# Patient Record
Sex: FEMALE | Race: WHITE | NOT HISPANIC OR LATINO | URBAN - METROPOLITAN AREA
[De-identification: names, ages, dates, MRNs, and addresses within clinical notes are randomized per-mention and may not be internally consistent; named-entity substitution may affect disease eponyms.]

---

## 2022-11-23 ENCOUNTER — INPATIENT (INPATIENT)
Facility: HOSPITAL | Age: 82
LOS: 12 days | Discharge: NURSING, REHAB OR LONG-TERM CARE | DRG: 216 | End: 2022-12-06
Attending: THORACIC SURGERY (CARDIOTHORACIC VASCULAR SURGERY) | Admitting: INTERNAL MEDICINE
Payer: MEDICARE

## 2022-11-23 VITALS — WEIGHT: 144.18 LBS | HEIGHT: 63 IN

## 2022-11-23 DIAGNOSIS — Z90.12 ACQUIRED ABSENCE OF LEFT BREAST AND NIPPLE: Chronic | ICD-10-CM

## 2022-11-23 DIAGNOSIS — I10 ESSENTIAL (PRIMARY) HYPERTENSION: ICD-10-CM

## 2022-11-23 DIAGNOSIS — E78.5 HYPERLIPIDEMIA, UNSPECIFIED: ICD-10-CM

## 2022-11-23 DIAGNOSIS — R07.9 CHEST PAIN, UNSPECIFIED: ICD-10-CM

## 2022-11-23 DIAGNOSIS — I25.10 ATHEROSCLEROTIC HEART DISEASE OF NATIVE CORONARY ARTERY WITHOUT ANGINA PECTORIS: ICD-10-CM

## 2022-11-23 DIAGNOSIS — I34.0 NONRHEUMATIC MITRAL (VALVE) INSUFFICIENCY: ICD-10-CM

## 2022-11-23 LAB
ALBUMIN SERPL ELPH-MCNC: 3.6 G/DL — SIGNIFICANT CHANGE UP (ref 3.3–5.2)
ALP SERPL-CCNC: 84 U/L — SIGNIFICANT CHANGE UP (ref 40–120)
ALT FLD-CCNC: 17 U/L — SIGNIFICANT CHANGE UP
ANION GAP SERPL CALC-SCNC: 13 MMOL/L — SIGNIFICANT CHANGE UP (ref 5–17)
APTT BLD: >200 SEC — CRITICAL HIGH (ref 27.5–35.5)
AST SERPL-CCNC: 84 U/L — HIGH
BASOPHILS # BLD AUTO: 0.03 K/UL — SIGNIFICANT CHANGE UP (ref 0–0.2)
BASOPHILS NFR BLD AUTO: 0.2 % — SIGNIFICANT CHANGE UP (ref 0–2)
BILIRUB SERPL-MCNC: 0.5 MG/DL — SIGNIFICANT CHANGE UP (ref 0.4–2)
BUN SERPL-MCNC: 23.6 MG/DL — HIGH (ref 8–20)
CALCIUM SERPL-MCNC: 9.4 MG/DL — SIGNIFICANT CHANGE UP (ref 8.4–10.5)
CHLORIDE SERPL-SCNC: 100 MMOL/L — SIGNIFICANT CHANGE UP (ref 96–108)
CK SERPL-CCNC: 783 U/L — HIGH (ref 25–170)
CO2 SERPL-SCNC: 24 MMOL/L — SIGNIFICANT CHANGE UP (ref 22–29)
CREAT SERPL-MCNC: 1.05 MG/DL — SIGNIFICANT CHANGE UP (ref 0.5–1.3)
EGFR: 53 ML/MIN/1.73M2 — LOW
EOSINOPHIL # BLD AUTO: 0 K/UL — SIGNIFICANT CHANGE UP (ref 0–0.5)
EOSINOPHIL NFR BLD AUTO: 0 % — SIGNIFICANT CHANGE UP (ref 0–6)
GLUCOSE SERPL-MCNC: 143 MG/DL — HIGH (ref 70–99)
HCT VFR BLD CALC: 37.9 % — SIGNIFICANT CHANGE UP (ref 34.5–45)
HGB BLD-MCNC: 12.7 G/DL — SIGNIFICANT CHANGE UP (ref 11.5–15.5)
IMM GRANULOCYTES NFR BLD AUTO: 0.7 % — SIGNIFICANT CHANGE UP (ref 0–0.9)
INR BLD: 1.15 RATIO — SIGNIFICANT CHANGE UP (ref 0.88–1.16)
LACTATE SERPL-SCNC: 2.4 MMOL/L — HIGH (ref 0.5–2)
LYMPHOCYTES # BLD AUTO: 0.53 K/UL — LOW (ref 1–3.3)
LYMPHOCYTES # BLD AUTO: 3.6 % — LOW (ref 13–44)
MAGNESIUM SERPL-MCNC: 2 MG/DL — SIGNIFICANT CHANGE UP (ref 1.6–2.6)
MCHC RBC-ENTMCNC: 30.3 PG — SIGNIFICANT CHANGE UP (ref 27–34)
MCHC RBC-ENTMCNC: 33.5 GM/DL — SIGNIFICANT CHANGE UP (ref 32–36)
MCV RBC AUTO: 90.5 FL — SIGNIFICANT CHANGE UP (ref 80–100)
MONOCYTES # BLD AUTO: 0.2 K/UL — SIGNIFICANT CHANGE UP (ref 0–0.9)
MONOCYTES NFR BLD AUTO: 1.3 % — LOW (ref 2–14)
NEUTROPHILS # BLD AUTO: 14.02 K/UL — HIGH (ref 1.8–7.4)
NEUTROPHILS NFR BLD AUTO: 94.2 % — HIGH (ref 43–77)
NT-PROBNP SERPL-SCNC: HIGH PG/ML (ref 0–300)
PHOSPHATE SERPL-MCNC: 5.3 MG/DL — HIGH (ref 2.4–4.7)
PLATELET # BLD AUTO: 193 K/UL — SIGNIFICANT CHANGE UP (ref 150–400)
POTASSIUM SERPL-MCNC: 4.4 MMOL/L — SIGNIFICANT CHANGE UP (ref 3.5–5.3)
POTASSIUM SERPL-SCNC: 4.4 MMOL/L — SIGNIFICANT CHANGE UP (ref 3.5–5.3)
PROT SERPL-MCNC: 6.5 G/DL — LOW (ref 6.6–8.7)
PROTHROM AB SERPL-ACNC: 13.4 SEC — SIGNIFICANT CHANGE UP (ref 10.5–13.4)
RBC # BLD: 4.19 M/UL — SIGNIFICANT CHANGE UP (ref 3.8–5.2)
RBC # FLD: 13.5 % — SIGNIFICANT CHANGE UP (ref 10.3–14.5)
SODIUM SERPL-SCNC: 137 MMOL/L — SIGNIFICANT CHANGE UP (ref 135–145)
TROPONIN T SERPL-MCNC: 1.97 NG/ML — HIGH (ref 0–0.06)
WBC # BLD: 14.88 K/UL — HIGH (ref 3.8–10.5)
WBC # FLD AUTO: 14.88 K/UL — HIGH (ref 3.8–10.5)

## 2022-11-23 PROCEDURE — 71045 X-RAY EXAM CHEST 1 VIEW: CPT | Mod: 26,77

## 2022-11-23 RX ORDER — PANTOPRAZOLE SODIUM 20 MG/1
40 TABLET, DELAYED RELEASE ORAL DAILY
Refills: 0 | Status: DISCONTINUED | OUTPATIENT
Start: 2022-11-23 | End: 2022-11-27

## 2022-11-23 RX ORDER — LEVOTHYROXINE SODIUM 125 MCG
112 TABLET ORAL DAILY
Refills: 0 | Status: DISCONTINUED | OUTPATIENT
Start: 2022-11-23 | End: 2022-12-05

## 2022-11-23 RX ORDER — ASPIRIN/CALCIUM CARB/MAGNESIUM 324 MG
81 TABLET ORAL DAILY
Refills: 0 | Status: DISCONTINUED | OUTPATIENT
Start: 2022-11-24 | End: 2022-12-06

## 2022-11-23 RX ORDER — HEPARIN SODIUM 5000 [USP'U]/ML
3800 INJECTION INTRAVENOUS; SUBCUTANEOUS EVERY 6 HOURS
Refills: 0 | Status: DISCONTINUED | OUTPATIENT
Start: 2022-11-23 | End: 2022-11-25

## 2022-11-23 RX ORDER — SERTRALINE 25 MG/1
100 TABLET, FILM COATED ORAL DAILY
Refills: 0 | Status: DISCONTINUED | OUTPATIENT
Start: 2022-11-23 | End: 2022-12-06

## 2022-11-23 RX ORDER — METOPROLOL TARTRATE 50 MG
25 TABLET ORAL EVERY 6 HOURS
Refills: 0 | Status: DISCONTINUED | OUTPATIENT
Start: 2022-11-23 | End: 2022-11-25

## 2022-11-23 RX ORDER — ATORVASTATIN CALCIUM 80 MG/1
80 TABLET, FILM COATED ORAL AT BEDTIME
Refills: 0 | Status: DISCONTINUED | OUTPATIENT
Start: 2022-11-23 | End: 2022-12-06

## 2022-11-23 RX ORDER — HEPARIN SODIUM 5000 [USP'U]/ML
850 INJECTION INTRAVENOUS; SUBCUTANEOUS
Qty: 25000 | Refills: 0 | Status: DISCONTINUED | OUTPATIENT
Start: 2022-11-23 | End: 2022-11-25

## 2022-11-23 RX ADMIN — Medication 25 MILLIGRAM(S): at 23:48

## 2022-11-23 RX ADMIN — HEPARIN SODIUM 0 UNIT(S)/HR: 5000 INJECTION INTRAVENOUS; SUBCUTANEOUS at 23:07

## 2022-11-23 RX ADMIN — Medication 112 MICROGRAM(S): at 23:15

## 2022-11-23 RX ADMIN — SERTRALINE 100 MILLIGRAM(S): 25 TABLET, FILM COATED ORAL at 23:15

## 2022-11-23 RX ADMIN — HEPARIN SODIUM 850 UNIT(S)/HR: 5000 INJECTION INTRAVENOUS; SUBCUTANEOUS at 21:59

## 2022-11-23 RX ADMIN — ATORVASTATIN CALCIUM 80 MILLIGRAM(S): 80 TABLET, FILM COATED ORAL at 23:47

## 2022-11-23 NOTE — CONSULT NOTE ADULT - SUBJECTIVE AND OBJECTIVE BOX
[facility]                                              CARDIOLOGY AT [place] [address]                                             Telephone: [phone]. Fax:[phone]                                                       CARDIOLOGY CONSULTATION NOTE                                                                                             History obtained by: Patient and medical record  Community Cardiologist: DAIANA Feliciano cardiology    obtained: Yes [  ] No [  ]  Reason for Consultation: Chest pain, severe MR and TVD  Available out pt records reviewed: Yes [X] No [  ]    Chief complaint:  Monitor on Tele overnight for acute arrhythmias, check labs including CBC, BMP, trend CE x3,   - FLP and A1C in AM, check ECG and CXR tonight  - continue home Atorvastatin and low cholesterol diet, monitor lFts  - will schedule for TTE to assess functional/structural status  - Exercise stress test in AM  - pain management as needed (NTG/MSO4) post Cath     HPI: Patient is an 83yo F w/ PMHx of CAD, HTN, HLD admitted to [facility] yesterday after experiencing acute onset chest pain.  Found to have NSTEMI and underwent cardiac cath.      ECHO: Pen  STRESS:  CATH:   ELECTROPHYSIOLOGY:     PAST MEDICAL HISTORY  Hypertension  Hyperlipidemia  Hypothyroid  Breast cancer  History of mitral valve prolapse    PAST SURGICAL HISTORY  S/P mastectomy, left    SOCIAL HISTORY:  Denies smoking/alcohol/drugs    FAMILY HISTORY: Family history of systemic lupus erythematosus (Child)  Family history of CVA (Grandparent)    Family History of Cardiovascular Disease:  Yes [  ] No [  ]  Coronary Artery Disease in first degree relative: Yes [  ] No [  ]  Sudden Cardiac Death in First degree relative: Yes [  ] No [  ]    HOME MEDICATIONS:    CURRENT CARDIAC MEDICATIONS:  metoprolol tartrate 25 milliGRAM(s) Oral every 6 hours    CURRENT OTHER MEDICATIONS:  sertraline 100 milliGRAM(s) Oral daily  pantoprazole  Injectable 40 milliGRAM(s) IV Push daily  atorvastatin 80 milliGRAM(s) Oral at bedtime  heparin   Injectable 3800 Unit(s) IV Push every 6 hours PRN For aPTT less than 40  heparin  Infusion. 850 Unit(s)/Hr (8.5 mL/Hr) IV Continuous <Continuous>  levothyroxine 112 MICROGram(s) Oral daily    ALLERGIES: iodinated radiocontrast agents (Vomiting; Headache), No Known Allergies    REVIEW OF SYMPTOMS:   CONSTITUTIONAL: No fever, no chills, no weight loss, no weight gain, no fatigue   ENMT:  No vertigo; No sinus or throat pain  NECK: No pain or stiffness  CARDIOVASCULAR: No chest pain, no dyspnea, no syncope/presyncope, no palpitations, no dizziness, no Orthopnea, no Paroxsymal nocturnal dyspnea  RESPIRATORY: no Shortness of breath, no cough, no wheezing  : No dysuria, no hematuria   GI: No dark color stool, no nausea, no diarrhea, no constipation, no abdominal pain   NEURO: No headache, no slurred speech   MUSCULOSKELETAL: No joint pain or swelling; No muscle, back, or extremity pain  PSYCH: No agitation, no anxiety.    ALL OTHER REVIEW OF SYSTEMS ARE NEGATIVE.    VITAL SIGNS:  T(C): --  T(F): --  HR: --  BP: --  RR: --  SpO2: --    INTAKE AND OUTPUT:     PHYSICAL EXAM:  Constitutional: Comfortable . No acute distress.   HEENT: Atraumatic and normocephalic , neck is supple . no JVD. No carotid bruit.  CNS: A&Ox3. No focal deficits.   Respiratory: CTAB, unlabored   Cardiovascular: RRR normal s1 s2. No murmur. No rubs or gallop.  Gastrointestinal: Soft, non-tender. +Bowel sounds.   Extremities: 2+ Peripheral Pulses, No clubbing, cyanosis, or edema  Psychiatric: Calm . no agitation.   Skin: Warm and dry, no ulcers on extremities     LABS: Pen    INTERPRETATION OF TELEMETRY:   ECG:   Prior ECG: Yes [  ] No [  ]    RADIOLOGY & ADDITIONAL STUDIES:   X-ray:    CT scan:   MRI:   US:                                                [facility]                                              CARDIOLOGY AT [place] [address]                                             Telephone: [phone]. Fax:[phone]                                                       CARDIOLOGY CONSULTATION NOTE                                                                                             History obtained by: Patient and medical record  Community Cardiologist: DAIANA Feliciano cardiology    obtained: Yes [  ] No [  ]  Reason for Consultation: Chest pain, severe MR and TVD  Available out pt records reviewed: Yes [X] No [  ]    Chief complaint:  I had chest pain for a few days     HPI: Patient is an 83yo F w/ PMHx of CAD, LBBB, HTN, HLD admitted to [facility] yesterday after experiencing acute onset chest pain with radiation to back.  Found to have NSTEMI and underwent cardiac cath that revealed triple vessel CAD, placed on Heparin gtt and loaded with Plavix.  TTE also confirmed severe MR.  This evening patient transferred to [facility] ICU for further evaluation and medical management.  Patient with right femoral access site (sheet) and [place] Cath in place conformed by bedside CXR this evening.  Patient denies chest pain, diaphoresis, dyspnea, palpitations, fever, chills or HA.  MICU and CT ICU team by the bedside.               ECHO: Pen  STRESS:  CATH:   ELECTROPHYSIOLOGY:     PAST MEDICAL HISTORY  Hypertension  Hyperlipidemia  Hypothyroid  Breast cancer  History of mitral valve prolapse    PAST SURGICAL HISTORY  S/P mastectomy, left    SOCIAL HISTORY:  Denies smoking/alcohol/drugs    FAMILY HISTORY: Family history of systemic lupus erythematosus (Child)  Family history of CVA (Grandparent)    Family History of Cardiovascular Disease:  Yes [  ] No [  ]  Coronary Artery Disease in first degree relative: Yes [  ] No [  ]  Sudden Cardiac Death in First degree relative: Yes [  ] No [  ]    HOME MEDICATIONS:    CURRENT CARDIAC MEDICATIONS:  metoprolol tartrate 25 milliGRAM(s) Oral every 6 hours    CURRENT OTHER MEDICATIONS:  sertraline 100 milliGRAM(s) Oral daily  pantoprazole  Injectable 40 milliGRAM(s) IV Push daily  atorvastatin 80 milliGRAM(s) Oral at bedtime  heparin   Injectable 3800 Unit(s) IV Push every 6 hours PRN For aPTT less than 40  heparin  Infusion. 850 Unit(s)/Hr (8.5 mL/Hr) IV Continuous <Continuous>  levothyroxine 112 MICROGram(s) Oral daily    ALLERGIES: iodinated radiocontrast agents (Vomiting; Headache), No Known Allergies    REVIEW OF SYMPTOMS:   CONSTITUTIONAL: No fever, no chills, no weight loss, no weight gain, + fatigue   ENMT:  No vertigo; No sinus or throat pain  NECK: No pain or stiffness  CARDIOVASCULAR: No chest pain, no dyspnea, no syncope/presyncope, no palpitations, no dizziness, no Orthopnea, no Paroxsymal nocturnal dyspnea  RESPIRATORY: no Shortness of breath, no cough, no wheezing  : No dysuria, no hematuria   GI: No dark color stool, no nausea, no diarrhea, no constipation, no abdominal pain   NEURO: No headache, no slurred speech   MUSCULOSKELETAL: No joint pain or swelling; No muscle, back, or extremity pain  PSYCH: No agitation, no anxiety   ALL OTHER REVIEW OF SYSTEMS ARE NEGATIVE.    VITAL SIGNS:  T(C): --  T(F): --  HR: --  BP: --  RR: --  SpO2: --    INTAKE AND OUTPUT:     PHYSICAL EXAM:  Constitutional: Comfortable, no acute distress   HEENT: Atraumatic and normocephalic, neck is supple, no JVD  CNS: A&Ox3. moving all extremities, sensory intact and speech fluent   Respiratory: CTAB, unlabored   Cardiovascular: RRR, S1S2, +II/VI holosystolic murmur at apex, no rubs  Gastrointestinal: Soft, non-tender +Bowel sounds   Extremities: 2+ Peripheral Pulses, No clubbing, cyanosis, or edema  Vascular: Right femoral cath in place  Psychiatric: Calm . no agitation   Skin: Warm and dry, no ulcers on extremities     LABS: Pen    INTERPRETATION OF TELEMETRY: NSR no ectopy  ECG: Sinus, LBBB (old)  Prior ECG: Yes [X] No [  ]    RADIOLOGY & ADDITIONAL STUDIES:   X-ray:  Pen

## 2022-11-23 NOTE — CONSULT NOTE ADULT - PROBLEM SELECTOR RECOMMENDATION 4
Low cholesterol diet, daily exercise and optimal weight management reinforced  - continue home Statin therapy, follow LFTs  - check FLP in AM    case d/w Dr. Donald
resume statin

## 2022-11-23 NOTE — CONSULT NOTE ADULT - PROBLEM SELECTOR RECOMMENDATION 3
resume lopressor as tolerated
check for target organ damage (CKD, papilledema, encephalopathy) BP goal<140/90mmHg  - lifestyle modifications (DASH diet, daily exercise encouraged, weight loss, limit ETOH intake, avoid NSAID)   - VS as per unit protocol  - pharmacologic options: ACEi/ARB, Thiazide, BB

## 2022-11-23 NOTE — H&P ADULT - ASSESSMENT
83 yo female, PMHx of LBBB, HTN, HLD, mitral valve prolapse, hypothyroidism, breast CA 10 years ago s/p chemotherapy and left mastectomy, admitted to [facility] 11/22 after experiencing acute onset chest pain with radiation to back. Ruled in for NSTEMI, found to have severe triple vessel disease, acute systolic heart failure, and severe MR. An IABP and SGC were placed and she was transferred to [facility] MICU for further evaluation.    Accepted to [facility] by Interventional Cardiology Dr. Boyer   Admit to MICU   CT surgery consult placed, case discussed at bedside  Aspirin and Heparin gtt for ACS, defer further Plavix for now pending CTsx eval for possible CABG   Initiate GDMT with metoprolol  High intensity statin  EDP 36 on cath and BNP rising from 39211 to now >27K, , will give Lasix   Hemodynamics stable, ADBP at target, end organ function preserved no indication for inotropic support at present time  Monitoring dynamic end points of perfusion, lactate mildly elevated will trend  Check ScvO2. SGC thermodilution malfunctioning, unable to obtain CO/CI, can calculate by Mitra if needed  TTE to eval LVEF and valvulopathy  Check lipid profile and HgbA1c  Continue Synthroid for hypothyroidism, check TSH  PPI for GI ppx   Keep NPO for now      CRITICAL CARE TIME SPENT: 75 minutes assessing presenting problems of acute critical illness, which pose high probability of life threatening deterioration or end organ damage/dysfunction, requiring frequent bedside reassessments and adjustments to plan of care, including medical decision making, initiating plan of care, reviewing data, reviewing radiologic exams, discussing with multidisciplinary team, discussing goals of care. Critical Care time is non-inclusive of independent time spent on procedures performed.

## 2022-11-23 NOTE — H&P ADULT - NSHPSOCIALHISTORY_GEN_ALL_CORE
Never smoker, no alcohol  Resides alone  HCP appoints Iris Diamond Never smoker, no alcohol  Resides alone  HCP appoints daughter Iris Diamond (does not know phone number)

## 2022-11-23 NOTE — H&P ADULT - NSICDXFAMILYHX_GEN_ALL_CORE_FT
FAMILY HISTORY:  Child  Still living? Unknown  Family history of systemic lupus erythematosus, Age at diagnosis: Age Unknown    Grandparent  Still living? Unknown  Family history of CVA, Age at diagnosis: Age Unknown

## 2022-11-23 NOTE — H&P ADULT - NSICDXPASTMEDICALHX_GEN_ALL_CORE_FT
PAST MEDICAL HISTORY:  Breast cancer     History of mitral valve prolapse     Hyperlipidemia     Hypertension     Hypothyroid

## 2022-11-23 NOTE — CONSULT NOTE ADULT - PROBLEM SELECTOR RECOMMENDATION 9
pt undergoing pre op work up  f/u AM labs  p2y12  TTE  carotids   will need vein conduit assessed   f/u MRSA/MSSA  f/u UA  f/u thyrpoid panle, BNP, type and screed   f/u covid screen   f/u coags   f/u pft's    continue heparin gtt, nitro prn for acs, maintain IABP 1:1, appreciate consult.
Admit to MICU for acute arrhythmia monitoring, check CBC, BMP, trend CE x3, ECG and CXR, O2NC 2L  - FLP and A1C in AM, check ECG and CXR tonight  - continue Heparin gtt at current rate, keep PTT 60-90sec, monitor daily CBC and Plt count  - high dose Statin (Lipitor 80mg nighly) Coreg, ACEI, hold Plavix for CTS evaluation severe MR plan  - low cholesterol diet, monitor LFTs  - please schedule for TTE in AM to assess functional/structural status  - tentative Cath on Friday  - pain management as needed

## 2022-11-23 NOTE — H&P ADULT - NS PANP COMMENT GEN_ALL_CORE FT
82F w/ hx of HTN, HLD, mitral valve prolapse, L breast Ca s/p mastectomy and chemo, known LBBB, hiatal hernia, initially presented to [facility] with report of chest discomfort x 1 week with associated radiation into the L arm and nausea. She had similar symptoms in the past attributed to her hernia and felt it was recurrence of this. She was found to have NSTEMI and was given pretreatment given reported contrast allergy prior to cardiac cath which showed tripe vessel disease with EF 30%, suggestion of severe MR and LVEDP 36. Pt had IABP placed and was transferred to [facility] for CT surgery eval/possible high risk PCI. Pt arrived hemodynamically stable on IABP at 1:1 with no complaints of chest pain. Pt denied any RLE numbness/ 82F w/ hx of HTN, HLD, mitral valve prolapse, L breast Ca s/p mastectomy and chemo, known LBBB, hiatal hernia, initially presented to [facility] with report of chest discomfort x 1 week with associated radiation into the L arm and nausea. She had similar symptoms in the past attributed to her hernia and felt it was recurrence of this. She was found to have NSTEMI and was given pretreatment given reported contrast allergy prior to cardiac cath which showed tripe vessel disease with EF 30%, suggestion of severe MR and LVEDP 36. Pt had IABP placed and was transferred to [facility] for CT surgery eval/possible high risk PCI. Pt arrived hemodynamically stable on IABP at 1:1 with no complaints of chest pain or shortness of breath. Pt denied any RLE pain/ paresthesias. Pt was evaluated by CT surgery and preop workup sent by them. Pt also had swan in place but unable to obtain CO/CI given broken prong prevented proper connection to transducer but regardless remained hemodynamically stable through the night.     Will continue with heparin gtt per nomogram   will continue with ASA   will hold plavix while CT surgery evaluating   high dose statin  will uptitrate metoprolol as tolerated   trend troponin/EKG   trend lactate   given lasix 60mg total overnight   wean FiO2 as tolerated   f/u Echo   continue with neurovascular checks of the RLE   will maintain IABP at 1:1 82F w/ hx of HTN, HLD, mitral valve prolapse, L breast Ca s/p mastectomy and chemo, known LBBB, hiatal hernia, initially presented to [facility] with report of chest discomfort x 1 week with associated radiation into the L arm and nausea. She had similar symptoms in the past attributed to her hernia and felt it was recurrence of this. She was found to have NSTEMI and was given pretreatment given reported contrast allergy prior to cardiac cath which showed tripe vessel disease with EF 30%, suggestion of severe MR and LVEDP 36. Pt had IABP placed and was transferred to [facility] for CT surgery eval/possible high risk PCI. Pt arrived hemodynamically stable on IABP at 1:1 with no complaints of chest pain or shortness of breath. Pt denied any RLE pain/ paresthesias. Pt was evaluated by CT surgery and preop workup sent by them. Pt also had swan in place but unable to obtain CO/CI given broken prong prevented proper connection to transducer but regardless remained hemodynamically stable through the night.     Will continue with heparin gtt per nomogram   will continue with ASA   will hold plavix while CT surgery evaluating   high dose statin  will uptitrate metoprolol as tolerated   trend troponin/EKG   trend lactate   given lasix 60mg total overnight   wean FiO2 as tolerated   f/u Echo   continue with neurovascular checks of the RLE   will maintain IABP at 1:1    Plan for CABG

## 2022-11-23 NOTE — H&P ADULT - NEGATIVE CARDIOVASCULAR SYMPTOMS
no chest pain/no palpitations/no orthopnea no chest pain/no palpitations/no dyspnea on exertion/no orthopnea/no peripheral edema

## 2022-11-23 NOTE — CONSULT NOTE ADULT - NS ATTEND AMEND GEN_ALL_CORE FT
Patient seen and examined by me.  I have discussed my recommendation with the PA which are outlined above.  Patient is feeling fine, no chest pain.   Patient still has IABP  Patients echo from today noted.  Patient with MVD and Severe MR considered for CABG early next week.  For AGUS in AM on 11/25/2022.  Will follow.    MEDICATIONS  (STANDING):  aspirin enteric coated 81 milliGRAM(s) Oral daily  atorvastatin 80 milliGRAM(s) Oral at bedtime  heparin  Infusion. 850 Unit(s)/Hr (8.5 mL/Hr) IV Continuous <Continuous>  levothyroxine 112 MICROGram(s) Oral daily  metoprolol tartrate 25 milliGRAM(s) Oral every 6 hours  mupirocin 2% Nasal 1 Application(s) Both Nostrils two times a day  pantoprazole  Injectable 40 milliGRAM(s) IV Push daily  sertraline 100 milliGRAM(s) Oral daily

## 2022-11-23 NOTE — H&P ADULT - HISTORY OF PRESENT ILLNESS
None
83 yo female, PMHx of LBBB, HTN, HLD, mitral valve prolapse, hypothyroidism, breast CA 10 years ago s/p chemotherapy and left mastectomy, admitted to [facility] 11/22 after experiencing acute onset chest pain with radiation to back that onset the night prior. Ruled in for NSTEMI with positive troponin 430 --> 446. She underwent diagnostic cardiac catheterization with Dr. Garibay, which revealed triple vessel CAD, with severe LAD, LCx, and occlusion of RCA. Ventriculogram with EF 30% with concerns for severe MR. She was given ASA, Plavix load 600mg, and placed on Heparin gtt for ACS. An IABP and SGC were placed and she was transferred to [facility] MICU for further evaluation and medical management. Upon arrival to MICU, hemodynamics stable, ADBP >170 on 1:1. Patient was on 100% NRB weaned to 3lpm nasal cannula laying supine comfortably. Denies chest pain, dyspnea, palpitations, orthopnea, LE edema.

## 2022-11-23 NOTE — CONSULT NOTE ADULT - PROBLEM SELECTOR RECOMMENDATION 2
MR severe by latest TTE at  [facility], EF<50%  - risk factors include age>70, HTN, Chol and ESRD, Rheumatic HD  - patient w/o angina, syncope, or fulminant heart failure, no JVD or edema on exam  - holosystolic murmur on exam, ECG, CXR no cardiomegaly seen  - known CAD, likely will require AGUS for proper valve morphology, pressure gradient, etc.,  - avoid venodilators (Nitrates) and minimize use of negative inotrope effects  - awaiting CTS consult
TTE pending

## 2022-11-23 NOTE — CONSULT NOTE ADULT - PROBLEM SELECTOR PROBLEM 2
Severe mitral regurgitation by prior echocardiogram
Severe mitral regurgitation by prior echocardiogram

## 2022-11-23 NOTE — CONSULT NOTE ADULT - SUBJECTIVE AND OBJECTIVE BOX
Surgeon: Dr. Ryan    Consult requesting by: Dr. Boyer    HISTORY OF PRESENT ILLNESS:  82y Female    PAST MEDICAL & SURGICAL HISTORY:  Hypertension      Hyperlipidemia      Hypothyroid      Breast cancer      History of mitral valve prolapse      S/P mastectomy, left          MEDICATIONS  (STANDING):  atorvastatin 80 milliGRAM(s) Oral at bedtime  heparin  Infusion. 850 Unit(s)/Hr (8.5 mL/Hr) IV Continuous <Continuous>  levothyroxine 112 MICROGram(s) Oral daily  metoprolol tartrate 25 milliGRAM(s) Oral every 6 hours  pantoprazole  Injectable 40 milliGRAM(s) IV Push daily  sertraline 100 milliGRAM(s) Oral daily    MEDICATIONS  (PRN):  heparin   Injectable 3800 Unit(s) IV Push every 6 hours PRN For aPTT less than 40    Antiplatelet therapy:                           Last dose/amt:    Allergies    No Known Allergies    Intolerances    iodinated radiocontrast agents (Vomiting; Headache)      SOCIAL HISTORY:  Smoker: [ ] Yes  [ ] No        PACK YEARS:                         WHEN QUIT?  ETOH use: [ ] Yes  [ ] No              FREQUENCY / QUANTITY:  Ilicit Drug use:  [ ] Yes  [ ] No  Occupation:  Live with:  Assisted device use:    FAMILY HISTORY:  Family history of systemic lupus erythematosus (Child)    Family history of CVA (Grandparent)        Review of Systems  CONSTITUTIONAL:  Fevers[ ] chills[ ] sweats[ ] fatigue[ ] weight loss[ ] weight gain [ ]                                     NEGATIVE [ ]   NEURO:  parathesias[ ] seizures [ ]  syncope [ ]  confusion [ ]                                                                                NEGATIVE[ ]   EYES: glasses[ ]  blurry vision[ ]  discharge[ ] pain[ ] glaucoma [ ]                                                                          NEGATIVE[ ]   ENMT:  difficulty hearing [ ]  vertigo[ ]  dysphagia[ ] epistaxis[ ] recent dental work [ ]                                    NEGATIVE[ ]   CV:  chest pain[ ] palpitations[ ] LIM [ ] diaphoresis [ ]                                                                                           NEGATIVE[ ]   RESPIRATORY:  wheezing[ ] SOB[ ] cough [ ] sputum[ ] hemoptysis[ ]                                                                  NEGATIVE[ ]   GI:  nausea[ ]  vommiting [ ]  diarrhea[ ] constipation [ ] melena [ ]                                                                      NEGATIVE[ ]   : hematuria[ ]  dysuria[ ] urgency[ ] incontinence[ ]                                                                                            NEGATIVE[ ]   MUSKULOSKELETAL:  arthritis[ ]  joint swelling [ ] muscle weakness [ ]                                                                NEGATIVE[ ]   SKIN/BREAST:  rash[ ] itching [ ]  hair loss[ ] masses[ ]                                                                                              NEGATIVE[ ]   PSYCH:  dementia [ ] depresion [ ] anxiety[ ]                                                                                                               NEGATIVE[ ]   HEME/LYMPH:  bruises easily[ ] enlarged lymph nodes[ ] tender lymph nodes[ ]                                               NEGATIVE[ ]   ENDOCRINE:  cold intolerance[ ] heat intolerance[ ] polydipsia[ ]                                                                          NEGATIVE[ ]     PHYSICAL EXAM  Vital Signs Last 24 Hrs  T(C): 36.7 (23 Nov 2022 23:00), Max: 36.7 (23 Nov 2022 23:00)  T(F): 98.1 (23 Nov 2022 23:00), Max: 98.1 (23 Nov 2022 23:00)  HR: --  BP: --  BP(mean): --  RR: --  SpO2: --        CONSTITUTIONAL:                                                                          WNL[ ]   Neuro: WNL[ ] Normal exam oriented to person/place & time with no focal motor or sensory  deficits. Other __________                    Eyes: WNL[ ]   Normal exam of conjunctiva & lids, pupils equally reactive. Other______________________________     ENT: WNL[ ]    Normal exam of nasal/oral mucosa with absence of cyanosis. Other_____________________________  Neck: WNL[ ]  Normal exam of jugular veins, trachea & thyroid. Other_________________________________________  Chest: WNL[ ] Normal lung exam with good air movement absence of wheezes, rales, or rhonchi: Other_________________________________________                                                                                CV:  Auscultation: normal [ ] S3[ ] S4[ ] Irregular [ ] Rub[ ] Clicks[ ]    Murmurs none:[ ]systolic [ ]  diastolic [ ] holosystolic [ ]  Carotids: No Bruits[ ] Other____________ Abdominal Aorta: normal [ ] nonpalpable[ ]Other___________                                                                                      GI: WNL[ ] Normal exam of abdomen, liver & spleen with no noted masses or tenderness. Other______________________                                                                                                        Extremities: WNL[ ] Normal no evidence of cyanosis or deformity Edema: none[ ]trace[ ]1+[ ]2+[ ]3+[ ]4+[ ]  Lower Extremity Pulses: Right[ ] Left[ ]Varicosities[ ]  SKIN :WNL[ ] Normal exam to inspection & palation. Other:____________________                                                          LABS:                        12.7   14.88 )-----------( 193      ( 23 Nov 2022 22:15 )             37.9     11-23    137  |  100  |  23.6<H>  ----------------------------<  143<H>  4.4   |  24.0  |  1.05    Ca    9.4      23 Nov 2022 22:15  Phos  5.3     11-23  Mg     2.0     11-23    TPro  6.5<L>  /  Alb  3.6  /  TBili  0.5  /  DBili  x   /  AST  84<H>  /  ALT  17  /  AlkPhos  84  11-23    PT/INR - ( 23 Nov 2022 22:15 )   PT: 13.4 sec;   INR: 1.15 ratio         PTT - ( 23 Nov 2022 22:15 )  PTT:>200.0 sec    CARDIAC MARKERS ( 23 Nov 2022 22:15 )  x     / 1.97 ng/mL / 783 U/L / x     / 143.8 ng/mL        Serum Pro-Brain Natriuretic Peptide: 13954 pg/mL (11-23 @ 22:15)        Cardiac Cath:    TTE / AGUS:      Assessment:          Plan:                         Surgeon: Dr. Ryan    Consult requesting by: Dr. Boyer    HISTORY OF PRESENT ILLNESS:  81yo F w/ PMHx of CAD, LBBB, HTN, HLD admitted to [facility] yesterday after experiencing acute onset chest pain with radiation to back.  Found to have NSTEMI and underwent cardiac cath that revealed triple vessel CAD, placed on Heparin gtt and loaded with Plavix.  TTE also confirmed severe MR.  This evening patient transferred to [facility] ICU for further evaluation and medical management.  Patient with right femoral access site (sheet) and [place] Cath in place conformed by bedside CXR this evening.  Patient denies chest pain, diaphoresis, dyspnea, palpitations, fever, chills or HA.  MICU and CT ICU team by the bedside.         Pt pending TTE to further evaluate severe MR. Pt hemodynamically stable at this time. CI via SGC pending. Pt remains on heparin gtt with IABP for ACS, elevated troponin, ck, BNP.  Pt to be further evaluated for possible cardiac surgery. Dr. Cruzles aware.     PAST MEDICAL & SURGICAL HISTORY:  Hypertension      Hyperlipidemia      Hypothyroid      Breast cancer      History of mitral valve prolapse      S/P mastectomy, left          MEDICATIONS  (STANDING):  atorvastatin 80 milliGRAM(s) Oral at bedtime  heparin  Infusion. 850 Unit(s)/Hr (8.5 mL/Hr) IV Continuous <Continuous>  levothyroxine 112 MICROGram(s) Oral daily  metoprolol tartrate 25 milliGRAM(s) Oral every 6 hours  pantoprazole  Injectable 40 milliGRAM(s) IV Push daily  sertraline 100 milliGRAM(s) Oral daily    MEDICATIONS  (PRN):  heparin   Injectable 3800 Unit(s) IV Push every 6 hours PRN For aPTT less than 40    Antiplatelet therapy:     asa, plavix                       Last dose/amt: plavix loaded 11/23    Allergies    No Known Allergies    Intolerances    iodinated radiocontrast agents (Vomiting; Headache)      SOCIAL HISTORY:  Smoker: [ ] Yes  [x ] No        PACK YEARS:                         WHEN QUIT?  ETOH use: [ ] Yes  [x ] No              FREQUENCY / QUANTITY:  Ilicit Drug use:  [ ] Yes  [x ] No  Occupation: retired with full ADL's, daughter lived 5 minutes from her home   Live with: self  Assisted device use: none    FAMILY HISTORY:  Family history of systemic lupus erythematosus (Child)    Family history of CVA (Grandparent)        Review of Systems  CONSTITUTIONAL:  Fevers[ ] chills[ ] sweats[ ] fatigue[ ] weight loss[ ] weight gain [ ]                                     NEGATIVE [ x]   NEURO:  parathesias[ ] seizures [ ]  syncope [ ]  confusion [ ]                                                                                NEGATIVE[x ]   EYES: glasses[ ]  blurry vision[ ]  discharge[ ] pain[ ] glaucoma [ ]                                                                          NEGATIVE[x ]   ENMT:  difficulty hearing [ ]  vertigo[ ]  dysphagia[ ] epistaxis[ ] recent dental work [ ]                                    NEGATIVE[x ]   CV:  chest pain[ ] palpitations[ ] LIM [ ] diaphoresis [ ]                                                                                           NEGATIVE[ x]   RESPIRATORY:  wheezing[ ] SOB[ ] cough [ ] sputum[ ] hemoptysis[ ]                                                                  NEGATIVE[x ]   GI:  nausea[ ]  vommiting [ ]  diarrhea[ ] constipation [ ] melena [ ]                                                                      NEGATIVE[x ]   : hematuria[ ]  dysuria[ ] urgency[ ] incontinence[ ]                                                                                            NEGATIVE[x ]   MUSKULOSKELETAL:  arthritis[ ]  joint swelling [ ] muscle weakness [ ]                                                                NEGATIVE[x ]   SKIN/BREAST:  rash[ ] itching [ ]  hair loss[ ] masses[ ]                                                                                              NEGATIVE[ x]   PSYCH:  dementia [ ] depresion [ ] anxiety[ ]                                                                                                               NEGATIVE[x ]   HEME/LYMPH:  bruises easily[ ] enlarged lymph nodes[ ] tender lymph nodes[ ]                                               NEGATIVE[ x]   ENDOCRINE:  cold intolerance[ ] heat intolerance[ ] polydipsia[ ]                                                                          NEGATIVE[ x]     PHYSICAL EXAM  Vital Signs Last 24 Hrs  T(C): 36.7 (23 Nov 2022 23:00), Max: 36.7 (23 Nov 2022 23:00)  T(F): 98.1 (23 Nov 2022 23:00), Max: 98.1 (23 Nov 2022 23:00)  HR: --  BP: --  BP(mean): --  RR: --  SpO2: --        CONSTITUTIONAL:                                                                          WNL[ ]   General: NAD  Neuro: A+O x 3, non-focal, speech clear and intact  HEENT: PERRL, EOMI, oral mucosa pink and moist  Neck: supple, no JVD  CV: regular rate, regular rhythm, +S1S2, no murmurs or rub  chest: Left mastectomy surgical scar   Pulm/chest: lung sounds CTA and equal bilaterally, no accessory muscle use noted  Abd: soft, NT, ND, +BS  : normal and skin intact   Ext: MOSHER x 4, no C/C/E  Skin: warm, well perfused       LABS:                        12.7   14.88 )-----------( 193      ( 23 Nov 2022 22:15 )             37.9     11-23    137  |  100  |  23.6<H>  ----------------------------<  143<H>  4.4   |  24.0  |  1.05    Ca    9.4      23 Nov 2022 22:15  Phos  5.3     11-23  Mg     2.0     11-23    TPro  6.5<L>  /  Alb  3.6  /  TBili  0.5  /  DBili  x   /  AST  84<H>  /  ALT  17  /  AlkPhos  84  11-23    PT/INR - ( 23 Nov 2022 22:15 )   PT: 13.4 sec;   INR: 1.15 ratio         PTT - ( 23 Nov 2022 22:15 )  PTT:>200.0 sec    CARDIAC MARKERS ( 23 Nov 2022 22:15 )  x     / 1.97 ng/mL / 783 U/L / x     / 143.8 ng/mL        Serum Pro-Brain Natriuretic Peptide: 53243 pg/mL (11-23 @ 22:15)        Cardiac Cath:    TTE / AGUS:      Assessment:          Plan:

## 2022-11-24 LAB
A1C WITH ESTIMATED AVERAGE GLUCOSE RESULT: 5.4 % — SIGNIFICANT CHANGE UP (ref 4–5.6)
ALBUMIN SERPL ELPH-MCNC: 3.1 G/DL — LOW (ref 3.3–5.2)
ALP SERPL-CCNC: 78 U/L — SIGNIFICANT CHANGE UP (ref 40–120)
ALT FLD-CCNC: 20 U/L — SIGNIFICANT CHANGE UP
ANION GAP SERPL CALC-SCNC: 11 MMOL/L — SIGNIFICANT CHANGE UP (ref 5–17)
APPEARANCE UR: CLEAR — SIGNIFICANT CHANGE UP
APTT BLD: 104.9 SEC — HIGH (ref 27.5–35.5)
APTT BLD: 29.7 SEC — SIGNIFICANT CHANGE UP (ref 27.5–35.5)
APTT BLD: 96.2 SEC — HIGH (ref 27.5–35.5)
AST SERPL-CCNC: 99 U/L — HIGH
BASOPHILS # BLD AUTO: 0 K/UL — SIGNIFICANT CHANGE UP (ref 0–0.2)
BASOPHILS NFR BLD AUTO: 0 % — SIGNIFICANT CHANGE UP (ref 0–2)
BILIRUB SERPL-MCNC: 0.5 MG/DL — SIGNIFICANT CHANGE UP (ref 0.4–2)
BILIRUB UR-MCNC: NEGATIVE — SIGNIFICANT CHANGE UP
BUN SERPL-MCNC: 29.5 MG/DL — HIGH (ref 8–20)
CALCIUM SERPL-MCNC: 8.8 MG/DL — SIGNIFICANT CHANGE UP (ref 8.4–10.5)
CHLORIDE SERPL-SCNC: 101 MMOL/L — SIGNIFICANT CHANGE UP (ref 96–108)
CHOLEST SERPL-MCNC: 195 MG/DL — SIGNIFICANT CHANGE UP
CK SERPL-CCNC: 803 U/L — HIGH (ref 25–170)
CO2 SERPL-SCNC: 24 MMOL/L — SIGNIFICANT CHANGE UP (ref 22–29)
COLOR SPEC: YELLOW — SIGNIFICANT CHANGE UP
CREAT SERPL-MCNC: 1.18 MG/DL — SIGNIFICANT CHANGE UP (ref 0.5–1.3)
DIFF PNL FLD: ABNORMAL
EGFR: 46 ML/MIN/1.73M2 — LOW
EOSINOPHIL # BLD AUTO: 0 K/UL — SIGNIFICANT CHANGE UP (ref 0–0.5)
EOSINOPHIL NFR BLD AUTO: 0 % — SIGNIFICANT CHANGE UP (ref 0–6)
EPI CELLS # UR: SIGNIFICANT CHANGE UP
ESTIMATED AVERAGE GLUCOSE: 108 MG/DL — SIGNIFICANT CHANGE UP (ref 68–114)
GAS PNL BLDV: SIGNIFICANT CHANGE UP
GLUCOSE SERPL-MCNC: 150 MG/DL — HIGH (ref 70–99)
GLUCOSE UR QL: NEGATIVE MG/DL — SIGNIFICANT CHANGE UP
HCT VFR BLD CALC: 34.5 % — SIGNIFICANT CHANGE UP (ref 34.5–45)
HDLC SERPL-MCNC: 65 MG/DL — SIGNIFICANT CHANGE UP
HGB BLD-MCNC: 11.5 G/DL — SIGNIFICANT CHANGE UP (ref 11.5–15.5)
KETONES UR-MCNC: ABNORMAL
LEUKOCYTE ESTERASE UR-ACNC: ABNORMAL
LIPID PNL WITH DIRECT LDL SERPL: 109 MG/DL — HIGH
LYMPHOCYTES # BLD AUTO: 0.31 K/UL — LOW (ref 1–3.3)
LYMPHOCYTES # BLD AUTO: 2.6 % — LOW (ref 13–44)
MAGNESIUM SERPL-MCNC: 2 MG/DL — SIGNIFICANT CHANGE UP (ref 1.6–2.6)
MCHC RBC-ENTMCNC: 29.9 PG — SIGNIFICANT CHANGE UP (ref 27–34)
MCHC RBC-ENTMCNC: 33.3 GM/DL — SIGNIFICANT CHANGE UP (ref 32–36)
MCV RBC AUTO: 89.6 FL — SIGNIFICANT CHANGE UP (ref 80–100)
MONOCYTES # BLD AUTO: 0.52 K/UL — SIGNIFICANT CHANGE UP (ref 0–0.9)
MONOCYTES NFR BLD AUTO: 4.4 % — SIGNIFICANT CHANGE UP (ref 2–14)
MRSA PCR RESULT.: SIGNIFICANT CHANGE UP
NEUTROPHILS # BLD AUTO: 10.96 K/UL — HIGH (ref 1.8–7.4)
NEUTROPHILS NFR BLD AUTO: 93 % — HIGH (ref 43–77)
NITRITE UR-MCNC: NEGATIVE — SIGNIFICANT CHANGE UP
NON HDL CHOLESTEROL: 130 MG/DL — HIGH
NT-PROBNP SERPL-SCNC: HIGH PG/ML (ref 0–300)
PA ADP PRP-ACNC: 243 PRU — SIGNIFICANT CHANGE UP (ref 180–376)
PH UR: 5 — SIGNIFICANT CHANGE UP (ref 5–8)
PHOSPHATE SERPL-MCNC: 5.2 MG/DL — HIGH (ref 2.4–4.7)
PLATELET # BLD AUTO: SIGNIFICANT CHANGE UP K/UL (ref 150–400)
POTASSIUM SERPL-MCNC: 4.3 MMOL/L — SIGNIFICANT CHANGE UP (ref 3.5–5.3)
POTASSIUM SERPL-SCNC: 4.3 MMOL/L — SIGNIFICANT CHANGE UP (ref 3.5–5.3)
PROT SERPL-MCNC: 6.1 G/DL — LOW (ref 6.6–8.7)
PROT UR-MCNC: 15
RBC # BLD: 3.85 M/UL — SIGNIFICANT CHANGE UP (ref 3.8–5.2)
RBC # FLD: 13.3 % — SIGNIFICANT CHANGE UP (ref 10.3–14.5)
RBC CASTS # UR COMP ASSIST: SIGNIFICANT CHANGE UP /HPF (ref 0–4)
S AUREUS DNA NOSE QL NAA+PROBE: DETECTED
SODIUM SERPL-SCNC: 136 MMOL/L — SIGNIFICANT CHANGE UP (ref 135–145)
SP GR SPEC: 1.02 — SIGNIFICANT CHANGE UP (ref 1.01–1.02)
TRIGL SERPL-MCNC: 106 MG/DL — SIGNIFICANT CHANGE UP
TROPONIN T SERPL-MCNC: 1.97 NG/ML — HIGH (ref 0–0.06)
TSH SERPL-MCNC: 1.32 UIU/ML — SIGNIFICANT CHANGE UP (ref 0.27–4.2)
UROBILINOGEN FLD QL: NEGATIVE MG/DL — SIGNIFICANT CHANGE UP
WBC # BLD: 11.79 K/UL — HIGH (ref 3.8–10.5)
WBC # FLD AUTO: 11.79 K/UL — HIGH (ref 3.8–10.5)
WBC UR QL: SIGNIFICANT CHANGE UP /HPF (ref 0–5)

## 2022-11-24 PROCEDURE — 99233 SBSQ HOSP IP/OBS HIGH 50: CPT

## 2022-11-24 PROCEDURE — 99223 1ST HOSP IP/OBS HIGH 75: CPT

## 2022-11-24 PROCEDURE — 99223 1ST HOSP IP/OBS HIGH 75: CPT | Mod: 25

## 2022-11-24 PROCEDURE — 93880 EXTRACRANIAL BILAT STUDY: CPT | Mod: 26

## 2022-11-24 PROCEDURE — 93010 ELECTROCARDIOGRAM REPORT: CPT

## 2022-11-24 PROCEDURE — 99291 CRITICAL CARE FIRST HOUR: CPT

## 2022-11-24 RX ORDER — FUROSEMIDE 40 MG
40 TABLET ORAL ONCE
Refills: 0 | Status: COMPLETED | OUTPATIENT
Start: 2022-11-24 | End: 2022-11-24

## 2022-11-24 RX ORDER — MUPIROCIN 20 MG/G
1 OINTMENT TOPICAL
Refills: 0 | Status: COMPLETED | OUTPATIENT
Start: 2022-11-24 | End: 2022-12-01

## 2022-11-24 RX ORDER — FUROSEMIDE 40 MG
20 TABLET ORAL ONCE
Refills: 0 | Status: COMPLETED | OUTPATIENT
Start: 2022-11-24 | End: 2022-11-24

## 2022-11-24 RX ADMIN — HEPARIN SODIUM 650 UNIT(S)/HR: 5000 INJECTION INTRAVENOUS; SUBCUTANEOUS at 00:06

## 2022-11-24 RX ADMIN — HEPARIN SODIUM 3800 UNIT(S): 5000 INJECTION INTRAVENOUS; SUBCUTANEOUS at 14:12

## 2022-11-24 RX ADMIN — ATORVASTATIN CALCIUM 80 MILLIGRAM(S): 80 TABLET, FILM COATED ORAL at 22:31

## 2022-11-24 RX ADMIN — Medication 25 MILLIGRAM(S): at 17:25

## 2022-11-24 RX ADMIN — Medication 40 MILLIGRAM(S): at 02:41

## 2022-11-24 RX ADMIN — SERTRALINE 100 MILLIGRAM(S): 25 TABLET, FILM COATED ORAL at 13:00

## 2022-11-24 RX ADMIN — MUPIROCIN 1 APPLICATION(S): 20 OINTMENT TOPICAL at 22:34

## 2022-11-24 RX ADMIN — PANTOPRAZOLE SODIUM 40 MILLIGRAM(S): 20 TABLET, DELAYED RELEASE ORAL at 12:59

## 2022-11-24 RX ADMIN — HEPARIN SODIUM 650 UNIT(S)/HR: 5000 INJECTION INTRAVENOUS; SUBCUTANEOUS at 14:03

## 2022-11-24 RX ADMIN — Medication 20 MILLIGRAM(S): at 01:24

## 2022-11-24 RX ADMIN — Medication 25 MILLIGRAM(S): at 06:23

## 2022-11-24 RX ADMIN — HEPARIN SODIUM 450 UNIT(S)/HR: 5000 INJECTION INTRAVENOUS; SUBCUTANEOUS at 06:51

## 2022-11-24 RX ADMIN — HEPARIN SODIUM 0 UNIT(S)/HR: 5000 INJECTION INTRAVENOUS; SUBCUTANEOUS at 05:49

## 2022-11-24 RX ADMIN — Medication 25 MILLIGRAM(S): at 12:59

## 2022-11-24 RX ADMIN — Medication 81 MILLIGRAM(S): at 12:59

## 2022-11-24 NOTE — PROGRESS NOTE ADULT - SUBJECTIVE AND OBJECTIVE BOX
Patient seen and examined.  Denies CP, SOB, N/V.  Feeling " so much better"  She is chest pain free and improving.      T(C): 36.4 (11-24-22 @ 08:00)  T(F): 97.5 (11-24-22 @ 08:00)  HR: 60 (11-24-22 @ 18:00)  BP: 150/87 (11-24-22 @ 18:00)  BP(mean): 105 (11-24-22 @ 18:00)    RR: 17 (11-24-22 @ 18:00)  SpO2: 98% (11-24-22 @ 18:00)    CVP(mm Hg): 4 (11-24-22 @ 18:00)    PA: 37/10 (11-24-22 @ 18:00)  PA(mean): 19 (11-24-22 @ 18:00)      Physical Exam:  Gen: A&Ox3  Pulm:  CTA b/l, no r/r/w  CV:  S1S2, + murmur  Abd: +BS, soft, NT, ND  Ext:  +DP b/l, no c/c/e  IABP in place     I&O's Detail    23 Nov 2022 07:01  -  24 Nov 2022 07:00  --------------------------------------------------------  IN:    Heparin Infusion: 52 mL  Total IN: 52 mL    OUT:    Indwelling Catheter - Urethral (mL): 455 mL  Total OUT: 455 mL    Total NET: -403 mL      24 Nov 2022 07:01  -  24 Nov 2022 18:12  --------------------------------------------------------  IN:    Heparin Infusion: 61.5 mL  Total IN: 61.5 mL    OUT:    Indwelling Catheter - Urethral (mL): 315 mL  Total OUT: 315 mL    Total NET: -253.5 mL                              11.5   11.79 )-----------( Clumped    ( 24 Nov 2022 05:00 )             34.5   11-24    136  |  101  |  29.5<H>  ----------------------------<  150<H>  4.3   |  24.0  |  1.18    Ca    8.8      24 Nov 2022 05:00  Phos  5.2     11-24  Mg     2.0     11-24    TPro  6.1<L>  /  Alb  3.1<L>  /  TBili  0.5  /  DBili  x   /  AST  99<H>  /  ALT  20  /  AlkPhos  78  11-24  aPTT: 29.7 sec; PT: x    ; INR: x      11-24-22 @ 12:53         CAPILLARY BLOOD GLUCOSE            Medications:  aspirin enteric coated 81 milliGRAM(s) Oral daily  atorvastatin 80 milliGRAM(s) Oral at bedtime  heparin   Injectable 3800 Unit(s) IV Push every 6 hours PRN  heparin  Infusion. 850 Unit(s)/Hr IV Continuous <Continuous>  levothyroxine 112 MICROGram(s) Oral daily  metoprolol tartrate 25 milliGRAM(s) Oral every 6 hours  mupirocin 2% Nasal 1 Application(s) Both Nostrils two times a day  pantoprazole  Injectable 40 milliGRAM(s) IV Push daily  sertraline 100 milliGRAM(s) Oral daily      < from: TTE Echo Complete w/ Contrast w/ Doppler (11.24.22 @ 12:02) >  Summary:   1. Technically difficult study.   2. Left ventricular ejection fraction, by visual estimation, is 45 to   50%.   3. Multiple left ventricular regional wall motion abnormalities exist.   See wall motion findings.   4. Severe mitral valve regurgitation.   5. Moderate to severe mitral annular calcification.   6. Thickening of the anterior mitral valve leaflet.   7. Normal right ventricular size and function.   8. Normal left atrial size.   9. Normal right atrial size.  10. Mild tricuspid regurgitation.  11. Sclerotic aortic valve with normal opening.    < end of copied text >

## 2022-11-24 NOTE — CHART NOTE - NSCHARTNOTEFT_GEN_A_CORE
CCL NP Update:    Transferred from [place] on 11/23/22    81 yo female, PMHx of LBBB, HTN, HLD, mitral valve prolapse, hypothyroidism, breast CA 10 years ago s/p chemotherapy and left mastectomy, admitted to [facility] 11/22 after experiencing acute onset chest pain with radiation to back that onset the night prior. Ruled in for NSTEMI with positive troponin 430 --> 446. She underwent diagnostic cardiac catheterization with Dr. Garibay, which revealed triple vessel CAD, with severe LAD, LCx, and occlusion of RCA. Ventriculogram with EF 30% with concerns for severe MR. She was given ASA, Plavix load 600mg, and placed on Heparin gtt for ACS. An IABP and SGC were placed and she was transferred to [facility] MICU for further evaluation and medical management. Upon arrival to MICU, hemodynamics stable, ADBP >170 on 1:1. Patient was on 100% NRB weaned to 3lpm nasal cannula laying supine comfortably. Denies chest pain, dyspnea, palpitations, orthopnea, LE edema.       No acute events overnight  NAD and HDS  IABP 1:1 support, access site stable  heparin ACS protocol in progress  BNP 30,249   Tnl 1.9  CBC/chem stable  TTE pending  CTS following CCL NP Update:    Transferred from [place] on 11/23/22    83 yo female, PMHx of LBBB, HTN, HLD, mitral valve prolapse, hypothyroidism, breast CA 10 years ago s/p chemotherapy and left mastectomy, admitted to [facility] 11/22 after experiencing acute onset chest pain with radiation to back that onset the night prior. Ruled in for NSTEMI with positive troponin 430 --> 446. She underwent diagnostic cardiac catheterization with Dr. Garibay, which revealed triple vessel CAD, with severe LAD, LCx, and occlusion of RCA. Ventriculogram with EF 30% with concerns for severe MR. She was given ASA, Plavix load 600mg, and placed on Heparin gtt for ACS. An IABP and SGC were placed and she was transferred to [facility] MICU for further evaluation and medical management. Upon arrival to MICU, hemodynamics stable, ADBP >170 on 1:1. Patient was on 100% NRB weaned to 3lpm nasal cannula laying supine comfortably. Denies chest pain, dyspnea, palpitations, orthopnea, LE edema.       No acute events overnight  NAD and HDS, denies CP or SOB  IABP 1:1 support, access site stable  SGC in place, PA mean 16mmHg  heparin ACS protocol in progress  BNP 30,249   Tnl 1.9  CBC/chem stable  TTE pending  CTS following CCL NP Update:    Transferred from [place] on 11/23/22    81 yo female, PMHx of LBBB, HTN, HLD, mitral valve prolapse, hypothyroidism, breast CA 10 years ago s/p chemotherapy and left mastectomy, admitted to [facility] 11/22 after experiencing acute onset chest pain with radiation to back that onset the night prior. Ruled in for NSTEMI with positive troponin 430 --> 446. She underwent diagnostic cardiac catheterization with Dr. Garibay, which revealed triple vessel CAD, with severe LAD, LCx, and occlusion of RCA. Ventriculogram with EF 30% with concerns for severe MR. She was given ASA, Plavix load 600mg, and placed on Heparin gtt for ACS. An IABP and SGC were placed and she was transferred to [facility] MICU for further evaluation and medical management. Upon arrival to MICU, hemodynamics stable, ADBP >170 on 1:1. Patient was on 100% NRB weaned to 3lpm nasal cannula laying supine comfortably. Denies chest pain, dyspnea, palpitations, orthopnea, LE edema.       No acute events overnight  NAD and HDS, denies CP or SOB  IABP 1:1 support, access site stable  SGC in place, PA mean 16mmHg  heparin ACS protocol in progress  BNP 30,249   Tnl 1.9  CBC/chem stable  TTE pending  CTS following  Cardiac rehab info provided/referral and communication to cardiac rehab completed

## 2022-11-24 NOTE — PROGRESS NOTE ADULT - SUBJECTIVE AND OBJECTIVE BOX
Patient is a 82y old  Female who presents with a chief complaint of NSTEMI, CAD with chest pain (2022 23:28)      BRIEF HOSPITAL COURSE:   83 yo female, PMHx of LBBB, HTN, HLD, mitral valve prolapse, hypothyroidism, breast CA 10 years ago s/p chemotherapy and left mastectomy, admitted to [facility]  after experiencing acute onset chest pain with radiation to back that onset the night prior. Ruled in for NSTEMI with positive troponin 430 --> 446. She underwent diagnostic cardiac catheterization with Dr. Garibay, which revealed triple vessel CAD, with severe LAD, LCx, and occlusion of RCA. Ventriculogram with EF 30% with concerns for severe MR. She was given ASA, Plavix load 600mg, and placed on Heparin gtt for ACS. An IABP and SGC were placed and she was transferred to [facility] MICU for further evaluation and medical management. Upon arrival to MICU, hemodynamics stable, ADBP >170 on 1:1. Patient was on 100% NRB weaned to 3lpm nasal cannula laying supine comfortably        PAST MEDICAL & SURGICAL HISTORY:  Hypertension      Hyperlipidemia      Hypothyroid      Breast cancer      History of mitral valve prolapse      S/P mastectomy, left            Medications:    metoprolol tartrate 25 milliGRAM(s) Oral every 6 hours      sertraline 100 milliGRAM(s) Oral daily      aspirin enteric coated 81 milliGRAM(s) Oral daily  heparin   Injectable 3800 Unit(s) IV Push every 6 hours PRN  heparin  Infusion. 850 Unit(s)/Hr IV Continuous <Continuous>    pantoprazole  Injectable 40 milliGRAM(s) IV Push daily      atorvastatin 80 milliGRAM(s) Oral at bedtime  levothyroxine 112 MICROGram(s) Oral daily                  ICU Vital Signs Last 24 Hrs  T(C): 36.4 (2022 08:00), Max: 36.7 (2022 23:00)  T(F): 97.5 (2022 08:00), Max: 98.1 (2022 23:00)  HR: 57 (2022 12:00) (53 - 89)  BP: 154/57 (2022 12:00) (134/55 - 170/75)  BP(mean): 86 (2022 12:00) (73 - 151)  ABP: --  ABP(mean): --  RR: 12 (2022 12:00) (10 - 26)  SpO2: 100% (2022 12:00) (90% - 100%)    O2 Parameters below as of 2022 12:00  Patient On (Oxygen Delivery Method): nasal cannula  O2 Flow (L/min): 2              I&O's Detail    2022 07:01  -  2022 07:00  --------------------------------------------------------  IN:    Heparin Infusion: 52 mL  Total IN: 52 mL    OUT:    Indwelling Catheter - Urethral (mL): 455 mL  Total OUT: 455 mL    Total NET: -403 mL      2022 07:01  -  2022 12:30  --------------------------------------------------------  IN:    Heparin Infusion: 18 mL  Total IN: 18 mL    OUT:    Indwelling Catheter - Urethral (mL): 140 mL  Total OUT: 140 mL    Total NET: -122 mL            LABS:                        11.5   11.79 )-----------( Clumped    ( 2022 05:00 )             34.5     11-24    136  |  101  |  29.5<H>  ----------------------------<  150<H>  4.3   |  24.0  |  1.18    Ca    8.8      2022 05:00  Phos  5.2     11-24  Mg     2.0     11-24    TPro  6.1<L>  /  Alb  3.1<L>  /  TBili  0.5  /  DBili  x   /  AST  99<H>  /  ALT  20  /  AlkPhos  78  11-24      CARDIAC MARKERS ( 2022 05:00 )  x     / 1.97 ng/mL / 803 U/L / x     / 150.9 ng/mL  CARDIAC MARKERS ( 2022 22:15 )  x     / 1.97 ng/mL / 783 U/L / x     / 143.8 ng/mL      CAPILLARY BLOOD GLUCOSE        PT/INR - ( 2022 22:15 )   PT: 13.4 sec;   INR: 1.15 ratio         PTT - ( 2022 05:00 )  PTT:104.9 sec  Urinalysis Basic - ( 2022 23:45 )    Color: Yellow / Appearance: Clear / S.020 / pH: x  Gluc: x / Ketone: Moderate  / Bili: Negative / Urobili: Negative mg/dL   Blood: x / Protein: 15 / Nitrite: Negative   Leuk Esterase: Trace / RBC: 0-2 /HPF / WBC 0-2 /HPF   Sq Epi: x / Non Sq Epi: Occasional / Bacteria: x      CULTURES:      Physical Examination:  GENERAL: In NAD   HEENT: NC/AT  NECK: Supple, trachea midline  PULM: CTA anteriorly  CVS: +S1, S2  ABD: Soft, non-tender  EXTREMITIES: No pedal edema B/L  SKIN: No open wounds  NEURO: Grossly non-focal    DEVICES:     RADIOLOGY:  < from: Xray Chest 1 View-PORTABLE IMMEDIATE (Xray Chest 1 View-PORTABLE IMMEDIATE .) (22 @ 22:52) >    ACC: [id number] EXAM:  XR CHEST PORTABLE IMMED 1V                          PROCEDURE DATE:  2022          INTERPRETATION:  INDICATION: SGC and IABP Placement    COMPARISON: 2022 film obtained at 4:32 PM    FINDINGS:  Heart/Vascular: Catheter extends from below nodularity in the left   pulmonary outflow tract. No pneumothorax.  Pulmonary: Mild interstitial pattern greater on the right side  Bones: Unremarkable    Impression:  Increased interstitial opacity greater on the right side likely due to   interstitial edema    --- End of Report ---            ISABELLA HOLLIDAY MD; Attending Radiologist  This document has been electronically signed. 2022  7:19AM    < end of copied text >

## 2022-11-25 PROBLEM — Z00.00 ENCOUNTER FOR PREVENTIVE HEALTH EXAMINATION: Status: ACTIVE | Noted: 2022-11-25

## 2022-11-25 PROBLEM — E03.9 HYPOTHYROIDISM, UNSPECIFIED: Chronic | Status: ACTIVE | Noted: 2022-11-23

## 2022-11-25 PROBLEM — C50.919 MALIGNANT NEOPLASM OF UNSPECIFIED SITE OF UNSPECIFIED FEMALE BREAST: Chronic | Status: ACTIVE | Noted: 2022-11-23

## 2022-11-25 PROBLEM — Z86.79 PERSONAL HISTORY OF OTHER DISEASES OF THE CIRCULATORY SYSTEM: Chronic | Status: ACTIVE | Noted: 2022-11-23

## 2022-11-25 PROBLEM — I10 ESSENTIAL (PRIMARY) HYPERTENSION: Chronic | Status: ACTIVE | Noted: 2022-11-23

## 2022-11-25 PROBLEM — E78.5 HYPERLIPIDEMIA, UNSPECIFIED: Chronic | Status: ACTIVE | Noted: 2022-11-23

## 2022-11-25 LAB
ALBUMIN SERPL ELPH-MCNC: 3 G/DL — LOW (ref 3.3–5.2)
ALP SERPL-CCNC: 60 U/L — SIGNIFICANT CHANGE UP (ref 40–120)
ALT FLD-CCNC: 17 U/L — SIGNIFICANT CHANGE UP
ANION GAP SERPL CALC-SCNC: 11 MMOL/L — SIGNIFICANT CHANGE UP (ref 5–17)
ANION GAP SERPL CALC-SCNC: 13 MMOL/L — SIGNIFICANT CHANGE UP (ref 5–17)
ANION GAP SERPL CALC-SCNC: 9 MMOL/L — SIGNIFICANT CHANGE UP (ref 5–17)
APTT BLD: 25.5 SEC — LOW (ref 27.5–35.5)
APTT BLD: 38 SEC — HIGH (ref 27.5–35.5)
AST SERPL-CCNC: 60 U/L — HIGH
BILIRUB SERPL-MCNC: 0.3 MG/DL — LOW (ref 0.4–2)
BLD GP AB SCN SERPL QL: SIGNIFICANT CHANGE UP
BUN SERPL-MCNC: 40.6 MG/DL — HIGH (ref 8–20)
BUN SERPL-MCNC: 42.1 MG/DL — HIGH (ref 8–20)
BUN SERPL-MCNC: 45.1 MG/DL — HIGH (ref 8–20)
CALCIUM SERPL-MCNC: 8 MG/DL — LOW (ref 8.4–10.5)
CALCIUM SERPL-MCNC: 8.3 MG/DL — LOW (ref 8.4–10.5)
CALCIUM SERPL-MCNC: 8.6 MG/DL — SIGNIFICANT CHANGE UP (ref 8.4–10.5)
CHLORIDE SERPL-SCNC: 100 MMOL/L — SIGNIFICANT CHANGE UP (ref 96–108)
CHLORIDE SERPL-SCNC: 103 MMOL/L — SIGNIFICANT CHANGE UP (ref 96–108)
CHLORIDE SERPL-SCNC: 104 MMOL/L — SIGNIFICANT CHANGE UP (ref 96–108)
CO2 SERPL-SCNC: 17 MMOL/L — LOW (ref 22–29)
CO2 SERPL-SCNC: 24 MMOL/L — SIGNIFICANT CHANGE UP (ref 22–29)
CO2 SERPL-SCNC: 25 MMOL/L — SIGNIFICANT CHANGE UP (ref 22–29)
CREAT SERPL-MCNC: 1.12 MG/DL — SIGNIFICANT CHANGE UP (ref 0.5–1.3)
CREAT SERPL-MCNC: 1.21 MG/DL — SIGNIFICANT CHANGE UP (ref 0.5–1.3)
CREAT SERPL-MCNC: 1.41 MG/DL — HIGH (ref 0.5–1.3)
EGFR: 37 ML/MIN/1.73M2 — LOW
EGFR: 45 ML/MIN/1.73M2 — LOW
EGFR: 49 ML/MIN/1.73M2 — LOW
GLUCOSE SERPL-MCNC: 117 MG/DL — HIGH (ref 70–99)
GLUCOSE SERPL-MCNC: 137 MG/DL — HIGH (ref 70–99)
GLUCOSE SERPL-MCNC: 90 MG/DL — SIGNIFICANT CHANGE UP (ref 70–99)
HCT VFR BLD CALC: 28.4 % — LOW (ref 34.5–45)
HCT VFR BLD CALC: 31.1 % — LOW (ref 34.5–45)
HCT VFR BLD CALC: 32.3 % — LOW (ref 34.5–45)
HGB BLD-MCNC: 10.3 G/DL — LOW (ref 11.5–15.5)
HGB BLD-MCNC: 10.3 G/DL — LOW (ref 11.5–15.5)
HGB BLD-MCNC: 9.5 G/DL — LOW (ref 11.5–15.5)
MAGNESIUM SERPL-MCNC: 1.9 MG/DL — SIGNIFICANT CHANGE UP (ref 1.6–2.6)
MCHC RBC-ENTMCNC: 29.6 PG — SIGNIFICANT CHANGE UP (ref 27–34)
MCHC RBC-ENTMCNC: 29.9 PG — SIGNIFICANT CHANGE UP (ref 27–34)
MCHC RBC-ENTMCNC: 30.4 PG — SIGNIFICANT CHANGE UP (ref 27–34)
MCHC RBC-ENTMCNC: 31.9 GM/DL — LOW (ref 32–36)
MCHC RBC-ENTMCNC: 33.1 GM/DL — SIGNIFICANT CHANGE UP (ref 32–36)
MCHC RBC-ENTMCNC: 33.5 GM/DL — SIGNIFICANT CHANGE UP (ref 32–36)
MCV RBC AUTO: 89.4 FL — SIGNIFICANT CHANGE UP (ref 80–100)
MCV RBC AUTO: 90.7 FL — SIGNIFICANT CHANGE UP (ref 80–100)
MCV RBC AUTO: 93.9 FL — SIGNIFICANT CHANGE UP (ref 80–100)
PA ADP PRP-ACNC: 318 PRU — SIGNIFICANT CHANGE UP (ref 180–376)
PHOSPHATE SERPL-MCNC: 3.5 MG/DL — SIGNIFICANT CHANGE UP (ref 2.4–4.7)
PLATELET # BLD AUTO: 162 K/UL — SIGNIFICANT CHANGE UP (ref 150–400)
PLATELET # BLD AUTO: 162 K/UL — SIGNIFICANT CHANGE UP (ref 150–400)
PLATELET # BLD AUTO: SIGNIFICANT CHANGE UP K/UL (ref 150–400)
POTASSIUM SERPL-MCNC: 3.7 MMOL/L — SIGNIFICANT CHANGE UP (ref 3.5–5.3)
POTASSIUM SERPL-MCNC: 3.7 MMOL/L — SIGNIFICANT CHANGE UP (ref 3.5–5.3)
POTASSIUM SERPL-MCNC: 5.2 MMOL/L — SIGNIFICANT CHANGE UP (ref 3.5–5.3)
POTASSIUM SERPL-SCNC: 3.7 MMOL/L — SIGNIFICANT CHANGE UP (ref 3.5–5.3)
POTASSIUM SERPL-SCNC: 3.7 MMOL/L — SIGNIFICANT CHANGE UP (ref 3.5–5.3)
POTASSIUM SERPL-SCNC: 5.2 MMOL/L — SIGNIFICANT CHANGE UP (ref 3.5–5.3)
PROT SERPL-MCNC: 5.5 G/DL — LOW (ref 6.6–8.7)
RBC # BLD: 3.13 M/UL — LOW (ref 3.8–5.2)
RBC # BLD: 3.44 M/UL — LOW (ref 3.8–5.2)
RBC # BLD: 3.48 M/UL — LOW (ref 3.8–5.2)
RBC # FLD: 13.4 % — SIGNIFICANT CHANGE UP (ref 10.3–14.5)
RBC # FLD: 13.4 % — SIGNIFICANT CHANGE UP (ref 10.3–14.5)
RBC # FLD: 13.6 % — SIGNIFICANT CHANGE UP (ref 10.3–14.5)
SARS-COV-2 RNA SPEC QL NAA+PROBE: SIGNIFICANT CHANGE UP
SODIUM SERPL-SCNC: 134 MMOL/L — LOW (ref 135–145)
SODIUM SERPL-SCNC: 134 MMOL/L — LOW (ref 135–145)
SODIUM SERPL-SCNC: 138 MMOL/L — SIGNIFICANT CHANGE UP (ref 135–145)
TROPONIN T SERPL-MCNC: 1.52 NG/ML — HIGH (ref 0–0.06)
TROPONIN T SERPL-MCNC: 4.19 NG/ML — HIGH (ref 0–0.06)
WBC # BLD: 12.22 K/UL — HIGH (ref 3.8–10.5)
WBC # BLD: 12.68 K/UL — HIGH (ref 3.8–10.5)
WBC # BLD: 21.25 K/UL — HIGH (ref 3.8–10.5)
WBC # FLD AUTO: 12.22 K/UL — HIGH (ref 3.8–10.5)
WBC # FLD AUTO: 12.68 K/UL — HIGH (ref 3.8–10.5)
WBC # FLD AUTO: 21.25 K/UL — HIGH (ref 3.8–10.5)

## 2022-11-25 PROCEDURE — 93010 ELECTROCARDIOGRAM REPORT: CPT

## 2022-11-25 PROCEDURE — 93454 CORONARY ARTERY ANGIO S&I: CPT | Mod: 26,78

## 2022-11-25 PROCEDURE — 99233 SBSQ HOSP IP/OBS HIGH 50: CPT

## 2022-11-25 PROCEDURE — 93320 DOPPLER ECHO COMPLETE: CPT | Mod: 26

## 2022-11-25 PROCEDURE — 93325 DOPPLER ECHO COLOR FLOW MAPG: CPT | Mod: 26

## 2022-11-25 PROCEDURE — 99232 SBSQ HOSP IP/OBS MODERATE 35: CPT

## 2022-11-25 PROCEDURE — 99233 SBSQ HOSP IP/OBS HIGH 50: CPT | Mod: 25

## 2022-11-25 PROCEDURE — 71045 X-RAY EXAM CHEST 1 VIEW: CPT | Mod: 26

## 2022-11-25 PROCEDURE — 93312 ECHO TRANSESOPHAGEAL: CPT | Mod: 26

## 2022-11-25 PROCEDURE — 33967 INSERT I-AORT PERCUT DEVICE: CPT

## 2022-11-25 RX ORDER — NITROGLYCERIN 6.5 MG
60 CAPSULE, EXTENDED RELEASE ORAL
Qty: 50 | Refills: 0 | Status: DISCONTINUED | OUTPATIENT
Start: 2022-11-25 | End: 2022-11-27

## 2022-11-25 RX ORDER — CEFUROXIME AXETIL 250 MG
1500 TABLET ORAL ONCE
Refills: 0 | Status: COMPLETED | OUTPATIENT
Start: 2022-11-26 | End: 2022-11-26

## 2022-11-25 RX ORDER — FENTANYL CITRATE 50 UG/ML
25 INJECTION INTRAVENOUS ONCE
Refills: 0 | Status: DISCONTINUED | OUTPATIENT
Start: 2022-11-25 | End: 2022-11-25

## 2022-11-25 RX ORDER — ATROPINE SULFATE 0.1 MG/ML
0.5 SYRINGE (ML) INJECTION ONCE
Refills: 0 | Status: COMPLETED | OUTPATIENT
Start: 2022-11-25 | End: 2022-11-25

## 2022-11-25 RX ORDER — SODIUM CHLORIDE 9 MG/ML
1000 INJECTION INTRAMUSCULAR; INTRAVENOUS; SUBCUTANEOUS ONCE
Refills: 0 | Status: DISCONTINUED | OUTPATIENT
Start: 2022-11-25 | End: 2022-11-27

## 2022-11-25 RX ORDER — SENNA PLUS 8.6 MG/1
2 TABLET ORAL AT BEDTIME
Refills: 0 | Status: DISCONTINUED | OUTPATIENT
Start: 2022-11-25 | End: 2022-12-06

## 2022-11-25 RX ORDER — METOCLOPRAMIDE HCL 10 MG
10 TABLET ORAL ONCE
Refills: 0 | Status: COMPLETED | OUTPATIENT
Start: 2022-11-25 | End: 2022-11-25

## 2022-11-25 RX ORDER — AMLODIPINE BESYLATE 2.5 MG/1
2.5 TABLET ORAL DAILY
Refills: 0 | Status: DISCONTINUED | OUTPATIENT
Start: 2022-11-25 | End: 2022-11-25

## 2022-11-25 RX ORDER — CHLORHEXIDINE GLUCONATE 213 G/1000ML
15 SOLUTION TOPICAL
Refills: 0 | Status: DISCONTINUED | OUTPATIENT
Start: 2022-11-25 | End: 2022-11-27

## 2022-11-25 RX ORDER — HEPARIN SODIUM 5000 [USP'U]/ML
450 INJECTION INTRAVENOUS; SUBCUTANEOUS
Qty: 25000 | Refills: 0 | Status: DISCONTINUED | OUTPATIENT
Start: 2022-11-25 | End: 2022-11-25

## 2022-11-25 RX ORDER — CHLORHEXIDINE GLUCONATE 213 G/1000ML
1 SOLUTION TOPICAL
Refills: 0 | Status: DISCONTINUED | OUTPATIENT
Start: 2022-11-25 | End: 2022-11-27

## 2022-11-25 RX ORDER — VANCOMYCIN HCL 1 G
1000 VIAL (EA) INTRAVENOUS ONCE
Refills: 0 | Status: COMPLETED | OUTPATIENT
Start: 2022-11-26 | End: 2022-11-26

## 2022-11-25 RX ORDER — ONDANSETRON 8 MG/1
4 TABLET, FILM COATED ORAL ONCE
Refills: 0 | Status: COMPLETED | OUTPATIENT
Start: 2022-11-25 | End: 2022-11-25

## 2022-11-25 RX ORDER — PHENYLEPHRINE HYDROCHLORIDE 10 MG/ML
0.3 INJECTION INTRAVENOUS
Qty: 40 | Refills: 0 | Status: DISCONTINUED | OUTPATIENT
Start: 2022-11-25 | End: 2022-11-26

## 2022-11-25 RX ORDER — HEPARIN SODIUM 5000 [USP'U]/ML
300 INJECTION INTRAVENOUS; SUBCUTANEOUS
Qty: 25000 | Refills: 0 | Status: DISCONTINUED | OUTPATIENT
Start: 2022-11-25 | End: 2022-11-26

## 2022-11-25 RX ADMIN — CHLORHEXIDINE GLUCONATE 1 APPLICATION(S): 213 SOLUTION TOPICAL at 21:04

## 2022-11-25 RX ADMIN — HEPARIN SODIUM 450 UNIT(S)/HR: 5000 INJECTION INTRAVENOUS; SUBCUTANEOUS at 08:22

## 2022-11-25 RX ADMIN — FENTANYL CITRATE 25 MICROGRAM(S): 50 INJECTION INTRAVENOUS at 15:30

## 2022-11-25 RX ADMIN — MUPIROCIN 1 APPLICATION(S): 20 OINTMENT TOPICAL at 21:04

## 2022-11-25 RX ADMIN — Medication 112 MICROGRAM(S): at 05:25

## 2022-11-25 RX ADMIN — ATORVASTATIN CALCIUM 80 MILLIGRAM(S): 80 TABLET, FILM COATED ORAL at 21:04

## 2022-11-25 RX ADMIN — Medication 81 MILLIGRAM(S): at 11:50

## 2022-11-25 RX ADMIN — SENNA PLUS 2 TABLET(S): 8.6 TABLET ORAL at 21:03

## 2022-11-25 RX ADMIN — Medication 25 MILLIGRAM(S): at 11:51

## 2022-11-25 RX ADMIN — ONDANSETRON 4 MILLIGRAM(S): 8 TABLET, FILM COATED ORAL at 16:11

## 2022-11-25 RX ADMIN — MUPIROCIN 1 APPLICATION(S): 20 OINTMENT TOPICAL at 05:22

## 2022-11-25 RX ADMIN — Medication 25 MILLIGRAM(S): at 02:07

## 2022-11-25 RX ADMIN — SERTRALINE 100 MILLIGRAM(S): 25 TABLET, FILM COATED ORAL at 11:50

## 2022-11-25 RX ADMIN — AMLODIPINE BESYLATE 2.5 MILLIGRAM(S): 2.5 TABLET ORAL at 10:38

## 2022-11-25 RX ADMIN — HEPARIN SODIUM 750 UNIT(S)/HR: 5000 INJECTION INTRAVENOUS; SUBCUTANEOUS at 08:47

## 2022-11-25 RX ADMIN — PANTOPRAZOLE SODIUM 40 MILLIGRAM(S): 20 TABLET, DELAYED RELEASE ORAL at 11:51

## 2022-11-25 RX ADMIN — Medication 0.5 MILLIGRAM(S): at 16:10

## 2022-11-25 RX ADMIN — Medication 25 MILLIGRAM(S): at 05:25

## 2022-11-25 RX ADMIN — FENTANYL CITRATE 25 MICROGRAM(S): 50 INJECTION INTRAVENOUS at 15:00

## 2022-11-25 RX ADMIN — Medication 10 MILLIGRAM(S): at 16:11

## 2022-11-25 NOTE — PROGRESS NOTE ADULT - SUBJECTIVE AND OBJECTIVE BOX
Brief summary:  82F PMH LBBB, HTN, HLD, MVP, hypothyroidism, remote breast cancer, presented to [place] 11/22 with NSTEMI, LHC revealed multivessel disease, L- IABP placed and patient transferred to [facility] for cardiac surgery eval.     Daily events:  Left IABP removed. Patient bradycardic and hypotensive immediately thereafter. Given fluids and started on Angelo gtt.   Taken to cath lab for IABP reinsertion.   At bedside c/o 8/10 non radiating chest pain similar to what brought her to the hospital, not alleviated by Fentanyl gtt, associated with headache and nausea. Denies SOB.     PAST MEDICAL & SURGICAL HISTORY:  Hypertension      Hyperlipidemia      Hypothyroid      Breast cancer      History of mitral valve prolapse      S/P mastectomy, left      Medications:  amLODIPine   Tablet 2.5 milliGRAM(s) Oral daily  aspirin enteric coated 81 milliGRAM(s) Oral daily  atorvastatin 80 milliGRAM(s) Oral at bedtime  atropine Injectable 0.5 milliGRAM(s) IntraMuscular once  fentaNYL    Injectable 25 MICROGram(s) IV Push once  heparin   Injectable 3800 Unit(s) IV Push every 6 hours PRN  heparin  Infusion. 450 Unit(s)/Hr IV Continuous <Continuous>  levothyroxine 112 MICROGram(s) Oral daily  metoclopramide Injectable 10 milliGRAM(s) IV Push once  metoprolol tartrate 25 milliGRAM(s) Oral every 6 hours  mupirocin 2% Nasal 1 Application(s) Both Nostrils two times a day  ondansetron Injectable 4 milliGRAM(s) IV Push once  pantoprazole  Injectable 40 milliGRAM(s) IV Push daily  phenylephrine    Infusion 0.3 MICROgram(s)/kG/Min IV Continuous <Continuous>  sertraline 100 milliGRAM(s) Oral daily      MEDICATIONS  (PRN):  heparin   Injectable 3800 Unit(s) IV Push every 6 hours PRN For aPTT less than 40      Daily Review:                          10.3   21.25 )-----------( Clumped    ( 25 Nov 2022 15:30 )             32.3   11-25    138  |  103  |  42.1<H>  ----------------------------<  117<H>  3.7   |  24.0  |  1.21    Ca    8.6      25 Nov 2022 14:25  Phos  3.5     11-25  Mg     1.9     11-25    TPro  6.1<L>  /  Alb  3.1<L>  /  TBili  0.5  /  DBili  x   /  AST  99<H>  /  ALT  20  /  AlkPhos  78  11-24    CARDIAC MARKERS ( 24 Nov 2022 05:00 )  x     / 1.97 ng/mL / 803 U/L / x     / 150.9 ng/mL  CARDIAC MARKERS ( 23 Nov 2022 22:15 )  x     / 1.97 ng/mL / 783 U/L / x     / 143.8 ng/mL    PT/INR - ( 23 Nov 2022 22:15 )   PT: 13.4 sec;   INR: 1.15 ratio         PTT - ( 25 Nov 2022 13:01 )  PTT:25.5 sec    T(C): 36.4 (11-25-22 @ 11:23), Max: 36.7 (11-24-22 @ 20:00)  HR: 71 (11-25-22 @ 14:09) (55 - 82)  BP: 164/95 (11-25-22 @ 06:00) (123/99 - 164/95)  RR: 18 (11-25-22 @ 14:09) (0 - 24)  SpO2: 99% (11-25-22 @ 14:09) (91% - 100%)  Wt(kg): --    CAPILLARY BLOOD GLUCOSE    I&O's Summary    24 Nov 2022 07:01  -  25 Nov 2022 07:00  --------------------------------------------------------  IN: 125.5 mL / OUT: 780 mL / NET: -654.5 mL    25 Nov 2022 07:01  -  25 Nov 2022 16:06  --------------------------------------------------------  IN: 12 mL / OUT: 290 mL / NET: -278 mL        Physical Exam  General: pale, mild distress   Neuro: AxO x3, non-focal, MOSHER  Cardiac: S1S2, no murmurs  Pulm: CTA b/l, no wheezing or rales  Abdomen: Soft, NT, ND  Peripheral: +DP pulses b/l, no peripheral edema                Brief summary:  82F PMH LBBB, HTN, HLD, MVP, hypothyroidism, remote breast cancer, presented to [place] 11/22 with NSTEMI, LHC revealed multivessel disease, R- IABP placed and patient transferred to [facility] for cardiac surgery eval.     Daily events:  Right IABP removed. Patient bradycardic and hypotensive immediately thereafter. Given fluids and started on Angelo gtt.   Taken to cath lab for LEFT IABP reinsertion.   At bedside c/o 8/10 non radiating chest pain similar to what brought her to the hospital, not alleviated by Fentanyl gtt, associated with headache and nausea. Denies SOB.     PAST MEDICAL & SURGICAL HISTORY:  Hypertension      Hyperlipidemia      Hypothyroid      Breast cancer      History of mitral valve prolapse      S/P mastectomy, left      Medications:  amLODIPine   Tablet 2.5 milliGRAM(s) Oral daily  aspirin enteric coated 81 milliGRAM(s) Oral daily  atorvastatin 80 milliGRAM(s) Oral at bedtime  atropine Injectable 0.5 milliGRAM(s) IntraMuscular once  fentaNYL    Injectable 25 MICROGram(s) IV Push once  heparin   Injectable 3800 Unit(s) IV Push every 6 hours PRN  heparin  Infusion. 450 Unit(s)/Hr IV Continuous <Continuous>  levothyroxine 112 MICROGram(s) Oral daily  metoclopramide Injectable 10 milliGRAM(s) IV Push once  metoprolol tartrate 25 milliGRAM(s) Oral every 6 hours  mupirocin 2% Nasal 1 Application(s) Both Nostrils two times a day  ondansetron Injectable 4 milliGRAM(s) IV Push once  pantoprazole  Injectable 40 milliGRAM(s) IV Push daily  phenylephrine    Infusion 0.3 MICROgram(s)/kG/Min IV Continuous <Continuous>  sertraline 100 milliGRAM(s) Oral daily      MEDICATIONS  (PRN):  heparin   Injectable 3800 Unit(s) IV Push every 6 hours PRN For aPTT less than 40      Daily Review:                          10.3   21.25 )-----------( Clumped    ( 25 Nov 2022 15:30 )             32.3   11-25    138  |  103  |  42.1<H>  ----------------------------<  117<H>  3.7   |  24.0  |  1.21    Ca    8.6      25 Nov 2022 14:25  Phos  3.5     11-25  Mg     1.9     11-25    TPro  6.1<L>  /  Alb  3.1<L>  /  TBili  0.5  /  DBili  x   /  AST  99<H>  /  ALT  20  /  AlkPhos  78  11-24    CARDIAC MARKERS ( 24 Nov 2022 05:00 )  x     / 1.97 ng/mL / 803 U/L / x     / 150.9 ng/mL  CARDIAC MARKERS ( 23 Nov 2022 22:15 )  x     / 1.97 ng/mL / 783 U/L / x     / 143.8 ng/mL    PT/INR - ( 23 Nov 2022 22:15 )   PT: 13.4 sec;   INR: 1.15 ratio         PTT - ( 25 Nov 2022 13:01 )  PTT:25.5 sec    T(C): 36.4 (11-25-22 @ 11:23), Max: 36.7 (11-24-22 @ 20:00)  HR: 71 (11-25-22 @ 14:09) (55 - 82)  BP: 164/95 (11-25-22 @ 06:00) (123/99 - 164/95)  RR: 18 (11-25-22 @ 14:09) (0 - 24)  SpO2: 99% (11-25-22 @ 14:09) (91% - 100%)  Wt(kg): --    CAPILLARY BLOOD GLUCOSE    I&O's Summary    24 Nov 2022 07:01  -  25 Nov 2022 07:00  --------------------------------------------------------  IN: 125.5 mL / OUT: 780 mL / NET: -654.5 mL    25 Nov 2022 07:01  -  25 Nov 2022 16:06  --------------------------------------------------------  IN: 12 mL / OUT: 290 mL / NET: -278 mL        Physical Exam  General: pale, mild distress   Neuro: AxO x3, non-focal, MOSHER  Cardiac: S1S2, no murmurs  Pulm: CTA b/l, no wheezing or rales  Abdomen: Soft, NT, ND  Peripheral: +DP pulses b/l, no peripheral edema

## 2022-11-25 NOTE — PROGRESS NOTE ADULT - SUBJECTIVE AND OBJECTIVE BOX
[facility]                                                         CARDIOLOGY AT [place] [address]                                                         Telephone: [phone]. Fax:[phone]                                                                             PROGRESS NOTE    Reason for follow up: NSTEmi  Update:  Feeling good no chest pain b/p s > 160  On balloon pump  PAS/PAD 36/16  S/p AGUS with moderate MR b/p dropped with dropped with propofol     Review of symptoms:   Cardiac:  No chest pain. No dyspnea. No palpitations.  Respiratory: no cough. No dyspnea  Gastrointestinal: No diarrhea. No abdominal pain. No bleeding.   Neuro: No focal neuro complaints.      Vitals:  T(C): 36.5 (11-25-22 @ 07:34), Max: 36.7 (11-24-22 @ 20:00)  HR: 78 (11-25-22 @ 09:00) (54 - 80)  BP: 164/95 (11-25-22 @ 06:00) (123/99 - 168/63)  RR: 17 (11-25-22 @ 09:00) (11 - 21)  SpO2: 100% (11-25-22 @ 09:00) (91% - 100%)  Wt(kg): --  I&O's Summary    24 Nov 2022 07:01  -  25 Nov 2022 07:00  --------------------------------------------------------  IN: 125.5 mL / OUT: 780 mL / NET: -654.5 mL    25 Nov 2022 07:01  -  25 Nov 2022 09:25  --------------------------------------------------------  IN: 12 mL / OUT: 125 mL / NET: -113 mL      Weight (kg): 65.4 (11-23 @ 21:15)      PHYSICAL EXAM:  Appearance: Comfortable. No acute distress  HEENT:  Atraumatic. Normocephalic.  Normal oral mucosa  Neurologic: A & O x 3, no gross focal deficits.  Cardiovascular: RRR S1 S2,+ murmur  Respiratory: Lungs clear to auscultation, unlabored   Gastrointestinal:  Soft, Non-tender, + BS  Lower Extremities: No edema  Right groin with swan and ballon pump  Psychiatry: Patient is calm. No agitation.   Skin: warm and dry.      CURRENT MEDICATIONS:  MEDICATIONS  (STANDING):  aspirin enteric coated 81 milliGRAM(s) Oral daily  atorvastatin 80 milliGRAM(s) Oral at bedtime  heparin  Infusion. 450 Unit(s)/Hr (4.5 mL/Hr) IV Continuous <Continuous>  levothyroxine 112 MICROGram(s) Oral daily  metoprolol tartrate 25 milliGRAM(s) Oral every 6 hours  mupirocin 2% Nasal 1 Application(s) Both Nostrils two times a day  pantoprazole  Injectable 40 milliGRAM(s) IV Push daily  sertraline 100 milliGRAM(s) Oral daily      LABS:	 	  CARDIAC MARKERS ( 24 Nov 2022 05:00 )  x     / 1.97 ng/mL / 803 U/L / x     / 150.9 ng/mL  p-BNP 24 Nov 2022 05:00: 33406 pg/mL, CARDIAC MARKERS ( 23 Nov 2022 22:15 )  x     / 1.97 ng/mL / 783 U/L / x     / 143.8 ng/mL  p-BNP 23 Nov 2022 22:15: 27955 pg/mL                          9.5    12.22 )-----------( 162      ( 25 Nov 2022 04:50 )             28.4     11-25    134<L>  |  100  |  45.1<H>  ----------------------------<  90  3.7   |  25.0  |  1.41<H>    Ca    8.3<L>      25 Nov 2022 04:50  Phos  3.5     11-25  Mg     1.9     11-25    TPro  6.1<L>  /  Alb  3.1<L>  /  TBili  0.5  /  DBili  x   /  AST  99<H>  /  ALT  20  /  AlkPhos  78  11-24    proBNP: Serum Pro-Brain Natriuretic Peptide: 57785 pg/mL (11-24 @ 05:00)  Serum Pro-Brain Natriuretic Peptide: 39737 pg/mL (11-23 @ 22:15)    Lipid Profile: Date: 11-24 @ 05:00  Total cholesterol 195; Direct LDL: --; HDL: 65; Triglycerides:106    HgA1c:   TSH: Thyroid Stimulating Hormone, Serum: 1.32 uIU/mL  Telemetry  Sinus    DIAGNOSTIC TESTING:  < from: TTE Echo Complete w/ Contrast w/ Doppler (11.24.22 @ 12:02) >   1. Technically difficult study.   2. Left ventricular ejection fraction, by visual estimation, is 45 to   50%.   3. Multiple left ventricular regional wall motion abnormalities exist.   See wall motion findings.   4. Severe mitral valve regurgitation.   5. Moderate to severe mitral annular calcification.   6. Thickening of the anterior mitral valve leaflet.   7. Normal right ventricular size and function.   8. Normal left atrial size.   9. Normal right atrial size.  10. Mild tricuspid regurgitation.  11. Sclerotic aortic valve with normal opening.

## 2022-11-25 NOTE — DIETITIAN INITIAL EVALUATION ADULT - ORAL INTAKE PTA/DIET HISTORY
Pt reports eating poorly over past few months secondary to hx of hiatal hernia. Pt complains of nausea and bloating after meals and has been trying to stick to a Vegan diet. Pt endorses 10lb unintentional weight loss over past few months. Pt is currently NPO for procedure this morning.

## 2022-11-25 NOTE — DIETITIAN NUTRITION RISK NOTIFICATION - TREATMENT: THE FOLLOWING DIET HAS BEEN RECOMMENDED
Diet, DASH/TLC:   Sodium & Cholesterol Restricted (11-24-22 @ 19:34) [Active]  Diet, NPO after Midnight:      NPO Start Date: 24-Nov-2022,   NPO Start Time: 23:59 (11-24-22 @ 18:24) [Active]

## 2022-11-25 NOTE — PROGRESS NOTE ADULT - SUBJECTIVE AND OBJECTIVE BOX
CCL NP    Was asked by cardiology service to remove IABP.  Disussed with CTS JOANN Colon and agreed with removal of IABP. Heparin drip was d/c'd 4 hours prior and PTT 25.    Right femoral IABP removed.  Manual pressure held X 20 minutes with good control.  Right femoral sheath removed.  Manual pressure held.  Pt became hypotensive and bradycardic.  Half amp of atropine given and IVF initiated.   Pt became nauseous and vomited.  Zofran given.  Groin site stable. No bleeding/hematoma/ecchymosis.  Pt with c/o 9/10 chest pain same as prior.  Stat echo done with no effusion. MICU, Cardiology, IC cardiology and CTS consulted and at bedside.   Angelo required to keep B/P.  Family at bedside and aware of status.  Pt taken urgently to Cath lab for IABP.  CTS will follow pt post for possible CABG/MVR.

## 2022-11-25 NOTE — DIETITIAN INITIAL EVALUATION ADULT - PERTINENT MEDS FT
MEDICATIONS  (STANDING):  amLODIPine   Tablet 2.5 milliGRAM(s) Oral daily  aspirin enteric coated 81 milliGRAM(s) Oral daily  atorvastatin 80 milliGRAM(s) Oral at bedtime  heparin  Infusion. 450 Unit(s)/Hr (4.5 mL/Hr) IV Continuous <Continuous>  levothyroxine 112 MICROGram(s) Oral daily  metoprolol tartrate 25 milliGRAM(s) Oral every 6 hours  mupirocin 2% Nasal 1 Application(s) Both Nostrils two times a day  pantoprazole  Injectable 40 milliGRAM(s) IV Push daily  sertraline 100 milliGRAM(s) Oral daily    MEDICATIONS  (PRN):  heparin   Injectable 3800 Unit(s) IV Push every 6 hours PRN For aPTT less than 40

## 2022-11-25 NOTE — DIETITIAN INITIAL EVALUATION ADULT - PERTINENT LABORATORY DATA
11-25    134<L>  |  100  |  45.1<H>  ----------------------------<  90  3.7   |  25.0  |  1.41<H>    Ca    8.3<L>      25 Nov 2022 04:50  Phos  3.5     11-25  Mg     1.9     11-25    TPro  6.1<L>  /  Alb  3.1<L>  /  TBili  0.5  /  DBili  x   /  AST  99<H>  /  ALT  20  /  AlkPhos  78  11-24  A1C with Estimated Average Glucose Result: 5.4 % (11-24-22 @ 05:00)

## 2022-11-25 NOTE — DIETITIAN INITIAL EVALUATION ADULT - NS FNS DIET ORDER
Diet, DASH/TLC:   Sodium & Cholesterol Restricted (11-24-22 @ 19:34)  Diet, NPO after Midnight:      NPO Start Date: 24-Nov-2022,   NPO Start Time: 23:59 (11-24-22 @ 18:24)

## 2022-11-25 NOTE — PROGRESS NOTE ADULT - TIME BILLING
reviewing labs, notes, orders, radiographic studies, as well as counseling and coordinating care with the relevant multidisciplinary team, including with the primary and consulting providers.
coordinating care with cardiology attending, MICU PA/resident, MICU RN, counseling patient.

## 2022-11-25 NOTE — DIETITIAN INITIAL EVALUATION ADULT - ETIOLOGY
Related to inability to meet sufficient protein energy requirements with altered GI function in setting of Hiatel hernia

## 2022-11-25 NOTE — PROGRESS NOTE ADULT - SUBJECTIVE AND OBJECTIVE BOX
CCL NP Update:    Transferred from [place] on 11/23/22    81 yo female, PMHx of LBBB, HTN, HLD, mitral valve prolapse, hypothyroidism, breast CA 10 years ago s/p chemotherapy and left mastectomy, admitted to [facility] 11/22 after experiencing acute onset chest pain with radiation to back that onset the night prior. Ruled in for NSTEMI with positive troponin 430 --> 446. She underwent diagnostic cardiac catheterization with Dr. Garibay, which revealed triple vessel CAD, with severe LAD, LCx, and occlusion of RCA. Ventriculogram with EF 30% with concerns for severe MR. She was given ASA, Plavix load 600mg, and placed on Heparin gtt for ACS. An IABP and SGC were placed and she was transferred to [facility] MICU for further evaluation and medical management. Upon arrival to MICU, hemodynamics stable, ADBP >170 on 1:1. Patient was on 100% NRB weaned to 3lpm nasal cannula laying supine comfortably. Denies chest pain, dyspnea, palpitations, orthopnea, LE edema.       No acute events overnight  NAD and HDS, denies CP or SOB  IABP 1:1 support, access site stable.  + doppler pulse  heparin ACS protocol in progress  CBC/chem stable  AGUS pending  CTS following.  No urgent surgical intervention    Vital Signs Last 24 Hrs  T(C): 36.5 (25 Nov 2022 07:34), Max: 36.7 (24 Nov 2022 20:00)  T(F): 97.7 (25 Nov 2022 07:34), Max: 98.1 (24 Nov 2022 20:00)  HR: 80 (25 Nov 2022 07:00) (53 - 80)  BP: 164/95 (25 Nov 2022 06:00) (123/99 - 168/63)  BP(mean): 115 (25 Nov 2022 06:00) (80 - 115)  RR: 11 (25 Nov 2022 07:00) (11 - 21)  SpO2: 99% (25 Nov 2022 07:00) (91% - 100%)    -B/P stable would considering weaning  IABP if pt sonu please call for CCL NP to removed device

## 2022-11-25 NOTE — CHART NOTE - NSCHARTNOTEFT_GEN_A_CORE
I was called to bedside after balloon pump removed.  Patient became bradycardic and hypotensive immediately thereafter, given atropine and phenylephrine, fluid bolus.  Now patient complaining of nausea and epigastric pain.  Repeat EKG, echo being performed.  Labs pending - rule out bleeding.    May need IABP replaced, cardiology and Cardiac surgery also at bedside.   Updated daughter.

## 2022-11-25 NOTE — PROGRESS NOTE ADULT - SUBJECTIVE AND OBJECTIVE BOX
Interval HPI:  feels better today  underwent AGUS at bedside  placed on IABP 1:2 - tolerated well without chest pain or drop in BP    Exam:  alert and oriented, nad, pleasant and cooperative  reg rhythm  lungs clear  abd soft  trace LE edema  no groin hematoma seen  no pain in leg  IABP and swan in right groin    On Admission  22 (2d)  HPI:  81 yo female, PMHx of LBBB, HTN, HLD, mitral valve prolapse, hypothyroidism, breast CA 10 years ago s/p chemotherapy and left mastectomy, admitted to [facility]  after experiencing acute onset chest pain with radiation to back that onset the night prior. Ruled in for NSTEMI with positive troponin 430 --> 446. She underwent diagnostic cardiac catheterization with Dr. Garibay, which revealed triple vessel CAD, with severe LAD, LCx, and occlusion of RCA. Ventriculogram with EF 30% with concerns for severe MR. She was given ASA, Plavix load 600mg, and placed on Heparin gtt for ACS. An IABP and SGC were placed and she was transferred to [facility] MICU for further evaluation and medical management. Upon arrival to MICU, hemodynamics stable, ADBP >170 on 1:1. Patient was on 100% NRB weaned to 3lpm nasal cannula laying supine comfortably. Denies chest pain, dyspnea, palpitations, orthopnea, LE edema.  (2022 21:40)    PAST MEDICAL & SURGICAL HISTORY:  Hypertension      Hyperlipidemia      Hypothyroid      Breast cancer      History of mitral valve prolapse      S/P mastectomy, left          Antimicrobial:    Cardiovascular:  amLODIPine   Tablet 2.5 milliGRAM(s) Oral daily  metoprolol tartrate 25 milliGRAM(s) Oral every 6 hours    Pulmonary:    Hematalogic:  aspirin enteric coated 81 milliGRAM(s) Oral daily  heparin   Injectable 3800 Unit(s) IV Push every 6 hours PRN  heparin  Infusion. 450 Unit(s)/Hr IV Continuous <Continuous>    Other:  atorvastatin 80 milliGRAM(s) Oral at bedtime  levothyroxine 112 MICROGram(s) Oral daily  mupirocin 2% Nasal 1 Application(s) Both Nostrils two times a day  pantoprazole  Injectable 40 milliGRAM(s) IV Push daily  sertraline 100 milliGRAM(s) Oral daily      Drug Dosing Weight  Height (cm): 160 (2022 21:15)  Weight (kg): 65.4 (2022 21:15)  BMI (kg/m2): 25.5 (2022 21:15)  BSA (m2): 1.68 (2022 21:15)    T(C): 36.4 (22 @ 11:23), Max: 36.7 (22 @ 20:00)  HR: 76 (22 @ 13:30)  BP: 164/95 (22 @ 06:00)  BP(mean): 115 (22 @ 06:00)  ABP: 138/60 (22 @ 13:30)  ABP(mean): 91 (22 @ 13:30)  RR: 13 (22 @ 13:30)  SpO2: 94% (22 @ 13:30)           @ 07:01  -   @ 07:00  --------------------------------------------------------  IN: 125.5 mL / OUT: 780 mL / NET: -654.5 mL              LABS:  CBC Full  -  ( 2022 04:50 )  WBC Count : 12.22 K/uL  RBC Count : 3.13 M/uL  Hemoglobin : 9.5 g/dL  Hematocrit : 28.4 %  Platelet Count - Automated : 162 K/uL  Mean Cell Volume : 90.7 fl  Mean Cell Hemoglobin : 30.4 pg  Mean Cell Hemoglobin Concentration : 33.5 gm/dL  Auto Neutrophil # : x  Auto Lymphocyte # : x  Auto Monocyte # : x  Auto Eosinophil # : x  Auto Basophil # : x  Auto Neutrophil % : x  Auto Lymphocyte % : x  Auto Monocyte % : x  Auto Eosinophil % : x  Auto Basophil % : x        134<L>  |  100  |  45.1<H>  ----------------------------<  90  3.7   |  25.0  |  1.41<H>    Ca    8.3<L>      2022 04:50  Phos  3.5       Mg     1.9         TPro  6.1<L>  /  Alb  3.1<L>  /  TBili  0.5  /  DBili  x   /  AST  99<H>  /  ALT  20  /  AlkPhos  78      PT/INR - ( 2022 22:15 )   PT: 13.4 sec;   INR: 1.15 ratio         PTT - ( 2022 05:38 )  PTT:38.0 sec  Urinalysis Basic - ( 2022 23:45 )    Color: Yellow / Appearance: Clear / S.020 / pH: x  Gluc: x / Ketone: Moderate  / Bili: Negative / Urobili: Negative mg/dL   Blood: x / Protein: 15 / Nitrite: Negative   Leuk Esterase: Trace / RBC: 0-2 /HPF / WBC 0-2 /HPF   Sq Epi: x / Non Sq Epi: Occasional / Bacteria: x        ____________________________________________________________________________________________________

## 2022-11-25 NOTE — DIETITIAN INITIAL EVALUATION ADULT - OTHER INFO
Pt is a 82 year old F w/ PMHx of CAD, LBBB, HTN, HLD admitted to [facility]  after experiencing acute onset chest pain with radiation to back.  Found to have NSTEMI and underwent cardiac cath that revealed triple vessel CAD, placed on Heparin gtt and loaded with Plavix.  TTE also confirmed severe MR. Patient transferred to [facility] ICU for further evaluation and medical management on 11/23.  Patient with right femoral access site (sheet) and [place] Cath in place conformed by bedside CXR this evening.  Pt Currently on IABP and heparin drip

## 2022-11-26 ENCOUNTER — TRANSCRIPTION ENCOUNTER (OUTPATIENT)
Age: 82
End: 2022-11-26

## 2022-11-26 DIAGNOSIS — I21.4 NON-ST ELEVATION (NSTEMI) MYOCARDIAL INFARCTION: ICD-10-CM

## 2022-11-26 LAB
ALBUMIN SERPL ELPH-MCNC: 3.3 G/DL — SIGNIFICANT CHANGE UP (ref 3.3–5.2)
ALP SERPL-CCNC: 66 U/L — SIGNIFICANT CHANGE UP (ref 40–120)
ALT FLD-CCNC: 22 U/L — SIGNIFICANT CHANGE UP
ANION GAP SERPL CALC-SCNC: 11 MMOL/L — SIGNIFICANT CHANGE UP (ref 5–17)
ANISOCYTOSIS BLD QL: SLIGHT — SIGNIFICANT CHANGE UP
APTT BLD: 25.5 SEC — LOW (ref 27.5–35.5)
APTT BLD: 26 SEC — LOW (ref 27.5–35.5)
AST SERPL-CCNC: 117 U/L — HIGH
BASOPHILS # BLD AUTO: 0.1 K/UL — SIGNIFICANT CHANGE UP (ref 0–0.2)
BASOPHILS NFR BLD AUTO: 0.9 % — SIGNIFICANT CHANGE UP (ref 0–2)
BILIRUB SERPL-MCNC: 0.4 MG/DL — SIGNIFICANT CHANGE UP (ref 0.4–2)
BLD GP AB SCN SERPL QL: SIGNIFICANT CHANGE UP
BUN SERPL-MCNC: 38.7 MG/DL — HIGH (ref 8–20)
CALCIUM SERPL-MCNC: 8.8 MG/DL — SIGNIFICANT CHANGE UP (ref 8.4–10.5)
CHLORIDE SERPL-SCNC: 104 MMOL/L — SIGNIFICANT CHANGE UP (ref 96–108)
CK MB CFR SERPL CALC: 38 NG/ML — HIGH (ref 0–6.7)
CK SERPL-CCNC: 344 U/L — HIGH (ref 25–170)
CO2 SERPL-SCNC: 25 MMOL/L — SIGNIFICANT CHANGE UP (ref 22–29)
CREAT SERPL-MCNC: 1 MG/DL — SIGNIFICANT CHANGE UP (ref 0.5–1.3)
EGFR: 56 ML/MIN/1.73M2 — LOW
EOSINOPHIL # BLD AUTO: 0.19 K/UL — SIGNIFICANT CHANGE UP (ref 0–0.5)
EOSINOPHIL NFR BLD AUTO: 1.7 % — SIGNIFICANT CHANGE UP (ref 0–6)
GLUCOSE SERPL-MCNC: 109 MG/DL — HIGH (ref 70–99)
HCT VFR BLD CALC: 22.7 % — LOW (ref 34.5–45)
HCT VFR BLD CALC: 24.7 % — LOW (ref 34.5–45)
HCT VFR BLD CALC: 30.5 % — LOW (ref 34.5–45)
HGB BLD-MCNC: 10.6 G/DL — LOW (ref 11.5–15.5)
HGB BLD-MCNC: 7.7 G/DL — LOW (ref 11.5–15.5)
HGB BLD-MCNC: 8.3 G/DL — LOW (ref 11.5–15.5)
LYMPHOCYTES # BLD AUTO: 1.5 K/UL — SIGNIFICANT CHANGE UP (ref 1–3.3)
LYMPHOCYTES # BLD AUTO: 13.1 % — SIGNIFICANT CHANGE UP (ref 13–44)
MACROCYTES BLD QL: SLIGHT — SIGNIFICANT CHANGE UP
MAGNESIUM SERPL-MCNC: 1.9 MG/DL — SIGNIFICANT CHANGE UP (ref 1.6–2.6)
MANUAL SMEAR VERIFICATION: SIGNIFICANT CHANGE UP
MCHC RBC-ENTMCNC: 30.3 PG — SIGNIFICANT CHANGE UP (ref 27–34)
MCHC RBC-ENTMCNC: 30.5 PG — SIGNIFICANT CHANGE UP (ref 27–34)
MCHC RBC-ENTMCNC: 30.8 PG — SIGNIFICANT CHANGE UP (ref 27–34)
MCHC RBC-ENTMCNC: 33.6 GM/DL — SIGNIFICANT CHANGE UP (ref 32–36)
MCHC RBC-ENTMCNC: 33.9 GM/DL — SIGNIFICANT CHANGE UP (ref 32–36)
MCHC RBC-ENTMCNC: 34.8 GM/DL — SIGNIFICANT CHANGE UP (ref 32–36)
MCV RBC AUTO: 87.9 FL — SIGNIFICANT CHANGE UP (ref 80–100)
MCV RBC AUTO: 90.1 FL — SIGNIFICANT CHANGE UP (ref 80–100)
MCV RBC AUTO: 90.8 FL — SIGNIFICANT CHANGE UP (ref 80–100)
MONOCYTES # BLD AUTO: 0.6 K/UL — SIGNIFICANT CHANGE UP (ref 0–0.9)
MONOCYTES NFR BLD AUTO: 5.2 % — SIGNIFICANT CHANGE UP (ref 2–14)
NEUTROPHILS # BLD AUTO: 9.06 K/UL — HIGH (ref 1.8–7.4)
NEUTROPHILS NFR BLD AUTO: 79.1 % — HIGH (ref 43–77)
OVALOCYTES BLD QL SMEAR: SLIGHT — SIGNIFICANT CHANGE UP
PLAT MORPH BLD: NORMAL — SIGNIFICANT CHANGE UP
PLATELET # BLD AUTO: 120 K/UL — LOW (ref 150–400)
PLATELET # BLD AUTO: 151 K/UL — SIGNIFICANT CHANGE UP (ref 150–400)
PLATELET # BLD AUTO: 153 K/UL — SIGNIFICANT CHANGE UP (ref 150–400)
POIKILOCYTOSIS BLD QL AUTO: SLIGHT — SIGNIFICANT CHANGE UP
POLYCHROMASIA BLD QL SMEAR: SLIGHT — SIGNIFICANT CHANGE UP
POTASSIUM SERPL-MCNC: 4 MMOL/L — SIGNIFICANT CHANGE UP (ref 3.5–5.3)
POTASSIUM SERPL-SCNC: 4 MMOL/L — SIGNIFICANT CHANGE UP (ref 3.5–5.3)
PREALB SERPL-MCNC: 18 MG/DL — SIGNIFICANT CHANGE UP (ref 18–38)
PROT SERPL-MCNC: 5.7 G/DL — LOW (ref 6.6–8.7)
RBC # BLD: 2.5 M/UL — LOW (ref 3.8–5.2)
RBC # BLD: 2.74 M/UL — LOW (ref 3.8–5.2)
RBC # BLD: 3.47 M/UL — LOW (ref 3.8–5.2)
RBC # FLD: 13.5 % — SIGNIFICANT CHANGE UP (ref 10.3–14.5)
RBC # FLD: 13.5 % — SIGNIFICANT CHANGE UP (ref 10.3–14.5)
RBC # FLD: 14.3 % — SIGNIFICANT CHANGE UP (ref 10.3–14.5)
RBC BLD AUTO: ABNORMAL
SARS-COV-2 RNA SPEC QL NAA+PROBE: SIGNIFICANT CHANGE UP
SODIUM SERPL-SCNC: 140 MMOL/L — SIGNIFICANT CHANGE UP (ref 135–145)
TROPONIN T SERPL-MCNC: 2.57 NG/ML — HIGH (ref 0–0.06)
TROPONIN T SERPL-MCNC: 4.96 NG/ML — HIGH (ref 0–0.06)
WBC # BLD: 10.81 K/UL — HIGH (ref 3.8–10.5)
WBC # BLD: 10.91 K/UL — HIGH (ref 3.8–10.5)
WBC # BLD: 11.46 K/UL — HIGH (ref 3.8–10.5)
WBC # FLD AUTO: 10.81 K/UL — HIGH (ref 3.8–10.5)
WBC # FLD AUTO: 10.91 K/UL — HIGH (ref 3.8–10.5)
WBC # FLD AUTO: 11.46 K/UL — HIGH (ref 3.8–10.5)

## 2022-11-26 PROCEDURE — 71045 X-RAY EXAM CHEST 1 VIEW: CPT | Mod: 26

## 2022-11-26 PROCEDURE — 36620 INSERTION CATHETER ARTERY: CPT

## 2022-11-26 PROCEDURE — 99291 CRITICAL CARE FIRST HOUR: CPT | Mod: FS,25

## 2022-11-26 PROCEDURE — 93010 ELECTROCARDIOGRAM REPORT: CPT

## 2022-11-26 PROCEDURE — 99233 SBSQ HOSP IP/OBS HIGH 50: CPT | Mod: 25

## 2022-11-26 RX ORDER — ONDANSETRON 8 MG/1
4 TABLET, FILM COATED ORAL ONCE
Refills: 0 | Status: COMPLETED | OUTPATIENT
Start: 2022-11-26 | End: 2022-11-26

## 2022-11-26 RX ORDER — MAGNESIUM SULFATE 500 MG/ML
2 VIAL (ML) INJECTION ONCE
Refills: 0 | Status: COMPLETED | OUTPATIENT
Start: 2022-11-26 | End: 2022-11-26

## 2022-11-26 RX ORDER — HYDRALAZINE HCL 50 MG
10 TABLET ORAL ONCE
Refills: 0 | Status: COMPLETED | OUTPATIENT
Start: 2022-11-26 | End: 2022-11-26

## 2022-11-26 RX ORDER — CHLORHEXIDINE GLUCONATE 213 G/1000ML
1 SOLUTION TOPICAL ONCE
Refills: 0 | Status: COMPLETED | OUTPATIENT
Start: 2022-11-26 | End: 2022-11-26

## 2022-11-26 RX ORDER — CEFUROXIME AXETIL 250 MG
1500 TABLET ORAL ONCE
Refills: 0 | Status: DISCONTINUED | OUTPATIENT
Start: 2022-11-26 | End: 2022-11-27

## 2022-11-26 RX ORDER — VANCOMYCIN HCL 1 G
1000 VIAL (EA) INTRAVENOUS ONCE
Refills: 0 | Status: DISCONTINUED | OUTPATIENT
Start: 2022-11-26 | End: 2022-11-27

## 2022-11-26 RX ORDER — PANTOPRAZOLE SODIUM 20 MG/1
40 TABLET, DELAYED RELEASE ORAL ONCE
Refills: 0 | Status: COMPLETED | OUTPATIENT
Start: 2022-11-26 | End: 2022-11-26

## 2022-11-26 RX ADMIN — CHLORHEXIDINE GLUCONATE 1 APPLICATION(S): 213 SOLUTION TOPICAL at 21:09

## 2022-11-26 RX ADMIN — Medication 10 MILLIGRAM(S): at 07:41

## 2022-11-26 RX ADMIN — Medication 18 MICROGRAM(S)/MIN: at 21:14

## 2022-11-26 RX ADMIN — Medication 81 MILLIGRAM(S): at 13:33

## 2022-11-26 RX ADMIN — CHLORHEXIDINE GLUCONATE 1 APPLICATION(S): 213 SOLUTION TOPICAL at 06:31

## 2022-11-26 RX ADMIN — Medication 112 MICROGRAM(S): at 08:02

## 2022-11-26 RX ADMIN — PANTOPRAZOLE SODIUM 40 MILLIGRAM(S): 20 TABLET, DELAYED RELEASE ORAL at 13:33

## 2022-11-26 RX ADMIN — ONDANSETRON 4 MILLIGRAM(S): 8 TABLET, FILM COATED ORAL at 06:00

## 2022-11-26 RX ADMIN — CHLORHEXIDINE GLUCONATE 15 MILLILITER(S): 213 SOLUTION TOPICAL at 06:32

## 2022-11-26 RX ADMIN — MUPIROCIN 1 APPLICATION(S): 20 OINTMENT TOPICAL at 06:32

## 2022-11-26 RX ADMIN — Medication 25 GRAM(S): at 07:41

## 2022-11-26 RX ADMIN — SERTRALINE 100 MILLIGRAM(S): 25 TABLET, FILM COATED ORAL at 13:33

## 2022-11-26 RX ADMIN — MUPIROCIN 1 APPLICATION(S): 20 OINTMENT TOPICAL at 18:44

## 2022-11-26 RX ADMIN — ATORVASTATIN CALCIUM 80 MILLIGRAM(S): 80 TABLET, FILM COATED ORAL at 21:10

## 2022-11-26 RX ADMIN — CHLORHEXIDINE GLUCONATE 15 MILLILITER(S): 213 SOLUTION TOPICAL at 18:45

## 2022-11-26 RX ADMIN — SENNA PLUS 2 TABLET(S): 8.6 TABLET ORAL at 21:10

## 2022-11-26 NOTE — PROGRESS NOTE ADULT - SUBJECTIVE AND OBJECTIVE BOX
Department of Cardiology                                                                  [facility]/[place] [address] [address]                                                            Telephone: [phone]. Fax:[phone]                                                                                IABP Follow Up Note    82y Female    Date of Insertion: 2022    IABP Details:        Trigger: EKG       Timin:1       Augmentation: Max        Augmented Diastolic Pressure: 146       Augmentation Alarm: 90    ROS:   CV: No chest pain  Resp: No SOB  Abdomen: No abdominal/back/groin pain.    Physical Examination:   ICU Vital Signs Last 24 Hrs  T(C): 36.6 (2022 07:15), Max: 37.2 (2022 00:00)  T(F): 97.8 (2022 07:15), Max: 99 (2022 00:00)  HR: 86 (2022 11:00) (58 - 115)  BP: 158/117 (2022 11:00) (59/38 - 196/87)  BP(mean): 130 (2022 11:00) (46 - 131)  ABP: 165/66 (2022 11:00) (129/38 - 194/61)  ABP(mean): 104 (2022 11:00) (91 - 134)  RR: 11 (2022 11:00) (7 - 25)  SpO2: 100% (2022 11:00) (90% - 100%)    O2 Parameters below as of 2022 11:00  Patient On (Oxygen Delivery Method): nasal cannula  O2 Flow (L/min): 2    General: Awake, alert, no acute distress.  Chest: CTA, no murmur  Insertion Site: Left groin soft, no bleeding, no hematoma.  Right Groin: No bleeding/hematoma, ecchymotic, tender.  Extremity: Distal Pulses: +, Distal Extremity: Warm, good color.    Echo:   Left Ventricle: The left ventricular internal cavity size is normal. There is moderate asymmetric left ventricular hypertrophy involving the septal wall. The LVH involves septal walls. Global LV systolic function was mildly decreased. Left ventricular ejection fraction, by visual estimation, is 35 to 40%. Mildly decreased segmental left ventricular systolic function.  LV Wall Scoring: The entire apex is akinetic. The basal and mid anterior septum, basal and mid inferior septum, basal and mid inferior wall, and mid anterior segment are hypokinetic.  Right Atrium: The right atrium is normal in size.  Mitral Valve: Mild thickening of the anterior and posterior mitral valve leaflets. There is moderate mitral annular calcification. No evidence of mitral stenosis. Severe mitral valve regurgitation is seen.  Aortic Valve: No evidence of aortic stenosis. Mild aortic valve regurgitation is seen.    Labs:                           7.7    10.91 )-----------( 151      ( 2022 05:00 )             22.7         140  |  104  |  38.7  ----------------------------<  109  4.0   |  25.0  |  1.00    Ca    8.8      2022 02:30  Phos  3.5       Mg     1.9         TPro  5.7  /  Alb  3.3  /  TBili  0.4  /  DBili  x   /  AST  117  /  ALT  22  /  AlkPhos  66      CARDIAC MARKERS ( 2022 08:35 ): 2.57 ng/mL   CARDIAC MARKERS ( 2022 02:30 ): 4.96 ng/mL    CARDIAC MARKERS ( 2022 20:30 ): 4.19 ng/mL      CARDIAC MARKERS ( 2022 15:30 ): 1.52 ng/mL          PTT - ( 2022 05:00 )  PTT:26.0 sec    Assessment/Plan   1. Multivessel CAD  Continue bedrest with left leg straight.  Neurovascular checks every 2 hours.  Continue above settings.  Plan for removal: CABG tomorrow, removal after.

## 2022-11-26 NOTE — PROGRESS NOTE ADULT - SUBJECTIVE AND OBJECTIVE BOX
Patient seen and examined.  Denies CP, SOB, N/V.    T(C): 37.2 (11-26-22 @ 00:00)  T(F): 99 (11-26-22 @ 00:00)  HR: 68 (11-26-22 @ 00:15)  BP: 141/95 (11-26-22 @ 00:00)  BP(mean): 112 (11-26-22 @ 00:00)  ABP: 132/39 (11-25-22 @ 14:27)  ABP(mean): 100 (11-25-22 @ 14:27)  RR: 17 (11-26-22 @ 00:15)  SpO2: 100% (11-26-22 @ 00:15)  Wt(kg): --  CVP(mm Hg): 359 (11-25-22 @ 14:27)  CI: --  PA: 53/19 (11-25-22 @ 14:27)  PA(mean): 33 (11-25-22 @ 14:27)  PA(direct): --  SVRI: --      Physical Exam:  Gen: A&Ox3  Pulm:  CTA b/l, no r/r/w  CV:  S1S2, RRR, systolic murmur IV/VI  Abd: +BS, soft, NT, ND  Ext:  no c/c/e IABP L groin no hematoma dopplerable PT b/l.   Incision:  none    I&O's Detail    24 Nov 2022 07:01  -  25 Nov 2022 07:00  --------------------------------------------------------  IN:    Heparin Infusion: 125.5 mL  Total IN: 125.5 mL    OUT:    Indwelling Catheter - Urethral (mL): 780 mL  Total OUT: 780 mL    Total NET: -654.5 mL      25 Nov 2022 07:01  -  26 Nov 2022 01:53  --------------------------------------------------------  IN:    Heparin: 12 mL    Heparin Infusion: 16.5 mL    Heparin Infusion: 4.5 mL    Nitroglycerin: 88.5 mL    Phenylephrine: 7.3 mL  Total IN: 128.8 mL    OUT:    Indwelling Catheter - Urethral (mL): 850 mL  Total OUT: 850 mL    Total NET: -721.2 mL                              10.3   21.25 )-----------( Clumped    ( 25 Nov 2022 15:30 )             32.3   11-25    134<L>  |  104  |  40.6<H>  ----------------------------<  137<H>  5.2   |  17.0<L>  |  1.12    Ca    8.0<L>      25 Nov 2022 15:30  Phos  3.5     11-25  Mg     1.9     11-25    TPro  5.5<L>  /  Alb  3.0<L>  /  TBili  0.3<L>  /  DBili  x   /  AST  60<H>  /  ALT  17  /  AlkPhos  60  11-25  aPTT: 25.5 sec; PT: x    ; INR: x      11-26-22 @ 00:30         CAPILLARY BLOOD GLUCOSE            Medications:  aspirin enteric coated 81 milliGRAM(s) Oral daily  atorvastatin 80 milliGRAM(s) Oral at bedtime  cefuroxime  IVPB 1500 milliGRAM(s) IV Intermittent once  chlorhexidine 0.12% Liquid 15 milliLiter(s) Swish and Spit two times a day  chlorhexidine 4% Liquid 1 Application(s) Topical two times a day  heparin  Infusion 300 Unit(s)/Hr IV Continuous <Continuous>  levothyroxine 112 MICROGram(s) Oral daily  mupirocin 2% Nasal 1 Application(s) Both Nostrils two times a day  nitroglycerin  Infusion 60 MICROgram(s)/Min IV Continuous <Continuous>  pantoprazole  Injectable 40 milliGRAM(s) IV Push daily  phenylephrine    Infusion 0.3 MICROgram(s)/kG/Min IV Continuous <Continuous>  senna 2 Tablet(s) Oral at bedtime  sertraline 100 milliGRAM(s) Oral daily  sodium chloride 0.9% Bolus 1000 milliLiter(s) IV Bolus once  vancomycin  IVPB 1000 milliGRAM(s) IV Intermittent once      CXR: pending       Patient seen and examined.  Denies CP, SOB, N/V.    T(C): 37.2 (11-26-22 @ 00:00)  T(F): 99 (11-26-22 @ 00:00)  HR: 68 (11-26-22 @ 00:15)  BP: 141/95 (11-26-22 @ 00:00)  BP(mean): 112 (11-26-22 @ 00:00)  ABP: 132/39 (11-25-22 @ 14:27)  ABP(mean): 100 (11-25-22 @ 14:27)  RR: 17 (11-26-22 @ 00:15)  SpO2: 100% (11-26-22 @ 00:15)  Wt(kg): --  CVP(mm Hg): 359 (11-25-22 @ 14:27)  CI: --  PA: 53/19 (11-25-22 @ 14:27)  PA(mean): 33 (11-25-22 @ 14:27)  PA(direct): --  SVRI: --      Physical Exam:  Gen: A&Ox3  Pulm:  CTA b/l, no r/r/w  CV:  S1S2, RRR, systolic murmur IV/VI  Abd: +BS, soft, NT, ND  Ext:  no c/c/e IABP L groin no hematoma dopplerable PT b/l.   Incision:  none    I&O's Detail    24 Nov 2022 07:01  -  25 Nov 2022 07:00  --------------------------------------------------------  IN:    Heparin Infusion: 125.5 mL  Total IN: 125.5 mL    OUT:    Indwelling Catheter - Urethral (mL): 780 mL  Total OUT: 780 mL    Total NET: -654.5 mL      25 Nov 2022 07:01  -  26 Nov 2022 01:53  --------------------------------------------------------  IN:    Heparin: 12 mL    Heparin Infusion: 16.5 mL    Heparin Infusion: 4.5 mL    Nitroglycerin: 88.5 mL    Phenylephrine: 7.3 mL  Total IN: 128.8 mL    OUT:    Indwelling Catheter - Urethral (mL): 850 mL  Total OUT: 850 mL    Total NET: -721.2 mL                              10.3   21.25 )-----------( Clumped    ( 25 Nov 2022 15:30 )             32.3   11-25    134<L>  |  104  |  40.6<H>  ----------------------------<  137<H>  5.2   |  17.0<L>  |  1.12    Ca    8.0<L>      25 Nov 2022 15:30  Phos  3.5     11-25  Mg     1.9     11-25    TPro  5.5<L>  /  Alb  3.0<L>  /  TBili  0.3<L>  /  DBili  x   /  AST  60<H>  /  ALT  17  /  AlkPhos  60  11-25  aPTT: 25.5 sec; PT: x    ; INR: x      11-26-22 @ 00:30         CAPILLARY BLOOD GLUCOSE            Medications:  aspirin enteric coated 81 milliGRAM(s) Oral daily  atorvastatin 80 milliGRAM(s) Oral at bedtime  cefuroxime  IVPB 1500 milliGRAM(s) IV Intermittent once  chlorhexidine 0.12% Liquid 15 milliLiter(s) Swish and Spit two times a day  chlorhexidine 4% Liquid 1 Application(s) Topical two times a day  heparin  Infusion 300 Unit(s)/Hr IV Continuous <Continuous>  levothyroxine 112 MICROGram(s) Oral daily  mupirocin 2% Nasal 1 Application(s) Both Nostrils two times a day  nitroglycerin  Infusion 60 MICROgram(s)/Min IV Continuous <Continuous>  pantoprazole  Injectable 40 milliGRAM(s) IV Push daily  phenylephrine    Infusion 0.3 MICROgram(s)/kG/Min IV Continuous <Continuous>  senna 2 Tablet(s) Oral at bedtime  sertraline 100 milliGRAM(s) Oral daily  sodium chloride 0.9% Bolus 1000 milliLiter(s) IV Bolus once  vancomycin  IVPB 1000 milliGRAM(s) IV Intermittent once      CXR: pending      33 minutes noncontinuous critical care time spent caring for pt

## 2022-11-27 ENCOUNTER — RESULT REVIEW (OUTPATIENT)
Age: 82
End: 2022-11-27

## 2022-11-27 ENCOUNTER — TRANSCRIPTION ENCOUNTER (OUTPATIENT)
Age: 82
End: 2022-11-27

## 2022-11-27 ENCOUNTER — APPOINTMENT (OUTPATIENT)
Dept: CARDIOTHORACIC SURGERY | Facility: HOSPITAL | Age: 82
End: 2022-11-27

## 2022-11-27 LAB
ACANTHOCYTES BLD QL SMEAR: SIGNIFICANT CHANGE UP
ANION GAP SERPL CALC-SCNC: 10 MMOL/L — SIGNIFICANT CHANGE UP (ref 5–17)
ANION GAP SERPL CALC-SCNC: 16 MMOL/L — SIGNIFICANT CHANGE UP (ref 5–17)
ANISOCYTOSIS BLD QL: SLIGHT — SIGNIFICANT CHANGE UP
APTT BLD: 25.9 SEC — LOW (ref 27.5–35.5)
APTT BLD: 33 SEC — SIGNIFICANT CHANGE UP (ref 27.5–35.5)
BASE EXCESS BLDA CALC-SCNC: -1.3 MMOL/L — SIGNIFICANT CHANGE UP (ref -2–3)
BASE EXCESS BLDA CALC-SCNC: -3 MMOL/L — LOW (ref -2–3)
BASE EXCESS BLDA CALC-SCNC: -4.3 MMOL/L — LOW (ref -2–3)
BASE EXCESS BLDA CALC-SCNC: 0.1 MMOL/L — SIGNIFICANT CHANGE UP (ref -2–3)
BASE EXCESS BLDA CALC-SCNC: 0.4 MMOL/L — SIGNIFICANT CHANGE UP (ref -2–3)
BASE EXCESS BLDA CALC-SCNC: 1.7 MMOL/L — SIGNIFICANT CHANGE UP (ref -2–3)
BASE EXCESS BLDA CALC-SCNC: 1.8 MMOL/L — SIGNIFICANT CHANGE UP (ref -2–3)
BASE EXCESS BLDA CALC-SCNC: 2.5 MMOL/L — SIGNIFICANT CHANGE UP (ref -2–3)
BASE EXCESS BLDA CALC-SCNC: 4.8 MMOL/L — HIGH (ref -2–3)
BASE EXCESS BLDA CALC-SCNC: 5.7 MMOL/L — HIGH (ref -2–3)
BASE EXCESS BLDV CALC-SCNC: -3.9 MMOL/L — LOW (ref -2–3)
BASE EXCESS BLDV CALC-SCNC: 0.3 MMOL/L — SIGNIFICANT CHANGE UP (ref -2–3)
BASE EXCESS BLDV CALC-SCNC: 0.3 MMOL/L — SIGNIFICANT CHANGE UP (ref -2–3)
BASE EXCESS BLDV CALC-SCNC: 1 MMOL/L — SIGNIFICANT CHANGE UP (ref -2–3)
BASE EXCESS BLDV CALC-SCNC: 1.2 MMOL/L — SIGNIFICANT CHANGE UP (ref -2–3)
BASE EXCESS BLDV CALC-SCNC: 1.8 MMOL/L — SIGNIFICANT CHANGE UP (ref -2–3)
BASE EXCESS BLDV CALC-SCNC: 2.4 MMOL/L — SIGNIFICANT CHANGE UP (ref -2–3)
BASE EXCESS BLDV CALC-SCNC: 2.4 MMOL/L — SIGNIFICANT CHANGE UP (ref -2–3)
BASE EXCESS BLDV CALC-SCNC: 4.4 MMOL/L — HIGH (ref -2–3)
BASOPHILS # BLD AUTO: 0 K/UL — SIGNIFICANT CHANGE UP (ref 0–0.2)
BASOPHILS NFR BLD AUTO: 0 % — SIGNIFICANT CHANGE UP (ref 0–2)
BLOOD GAS COMMENTS, VENOUS: SIGNIFICANT CHANGE UP
BUN SERPL-MCNC: 19.4 MG/DL — SIGNIFICANT CHANGE UP (ref 8–20)
BUN SERPL-MCNC: 23.4 MG/DL — HIGH (ref 8–20)
CA-I BLDA-SCNC: 0.92 MMOL/L — LOW (ref 1.15–1.33)
CA-I BLDA-SCNC: 0.92 MMOL/L — LOW (ref 1.15–1.33)
CA-I BLDA-SCNC: 0.95 MMOL/L — LOW (ref 1.15–1.33)
CA-I BLDA-SCNC: 0.95 MMOL/L — LOW (ref 1.15–1.33)
CA-I BLDA-SCNC: 0.96 MMOL/L — LOW (ref 1.15–1.33)
CA-I BLDA-SCNC: 1.07 MMOL/L — LOW (ref 1.15–1.33)
CA-I BLDA-SCNC: 1.11 MMOL/L — LOW (ref 1.15–1.33)
CA-I BLDA-SCNC: 1.14 MMOL/L — LOW (ref 1.15–1.33)
CA-I BLDA-SCNC: 1.15 MMOL/L — SIGNIFICANT CHANGE UP (ref 1.15–1.33)
CA-I BLDA-SCNC: 1.18 MMOL/L — SIGNIFICANT CHANGE UP (ref 1.15–1.33)
CA-I SERPL-SCNC: 0.94 MMOL/L — LOW (ref 1.15–1.33)
CA-I SERPL-SCNC: 0.94 MMOL/L — LOW (ref 1.15–1.33)
CA-I SERPL-SCNC: 0.96 MMOL/L — LOW (ref 1.15–1.33)
CA-I SERPL-SCNC: 0.98 MMOL/L — LOW (ref 1.15–1.33)
CA-I SERPL-SCNC: 1.03 MMOL/L — LOW (ref 1.15–1.33)
CA-I SERPL-SCNC: 1.04 MMOL/L — LOW (ref 1.15–1.33)
CA-I SERPL-SCNC: 1.09 MMOL/L — LOW (ref 1.15–1.33)
CA-I SERPL-SCNC: 1.12 MMOL/L — LOW (ref 1.15–1.33)
CALCIUM SERPL-MCNC: 7.7 MG/DL — LOW (ref 8.4–10.5)
CALCIUM SERPL-MCNC: 8.4 MG/DL — SIGNIFICANT CHANGE UP (ref 8.4–10.5)
CHLORIDE BLDA-SCNC: 101 MMOL/L — SIGNIFICANT CHANGE UP (ref 96–108)
CHLORIDE BLDA-SCNC: 103 MMOL/L — SIGNIFICANT CHANGE UP (ref 96–108)
CHLORIDE BLDA-SCNC: 104 MMOL/L — SIGNIFICANT CHANGE UP (ref 96–108)
CHLORIDE BLDA-SCNC: 105 MMOL/L — SIGNIFICANT CHANGE UP (ref 96–108)
CHLORIDE BLDA-SCNC: 99 MMOL/L — SIGNIFICANT CHANGE UP (ref 96–108)
CHLORIDE BLDV-SCNC: 101 MMOL/L — SIGNIFICANT CHANGE UP (ref 96–108)
CHLORIDE BLDV-SCNC: 102 MMOL/L — SIGNIFICANT CHANGE UP (ref 96–108)
CHLORIDE BLDV-SCNC: 102 MMOL/L — SIGNIFICANT CHANGE UP (ref 96–108)
CHLORIDE BLDV-SCNC: 104 MMOL/L — SIGNIFICANT CHANGE UP (ref 96–108)
CHLORIDE BLDV-SCNC: 107 MMOL/L — SIGNIFICANT CHANGE UP (ref 96–108)
CHLORIDE BLDV-SCNC: 109 MMOL/L — HIGH (ref 96–108)
CHLORIDE BLDV-SCNC: 110 MMOL/L — HIGH (ref 96–108)
CHLORIDE BLDV-SCNC: 98 MMOL/L — SIGNIFICANT CHANGE UP (ref 96–108)
CHLORIDE SERPL-SCNC: 102 MMOL/L — SIGNIFICANT CHANGE UP (ref 96–108)
CHLORIDE SERPL-SCNC: 102 MMOL/L — SIGNIFICANT CHANGE UP (ref 96–108)
CK MB CFR SERPL CALC: 27.6 NG/ML — HIGH (ref 0–6.7)
CK SERPL-CCNC: 373 U/L — HIGH (ref 25–170)
CO2 SERPL-SCNC: 23 MMOL/L — SIGNIFICANT CHANGE UP (ref 22–29)
CO2 SERPL-SCNC: 25 MMOL/L — SIGNIFICANT CHANGE UP (ref 22–29)
COHGB MFR BLDA: 0.6 % — SIGNIFICANT CHANGE UP
COHGB MFR BLDA: 0.7 % — SIGNIFICANT CHANGE UP
COHGB MFR BLDA: 0.8 % — SIGNIFICANT CHANGE UP
COHGB MFR BLDA: 0.9 % — SIGNIFICANT CHANGE UP
COHGB MFR BLDA: 0.9 % — SIGNIFICANT CHANGE UP
COHGB MFR BLDA: 1.1 % — SIGNIFICANT CHANGE UP
COHGB MFR BLDA: 1.2 % — SIGNIFICANT CHANGE UP
COHGB MFR BLDA: 1.3 % — SIGNIFICANT CHANGE UP
COHGB MFR BLDV: 1 % — SIGNIFICANT CHANGE UP
COHGB MFR BLDV: 1.4 % — SIGNIFICANT CHANGE UP
COHGB MFR BLDV: 1.6 % — SIGNIFICANT CHANGE UP
COHGB MFR BLDV: 1.7 % — SIGNIFICANT CHANGE UP
COHGB MFR BLDV: 1.7 % — SIGNIFICANT CHANGE UP
CREAT SERPL-MCNC: 0.55 MG/DL — SIGNIFICANT CHANGE UP (ref 0.5–1.3)
CREAT SERPL-MCNC: 0.85 MG/DL — SIGNIFICANT CHANGE UP (ref 0.5–1.3)
EGFR: 68 ML/MIN/1.73M2 — SIGNIFICANT CHANGE UP
EGFR: 91 ML/MIN/1.73M2 — SIGNIFICANT CHANGE UP
EOSINOPHIL # BLD AUTO: 0.16 K/UL — SIGNIFICANT CHANGE UP (ref 0–0.5)
EOSINOPHIL NFR BLD AUTO: 0.9 % — SIGNIFICANT CHANGE UP (ref 0–6)
GAS PNL BLDA: SIGNIFICANT CHANGE UP
GAS PNL BLDV: 130 MMOL/L — LOW (ref 136–145)
GAS PNL BLDV: 132 MMOL/L — LOW (ref 136–145)
GAS PNL BLDV: 133 MMOL/L — LOW (ref 136–145)
GAS PNL BLDV: 134 MMOL/L — LOW (ref 136–145)
GAS PNL BLDV: 135 MMOL/L — LOW (ref 136–145)
GAS PNL BLDV: 136 MMOL/L — SIGNIFICANT CHANGE UP (ref 136–145)
GAS PNL BLDV: 138 MMOL/L — SIGNIFICANT CHANGE UP (ref 136–145)
GAS PNL BLDV: 139 MMOL/L — SIGNIFICANT CHANGE UP (ref 136–145)
GAS PNL BLDV: SIGNIFICANT CHANGE UP
GIANT PLATELETS BLD QL SMEAR: PRESENT — SIGNIFICANT CHANGE UP
GLUCOSE BLDA-MCNC: 109 MG/DL — HIGH (ref 70–99)
GLUCOSE BLDA-MCNC: 118 MG/DL — HIGH (ref 70–99)
GLUCOSE BLDA-MCNC: 118 MG/DL — HIGH (ref 70–99)
GLUCOSE BLDA-MCNC: 120 MG/DL — HIGH (ref 70–99)
GLUCOSE BLDA-MCNC: 125 MG/DL — HIGH (ref 70–99)
GLUCOSE BLDA-MCNC: 136 MG/DL — HIGH (ref 70–99)
GLUCOSE BLDA-MCNC: 152 MG/DL — HIGH (ref 70–99)
GLUCOSE BLDA-MCNC: 163 MG/DL — HIGH (ref 70–99)
GLUCOSE BLDA-MCNC: 181 MG/DL — HIGH (ref 70–99)
GLUCOSE BLDA-MCNC: 185 MG/DL — HIGH (ref 70–99)
GLUCOSE BLDC GLUCOMTR-MCNC: 138 MG/DL — HIGH (ref 70–99)
GLUCOSE BLDC GLUCOMTR-MCNC: 158 MG/DL — HIGH (ref 70–99)
GLUCOSE BLDC GLUCOMTR-MCNC: 176 MG/DL — HIGH (ref 70–99)
GLUCOSE BLDC GLUCOMTR-MCNC: 176 MG/DL — HIGH (ref 70–99)
GLUCOSE BLDC GLUCOMTR-MCNC: 199 MG/DL — HIGH (ref 70–99)
GLUCOSE BLDC GLUCOMTR-MCNC: 213 MG/DL — HIGH (ref 70–99)
GLUCOSE BLDV-MCNC: 109 MG/DL — HIGH (ref 70–99)
GLUCOSE BLDV-MCNC: 112 MG/DL — HIGH (ref 70–99)
GLUCOSE BLDV-MCNC: 114 MG/DL — HIGH (ref 70–99)
GLUCOSE BLDV-MCNC: 136 MG/DL — HIGH (ref 70–99)
GLUCOSE BLDV-MCNC: 147 MG/DL — HIGH (ref 70–99)
GLUCOSE BLDV-MCNC: 165 MG/DL — HIGH (ref 70–99)
GLUCOSE BLDV-MCNC: 169 MG/DL — HIGH (ref 70–99)
GLUCOSE BLDV-MCNC: 192 MG/DL — HIGH (ref 70–99)
GLUCOSE SERPL-MCNC: 108 MG/DL — HIGH (ref 70–99)
GLUCOSE SERPL-MCNC: 203 MG/DL — HIGH (ref 70–99)
HCO3 BLDA-SCNC: 20 MMOL/L — LOW (ref 21–28)
HCO3 BLDA-SCNC: 22 MMOL/L — SIGNIFICANT CHANGE UP (ref 21–28)
HCO3 BLDA-SCNC: 24 MMOL/L — SIGNIFICANT CHANGE UP (ref 21–28)
HCO3 BLDA-SCNC: 25 MMOL/L — SIGNIFICANT CHANGE UP (ref 21–28)
HCO3 BLDA-SCNC: 25 MMOL/L — SIGNIFICANT CHANGE UP (ref 21–28)
HCO3 BLDA-SCNC: 26 MMOL/L — SIGNIFICANT CHANGE UP (ref 21–28)
HCO3 BLDA-SCNC: 26 MMOL/L — SIGNIFICANT CHANGE UP (ref 21–28)
HCO3 BLDA-SCNC: 28 MMOL/L — SIGNIFICANT CHANGE UP (ref 21–28)
HCO3 BLDV-SCNC: 22 MMOL/L — SIGNIFICANT CHANGE UP (ref 22–29)
HCO3 BLDV-SCNC: 24 MMOL/L — SIGNIFICANT CHANGE UP (ref 22–29)
HCO3 BLDV-SCNC: 25 MMOL/L — SIGNIFICANT CHANGE UP (ref 22–29)
HCO3 BLDV-SCNC: 26 MMOL/L — SIGNIFICANT CHANGE UP (ref 22–29)
HCT VFR BLD CALC: 28.7 % — LOW (ref 34.5–45)
HCT VFR BLD CALC: 29.4 % — LOW (ref 34.5–45)
HCT VFR BLDA CALC: 21 % — SIGNIFICANT CHANGE UP
HCT VFR BLDA CALC: 22 % — SIGNIFICANT CHANGE UP
HCT VFR BLDA CALC: 23 % — SIGNIFICANT CHANGE UP
HCT VFR BLDA CALC: 23 % — SIGNIFICANT CHANGE UP
HCT VFR BLDA CALC: 24 % — SIGNIFICANT CHANGE UP
HCT VFR BLDA CALC: 25 % — SIGNIFICANT CHANGE UP
HCT VFR BLDA CALC: 26 % — SIGNIFICANT CHANGE UP
HCT VFR BLDA CALC: 27 % — SIGNIFICANT CHANGE UP
HCT VFR BLDA CALC: 28 % — SIGNIFICANT CHANGE UP
HCT VFR BLDA CALC: 29 % — SIGNIFICANT CHANGE UP
HCT VFR BLDA CALC: 30 % — SIGNIFICANT CHANGE UP
HCT VFR BLDA CALC: 32 % — SIGNIFICANT CHANGE UP
HGB BLD CALC-MCNC: 7 G/DL — CRITICAL LOW (ref 11.7–16.1)
HGB BLD CALC-MCNC: 7.4 G/DL — LOW (ref 11.7–16.1)
HGB BLD CALC-MCNC: 7.8 G/DL — LOW (ref 11.7–16.1)
HGB BLD CALC-MCNC: 8 G/DL — LOW (ref 11.7–16.1)
HGB BLD CALC-MCNC: 8.3 G/DL — LOW (ref 11.7–16.1)
HGB BLD CALC-MCNC: 8.4 G/DL — LOW (ref 11.7–16.1)
HGB BLD CALC-MCNC: 9.1 G/DL — LOW (ref 11.7–16.1)
HGB BLD CALC-MCNC: 9.5 G/DL — LOW (ref 11.7–16.1)
HGB BLD-MCNC: 10.1 G/DL — LOW (ref 11.5–15.5)
HGB BLD-MCNC: 10.4 G/DL — LOW (ref 11.5–15.5)
HGB BLDA-MCNC: 10.5 G/DL — LOW (ref 11.7–16.1)
HGB BLDA-MCNC: 7.8 G/DL — LOW (ref 11.7–16.1)
HGB BLDA-MCNC: 8 G/DL — LOW (ref 11.7–16.1)
HGB BLDA-MCNC: 8.1 G/DL — LOW (ref 11.7–16.1)
HGB BLDA-MCNC: 8.2 G/DL — LOW (ref 11.7–16.1)
HGB BLDA-MCNC: 8.6 G/DL — LOW (ref 11.7–16.1)
HGB BLDA-MCNC: 9.2 G/DL — LOW (ref 11.7–16.1)
HGB BLDA-MCNC: 9.6 G/DL — LOW (ref 11.7–16.1)
HGB BLDA-MCNC: 9.6 G/DL — LOW (ref 11.7–16.1)
HGB BLDA-MCNC: 9.9 G/DL — LOW (ref 11.7–16.1)
HOROWITZ INDEX BLDV+IHG-RTO: 60 — SIGNIFICANT CHANGE UP
HOROWITZ INDEX BLDV+IHG-RTO: 80 — SIGNIFICANT CHANGE UP
INR BLD: 1.13 RATIO — SIGNIFICANT CHANGE UP (ref 0.88–1.16)
INR BLD: 1.74 RATIO — HIGH (ref 0.88–1.16)
LACTATE BLDA-MCNC: 0.9 MMOL/L — SIGNIFICANT CHANGE UP (ref 0.5–2)
LACTATE BLDA-MCNC: 1 MMOL/L — SIGNIFICANT CHANGE UP (ref 0.5–2)
LACTATE BLDA-MCNC: 1.2 MMOL/L — SIGNIFICANT CHANGE UP (ref 0.5–2)
LACTATE BLDA-MCNC: 1.3 MMOL/L — SIGNIFICANT CHANGE UP (ref 0.5–2)
LACTATE BLDA-MCNC: 1.5 MMOL/L — SIGNIFICANT CHANGE UP (ref 0.5–2)
LACTATE BLDA-MCNC: 1.6 MMOL/L — SIGNIFICANT CHANGE UP (ref 0.5–2)
LACTATE BLDA-MCNC: 3.4 MMOL/L — HIGH (ref 0.5–2)
LACTATE BLDA-MCNC: 3.4 MMOL/L — HIGH (ref 0.5–2)
LACTATE BLDV-MCNC: 1 MMOL/L — SIGNIFICANT CHANGE UP (ref 0.5–2)
LACTATE BLDV-MCNC: 1 MMOL/L — SIGNIFICANT CHANGE UP (ref 0.5–2)
LACTATE BLDV-MCNC: 1.2 MMOL/L — SIGNIFICANT CHANGE UP (ref 0.5–2)
LACTATE BLDV-MCNC: 1.3 MMOL/L — SIGNIFICANT CHANGE UP (ref 0.5–2)
LACTATE BLDV-MCNC: 1.6 MMOL/L — SIGNIFICANT CHANGE UP (ref 0.5–2)
LACTATE BLDV-MCNC: 1.7 MMOL/L — SIGNIFICANT CHANGE UP (ref 0.5–2)
LACTATE BLDV-MCNC: 2.2 MMOL/L — HIGH (ref 0.5–2)
LACTATE BLDV-MCNC: 2.3 MMOL/L — HIGH (ref 0.5–2)
LYMPHOCYTES # BLD AUTO: 0.78 K/UL — LOW (ref 1–3.3)
LYMPHOCYTES # BLD AUTO: 4.3 % — LOW (ref 13–44)
MAGNESIUM SERPL-MCNC: 1.9 MG/DL — SIGNIFICANT CHANGE UP (ref 1.6–2.6)
MANUAL SMEAR VERIFICATION: SIGNIFICANT CHANGE UP
MCHC RBC-ENTMCNC: 30.2 PG — SIGNIFICANT CHANGE UP (ref 27–34)
MCHC RBC-ENTMCNC: 30.3 PG — SIGNIFICANT CHANGE UP (ref 27–34)
MCHC RBC-ENTMCNC: 34.4 GM/DL — SIGNIFICANT CHANGE UP (ref 32–36)
MCHC RBC-ENTMCNC: 36.2 GM/DL — HIGH (ref 32–36)
MCV RBC AUTO: 83.4 FL — SIGNIFICANT CHANGE UP (ref 80–100)
MCV RBC AUTO: 88.3 FL — SIGNIFICANT CHANGE UP (ref 80–100)
METAMYELOCYTES # FLD: 0.9 % — HIGH (ref 0–0)
METHGB MFR BLDA: 0.1 % — SIGNIFICANT CHANGE UP
METHGB MFR BLDA: 0.4 % — SIGNIFICANT CHANGE UP
METHGB MFR BLDA: 0.6 % — SIGNIFICANT CHANGE UP
METHGB MFR BLDA: 0.6 % — SIGNIFICANT CHANGE UP
METHGB MFR BLDA: 0.7 % — SIGNIFICANT CHANGE UP
METHGB MFR BLDA: 0.8 % — SIGNIFICANT CHANGE UP
METHGB MFR BLDA: 1 % — SIGNIFICANT CHANGE UP
METHGB MFR BLDA: 1.3 % — SIGNIFICANT CHANGE UP
METHGB MFR BLDV: 0.4 % — SIGNIFICANT CHANGE UP
METHGB MFR BLDV: 0.7 % — SIGNIFICANT CHANGE UP
METHGB MFR BLDV: 0.8 % — SIGNIFICANT CHANGE UP
METHGB MFR BLDV: 1.2 % — SIGNIFICANT CHANGE UP
METHGB MFR BLDV: 1.6 % — HIGH
MICROCYTES BLD QL: SLIGHT — SIGNIFICANT CHANGE UP
MONOCYTES # BLD AUTO: 0.47 K/UL — SIGNIFICANT CHANGE UP (ref 0–0.9)
MONOCYTES NFR BLD AUTO: 2.6 % — SIGNIFICANT CHANGE UP (ref 2–14)
MYELOCYTES NFR BLD: 0.9 % — HIGH (ref 0–0)
NEUTROPHILS # BLD AUTO: 16.34 K/UL — HIGH (ref 1.8–7.4)
NEUTROPHILS NFR BLD AUTO: 81.7 % — HIGH (ref 43–77)
NEUTS BAND # BLD: 8.7 % — HIGH (ref 0–8)
OVALOCYTES BLD QL SMEAR: SLIGHT — SIGNIFICANT CHANGE UP
OXYHGB MFR BLDA: 98 % — HIGH (ref 90–95)
PCO2 BLDA: 24 MMHG — LOW (ref 32–45)
PCO2 BLDA: 26 MMHG — LOW (ref 32–45)
PCO2 BLDA: 31 MMHG — LOW (ref 32–45)
PCO2 BLDA: 32 MMHG — SIGNIFICANT CHANGE UP (ref 32–45)
PCO2 BLDA: 34 MMHG — SIGNIFICANT CHANGE UP (ref 32–45)
PCO2 BLDA: 34 MMHG — SIGNIFICANT CHANGE UP (ref 32–45)
PCO2 BLDA: 36 MMHG — SIGNIFICANT CHANGE UP (ref 32–45)
PCO2 BLDA: 38 MMHG — SIGNIFICANT CHANGE UP (ref 32–45)
PCO2 BLDV: 28 MMHG — LOW (ref 39–42)
PCO2 BLDV: 31 MMHG — LOW (ref 39–42)
PCO2 BLDV: 34 MMHG — LOW (ref 39–42)
PCO2 BLDV: 36 MMHG — LOW (ref 39–42)
PCO2 BLDV: 36 MMHG — LOW (ref 39–42)
PCO2 BLDV: 37 MMHG — LOW (ref 39–42)
PCO2 BLDV: 38 MMHG — LOW (ref 39–42)
PCO2 BLDV: 38 MMHG — LOW (ref 39–42)
PCO2 BLDV: 42 MMHG — SIGNIFICANT CHANGE UP (ref 39–42)
PH BLDA: 7.4 — SIGNIFICANT CHANGE UP (ref 7.35–7.45)
PH BLDA: 7.41 — SIGNIFICANT CHANGE UP (ref 7.35–7.45)
PH BLDA: 7.41 — SIGNIFICANT CHANGE UP (ref 7.35–7.45)
PH BLDA: 7.42 — SIGNIFICANT CHANGE UP (ref 7.35–7.45)
PH BLDA: 7.46 — HIGH (ref 7.35–7.45)
PH BLDA: 7.47 — HIGH (ref 7.35–7.45)
PH BLDA: 7.48 — HIGH (ref 7.35–7.45)
PH BLDA: 7.51 — HIGH (ref 7.35–7.45)
PH BLDA: 7.57 — HIGH (ref 7.35–7.45)
PH BLDA: 7.65 — CRITICAL HIGH (ref 7.35–7.45)
PH BLDV: 7.36 — SIGNIFICANT CHANGE UP (ref 7.32–7.43)
PH BLDV: 7.4 — SIGNIFICANT CHANGE UP (ref 7.32–7.43)
PH BLDV: 7.43 — SIGNIFICANT CHANGE UP (ref 7.32–7.43)
PH BLDV: 7.44 — HIGH (ref 7.32–7.43)
PH BLDV: 7.45 — HIGH (ref 7.32–7.43)
PH BLDV: 7.45 — HIGH (ref 7.32–7.43)
PH BLDV: 7.48 — HIGH (ref 7.32–7.43)
PH BLDV: 7.52 — HIGH (ref 7.32–7.43)
PH BLDV: 7.58 — HIGH (ref 7.32–7.43)
PLAT MORPH BLD: NORMAL — SIGNIFICANT CHANGE UP
PLATELET # BLD AUTO: 111 K/UL — LOW (ref 150–400)
PLATELET # BLD AUTO: 112 K/UL — LOW (ref 150–400)
PO2 BLDA: 255 MMHG — HIGH (ref 83–108)
PO2 BLDA: 277 MMHG — HIGH (ref 83–108)
PO2 BLDA: 391 MMHG — HIGH (ref 83–108)
PO2 BLDA: 481 MMHG — HIGH (ref 83–108)
PO2 BLDA: >496 MMHG — HIGH (ref 83–108)
PO2 BLDV: 55 MMHG — HIGH (ref 25–45)
PO2 BLDV: 58 MMHG — HIGH (ref 25–45)
PO2 BLDV: 68 MMHG — HIGH (ref 25–45)
PO2 BLDV: 71 MMHG — HIGH (ref 25–45)
PO2 BLDV: 92 MMHG — HIGH (ref 25–45)
PO2 BLDV: <42 MMHG — SIGNIFICANT CHANGE UP (ref 25–45)
POIKILOCYTOSIS BLD QL AUTO: SIGNIFICANT CHANGE UP
POLYCHROMASIA BLD QL SMEAR: SIGNIFICANT CHANGE UP
POTASSIUM BLDA-SCNC: 3.3 MMOL/L — LOW (ref 3.5–5.1)
POTASSIUM BLDA-SCNC: 3.3 MMOL/L — LOW (ref 3.5–5.1)
POTASSIUM BLDA-SCNC: 3.5 MMOL/L — SIGNIFICANT CHANGE UP (ref 3.5–5.1)
POTASSIUM BLDA-SCNC: 4 MMOL/L — SIGNIFICANT CHANGE UP (ref 3.5–5.1)
POTASSIUM BLDA-SCNC: 4.3 MMOL/L — SIGNIFICANT CHANGE UP (ref 3.5–5.1)
POTASSIUM BLDA-SCNC: 4.7 MMOL/L — SIGNIFICANT CHANGE UP (ref 3.5–5.1)
POTASSIUM BLDA-SCNC: 4.9 MMOL/L — SIGNIFICANT CHANGE UP (ref 3.5–5.1)
POTASSIUM BLDA-SCNC: 5.1 MMOL/L — SIGNIFICANT CHANGE UP (ref 3.5–5.1)
POTASSIUM BLDV-SCNC: 3.2 MMOL/L — LOW (ref 3.5–5.1)
POTASSIUM BLDV-SCNC: 3.5 MMOL/L — SIGNIFICANT CHANGE UP (ref 3.5–5.1)
POTASSIUM BLDV-SCNC: 3.8 MMOL/L — SIGNIFICANT CHANGE UP (ref 3.5–5.1)
POTASSIUM BLDV-SCNC: 4.6 MMOL/L — SIGNIFICANT CHANGE UP (ref 3.5–5.1)
POTASSIUM BLDV-SCNC: 4.7 MMOL/L — SIGNIFICANT CHANGE UP (ref 3.5–5.1)
POTASSIUM BLDV-SCNC: 4.9 MMOL/L — SIGNIFICANT CHANGE UP (ref 3.5–5.1)
POTASSIUM BLDV-SCNC: 4.9 MMOL/L — SIGNIFICANT CHANGE UP (ref 3.5–5.1)
POTASSIUM BLDV-SCNC: >9.9 MMOL/L — CRITICAL HIGH (ref 3.5–5.1)
POTASSIUM SERPL-MCNC: 3.6 MMOL/L — SIGNIFICANT CHANGE UP (ref 3.5–5.3)
POTASSIUM SERPL-MCNC: 4.3 MMOL/L — SIGNIFICANT CHANGE UP (ref 3.5–5.3)
POTASSIUM SERPL-SCNC: 3.6 MMOL/L — SIGNIFICANT CHANGE UP (ref 3.5–5.3)
POTASSIUM SERPL-SCNC: 4.3 MMOL/L — SIGNIFICANT CHANGE UP (ref 3.5–5.3)
PROTHROM AB SERPL-ACNC: 13.1 SEC — SIGNIFICANT CHANGE UP (ref 10.5–13.4)
PROTHROM AB SERPL-ACNC: 20.3 SEC — HIGH (ref 10.5–13.4)
RBC # BLD: 3.33 M/UL — LOW (ref 3.8–5.2)
RBC # BLD: 3.44 M/UL — LOW (ref 3.8–5.2)
RBC # FLD: 14.3 % — SIGNIFICANT CHANGE UP (ref 10.3–14.5)
RBC # FLD: 16.5 % — HIGH (ref 10.3–14.5)
RBC BLD AUTO: ABNORMAL
SAO2 % BLDA: 100 % — HIGH (ref 94–98)
SAO2 % BLDA: 99.1 % — HIGH (ref 94–98)
SAO2 % BLDA: 99.5 % — HIGH (ref 94–98)
SAO2 % BLDA: 99.6 % — HIGH (ref 94–98)
SAO2 % BLDA: 99.9 % — HIGH (ref 94–98)
SAO2 % BLDA: 99.9 % — HIGH (ref 94–98)
SAO2 % BLDV: 61.6 % — SIGNIFICANT CHANGE UP
SAO2 % BLDV: 61.9 % — SIGNIFICANT CHANGE UP
SAO2 % BLDV: 67.3 % — SIGNIFICANT CHANGE UP
SAO2 % BLDV: 70 % — SIGNIFICANT CHANGE UP
SAO2 % BLDV: 95.3 % — HIGH (ref 67–88)
SAO2 % BLDV: 97 % — HIGH (ref 67–88)
SAO2 % BLDV: 98.3 % — HIGH (ref 67–88)
SAO2 % BLDV: 98.6 % — HIGH (ref 67–88)
SAO2 % BLDV: 99.9 % — HIGH (ref 67–88)
SODIUM BLDA-SCNC: 129 MMOL/L — LOW (ref 136–145)
SODIUM BLDA-SCNC: 132 MMOL/L — LOW (ref 136–145)
SODIUM BLDA-SCNC: 132 MMOL/L — LOW (ref 136–145)
SODIUM BLDA-SCNC: 133 MMOL/L — LOW (ref 136–145)
SODIUM BLDA-SCNC: 134 MMOL/L — LOW (ref 136–145)
SODIUM BLDA-SCNC: 138 MMOL/L — SIGNIFICANT CHANGE UP (ref 136–145)
SODIUM SERPL-SCNC: 136 MMOL/L — SIGNIFICANT CHANGE UP (ref 135–145)
SODIUM SERPL-SCNC: 141 MMOL/L — SIGNIFICANT CHANGE UP (ref 135–145)
TROPONIN T SERPL-MCNC: 2.93 NG/ML — HIGH (ref 0–0.06)
WBC # BLD: 10.19 K/UL — SIGNIFICANT CHANGE UP (ref 3.8–10.5)
WBC # BLD: 18.07 K/UL — HIGH (ref 3.8–10.5)
WBC # FLD AUTO: 10.19 K/UL — SIGNIFICANT CHANGE UP (ref 3.8–10.5)
WBC # FLD AUTO: 18.07 K/UL — HIGH (ref 3.8–10.5)

## 2022-11-27 PROCEDURE — 33533 CABG ARTERIAL SINGLE: CPT

## 2022-11-27 PROCEDURE — 33430 REPLACEMENT OF MITRAL VALVE: CPT

## 2022-11-27 PROCEDURE — 93010 ELECTROCARDIOGRAM REPORT: CPT | Mod: 76

## 2022-11-27 PROCEDURE — 33508 ENDOSCOPIC VEIN HARVEST: CPT | Mod: 59

## 2022-11-27 PROCEDURE — 33430 REPLACEMENT OF MITRAL VALVE: CPT | Mod: AS

## 2022-11-27 PROCEDURE — 33518 CABG ARTERY-VEIN TWO: CPT

## 2022-11-27 PROCEDURE — 88305 TISSUE EXAM BY PATHOLOGIST: CPT | Mod: 26

## 2022-11-27 PROCEDURE — 33518 CABG ARTERY-VEIN TWO: CPT | Mod: AS

## 2022-11-27 PROCEDURE — 71045 X-RAY EXAM CHEST 1 VIEW: CPT | Mod: 26

## 2022-11-27 PROCEDURE — 33533 CABG ARTERIAL SINGLE: CPT | Mod: AS

## 2022-11-27 PROCEDURE — 71045 X-RAY EXAM CHEST 1 VIEW: CPT | Mod: 26,77

## 2022-11-27 DEVICE — CATH THERMODIL PACE 7.5FR: Type: IMPLANTABLE DEVICE | Status: FUNCTIONAL

## 2022-11-27 DEVICE — CANNULA AORTIC ROOT 12G X 14CM FLANGED: Type: IMPLANTABLE DEVICE | Status: FUNCTIONAL

## 2022-11-27 DEVICE — IMPLANTABLE DEVICE: Type: IMPLANTABLE DEVICE | Status: FUNCTIONAL

## 2022-11-27 DEVICE — MEDIASTINAL CATH DRAIN 9MM: Type: IMPLANTABLE DEVICE | Status: FUNCTIONAL

## 2022-11-27 DEVICE — COR-KNOT MINI DEVICE COMBO KIT: Type: IMPLANTABLE DEVICE | Status: FUNCTIONAL

## 2022-11-27 DEVICE — SURGICEL FIBRILLAR 4 X 4": Type: IMPLANTABLE DEVICE | Status: FUNCTIONAL

## 2022-11-27 DEVICE — CANNULA VENOUS 1 STAGE STRAIGHT 28FR X 3/8": Type: IMPLANTABLE DEVICE | Status: FUNCTIONAL

## 2022-11-27 DEVICE — OCCLUDER INTERNAL VESSEL FLO-RESTER 1 X 12MM: Type: IMPLANTABLE DEVICE | Status: FUNCTIONAL

## 2022-11-27 DEVICE — CANNULA ARTERIAL STRAIGHT 20FR X 3/8" VENTED: Type: IMPLANTABLE DEVICE | Status: FUNCTIONAL

## 2022-11-27 DEVICE — TISSEEL WITH DUPLOJECT SYSTEM 10ML: Type: IMPLANTABLE DEVICE | Status: FUNCTIONAL

## 2022-11-27 DEVICE — PACING WIRE WHITE M-25 WINGED WIRE 37MM X 89MM: Type: IMPLANTABLE DEVICE | Status: FUNCTIONAL

## 2022-11-27 DEVICE — KIT A-LINE 1LUM 20G X 12CM SAFE KIT: Type: IMPLANTABLE DEVICE | Status: FUNCTIONAL

## 2022-11-27 DEVICE — ATRICLIP LAA EXCLUSION DEVICE 45MM FLEX-V: Type: IMPLANTABLE DEVICE | Status: FUNCTIONAL

## 2022-11-27 DEVICE — KIT CVC 2LUM MAC 9FR CHG: Type: IMPLANTABLE DEVICE | Status: FUNCTIONAL

## 2022-11-27 DEVICE — LIGATING CLIPS AESCULAP SMALL WIDE 24: Type: IMPLANTABLE DEVICE | Status: FUNCTIONAL

## 2022-11-27 DEVICE — CATH INFUS SL 7FR 20CM: Type: IMPLANTABLE DEVICE | Status: FUNCTIONAL

## 2022-11-27 DEVICE — CANNULA VENOUS 1 STAGE RIGHT ANGLE METAL TIP 24FR X 3/8": Type: IMPLANTABLE DEVICE | Status: FUNCTIONAL

## 2022-11-27 DEVICE — PACING WIRE ORANGE M-25 WINGED WIRE 37MM X 89MM: Type: IMPLANTABLE DEVICE | Status: FUNCTIONAL

## 2022-11-27 RX ORDER — POTASSIUM CHLORIDE 20 MEQ
10 PACKET (EA) ORAL
Refills: 0 | Status: DISCONTINUED | OUTPATIENT
Start: 2022-11-27 | End: 2022-11-28

## 2022-11-27 RX ORDER — OXYCODONE HYDROCHLORIDE 5 MG/1
5 TABLET ORAL EVERY 6 HOURS
Refills: 0 | Status: DISCONTINUED | OUTPATIENT
Start: 2022-11-27 | End: 2022-12-02

## 2022-11-27 RX ORDER — PROPOFOL 10 MG/ML
12.74 INJECTION, EMULSION INTRAVENOUS
Qty: 1000 | Refills: 0 | Status: DISCONTINUED | OUTPATIENT
Start: 2022-11-27 | End: 2022-11-27

## 2022-11-27 RX ORDER — ACETAMINOPHEN 500 MG
975 TABLET ORAL EVERY 8 HOURS
Refills: 0 | Status: COMPLETED | OUTPATIENT
Start: 2022-11-28 | End: 2022-11-29

## 2022-11-27 RX ORDER — ACETAMINOPHEN 500 MG
1000 TABLET ORAL ONCE
Refills: 0 | Status: DISCONTINUED | OUTPATIENT
Start: 2022-11-27 | End: 2022-11-28

## 2022-11-27 RX ORDER — POTASSIUM CHLORIDE 20 MEQ
10 PACKET (EA) ORAL
Refills: 0 | Status: COMPLETED | OUTPATIENT
Start: 2022-11-27 | End: 2022-11-28

## 2022-11-27 RX ORDER — VANCOMYCIN HCL 1 G
1000 VIAL (EA) INTRAVENOUS EVERY 12 HOURS
Refills: 0 | Status: COMPLETED | OUTPATIENT
Start: 2022-11-27 | End: 2022-11-29

## 2022-11-27 RX ORDER — DOBUTAMINE HCL 250MG/20ML
5 VIAL (ML) INTRAVENOUS
Qty: 500 | Refills: 0 | Status: DISCONTINUED | OUTPATIENT
Start: 2022-11-27 | End: 2022-11-27

## 2022-11-27 RX ORDER — NOREPINEPHRINE BITARTRATE/D5W 8 MG/250ML
0.05 PLASTIC BAG, INJECTION (ML) INTRAVENOUS
Qty: 8 | Refills: 0 | Status: DISCONTINUED | OUTPATIENT
Start: 2022-11-27 | End: 2022-11-28

## 2022-11-27 RX ORDER — ASPIRIN/CALCIUM CARB/MAGNESIUM 324 MG
325 TABLET ORAL ONCE
Refills: 0 | Status: COMPLETED | OUTPATIENT
Start: 2022-11-27 | End: 2022-11-27

## 2022-11-27 RX ORDER — PANTOPRAZOLE SODIUM 20 MG/1
40 TABLET, DELAYED RELEASE ORAL ONCE
Refills: 0 | Status: COMPLETED | OUTPATIENT
Start: 2022-11-27 | End: 2022-11-27

## 2022-11-27 RX ORDER — MEPERIDINE HYDROCHLORIDE 50 MG/ML
25 INJECTION INTRAMUSCULAR; INTRAVENOUS; SUBCUTANEOUS ONCE
Refills: 0 | Status: DISCONTINUED | OUTPATIENT
Start: 2022-11-27 | End: 2022-11-27

## 2022-11-27 RX ORDER — DEXTROSE 50 % IN WATER 50 %
50 SYRINGE (ML) INTRAVENOUS
Refills: 0 | Status: DISCONTINUED | OUTPATIENT
Start: 2022-11-27 | End: 2022-11-27

## 2022-11-27 RX ORDER — CALCIUM GLUCONATE 100 MG/ML
2 VIAL (ML) INTRAVENOUS ONCE
Refills: 0 | Status: COMPLETED | OUTPATIENT
Start: 2022-11-27 | End: 2022-11-27

## 2022-11-27 RX ORDER — POTASSIUM CHLORIDE 20 MEQ
10 PACKET (EA) ORAL ONCE
Refills: 0 | Status: COMPLETED | OUTPATIENT
Start: 2022-11-27 | End: 2022-11-27

## 2022-11-27 RX ORDER — MAGNESIUM SULFATE 500 MG/ML
2 VIAL (ML) INJECTION ONCE
Refills: 0 | Status: COMPLETED | OUTPATIENT
Start: 2022-11-27 | End: 2022-11-27

## 2022-11-27 RX ORDER — POTASSIUM CHLORIDE 20 MEQ
10 PACKET (EA) ORAL
Refills: 0 | Status: COMPLETED | OUTPATIENT
Start: 2022-11-27 | End: 2022-11-27

## 2022-11-27 RX ORDER — SODIUM CHLORIDE 9 MG/ML
1000 INJECTION INTRAMUSCULAR; INTRAVENOUS; SUBCUTANEOUS
Refills: 0 | Status: DISCONTINUED | OUTPATIENT
Start: 2022-11-27 | End: 2022-12-01

## 2022-11-27 RX ORDER — SODIUM CHLORIDE 9 MG/ML
1000 INJECTION, SOLUTION INTRAVENOUS ONCE
Refills: 0 | Status: COMPLETED | OUTPATIENT
Start: 2022-11-27 | End: 2022-11-27

## 2022-11-27 RX ORDER — CEFUROXIME AXETIL 250 MG
1500 TABLET ORAL EVERY 8 HOURS
Refills: 0 | Status: COMPLETED | OUTPATIENT
Start: 2022-11-27 | End: 2022-11-29

## 2022-11-27 RX ORDER — MILRINONE LACTATE 1 MG/ML
0.2 INJECTION, SOLUTION INTRAVENOUS
Qty: 20 | Refills: 0 | Status: DISCONTINUED | OUTPATIENT
Start: 2022-11-27 | End: 2022-11-30

## 2022-11-27 RX ORDER — INSULIN HUMAN 100 [IU]/ML
2 INJECTION, SOLUTION SUBCUTANEOUS
Qty: 50 | Refills: 0 | Status: DISCONTINUED | OUTPATIENT
Start: 2022-11-27 | End: 2022-11-28

## 2022-11-27 RX ORDER — CHLORHEXIDINE GLUCONATE 213 G/1000ML
15 SOLUTION TOPICAL EVERY 12 HOURS
Refills: 0 | Status: DISCONTINUED | OUTPATIENT
Start: 2022-11-27 | End: 2022-11-28

## 2022-11-27 RX ORDER — CHLORHEXIDINE GLUCONATE 213 G/1000ML
5 SOLUTION TOPICAL
Refills: 0 | Status: DISCONTINUED | OUTPATIENT
Start: 2022-11-27 | End: 2022-11-28

## 2022-11-27 RX ORDER — PANTOPRAZOLE SODIUM 20 MG/1
40 TABLET, DELAYED RELEASE ORAL
Refills: 0 | Status: DISCONTINUED | OUTPATIENT
Start: 2022-11-27 | End: 2022-12-06

## 2022-11-27 RX ORDER — ENOXAPARIN SODIUM 100 MG/ML
40 INJECTION SUBCUTANEOUS EVERY 24 HOURS
Refills: 0 | Status: DISCONTINUED | OUTPATIENT
Start: 2022-11-28 | End: 2022-11-29

## 2022-11-27 RX ORDER — ALBUMIN HUMAN 25 %
250 VIAL (ML) INTRAVENOUS ONCE
Refills: 0 | Status: COMPLETED | OUTPATIENT
Start: 2022-11-27 | End: 2022-11-27

## 2022-11-27 RX ORDER — METOPROLOL TARTRATE 50 MG
2.5 TABLET ORAL ONCE
Refills: 0 | Status: DISCONTINUED | OUTPATIENT
Start: 2022-11-27 | End: 2022-11-27

## 2022-11-27 RX ORDER — LIDOCAINE 4 G/100G
1 CREAM TOPICAL DAILY
Refills: 0 | Status: DISCONTINUED | OUTPATIENT
Start: 2022-11-27 | End: 2022-12-06

## 2022-11-27 RX ADMIN — SODIUM CHLORIDE 1000 MILLILITER(S): 9 INJECTION, SOLUTION INTRAVENOUS at 16:00

## 2022-11-27 RX ADMIN — Medication 112 MICROGRAM(S): at 06:10

## 2022-11-27 RX ADMIN — SODIUM CHLORIDE 10 MILLILITER(S): 9 INJECTION INTRAMUSCULAR; INTRAVENOUS; SUBCUTANEOUS at 15:14

## 2022-11-27 RX ADMIN — Medication 125 MILLILITER(S): at 15:08

## 2022-11-27 RX ADMIN — ATORVASTATIN CALCIUM 80 MILLIGRAM(S): 80 TABLET, FILM COATED ORAL at 21:51

## 2022-11-27 RX ADMIN — CHLORHEXIDINE GLUCONATE 15 MILLILITER(S): 213 SOLUTION TOPICAL at 05:55

## 2022-11-27 RX ADMIN — Medication 200 GRAM(S): at 21:24

## 2022-11-27 RX ADMIN — Medication 100 MILLIEQUIVALENT(S): at 15:56

## 2022-11-27 RX ADMIN — CHLORHEXIDINE GLUCONATE 5 MILLILITER(S): 213 SOLUTION TOPICAL at 17:23

## 2022-11-27 RX ADMIN — SODIUM CHLORIDE 5 MILLILITER(S): 9 INJECTION INTRAMUSCULAR; INTRAVENOUS; SUBCUTANEOUS at 15:15

## 2022-11-27 RX ADMIN — Medication 250 MILLIGRAM(S): at 17:42

## 2022-11-27 RX ADMIN — SENNA PLUS 2 TABLET(S): 8.6 TABLET ORAL at 21:51

## 2022-11-27 RX ADMIN — Medication 6.13 MICROGRAM(S)/KG/MIN: at 13:40

## 2022-11-27 RX ADMIN — CHLORHEXIDINE GLUCONATE 1 APPLICATION(S): 213 SOLUTION TOPICAL at 05:55

## 2022-11-27 RX ADMIN — MUPIROCIN 1 APPLICATION(S): 20 OINTMENT TOPICAL at 17:23

## 2022-11-27 RX ADMIN — Medication 100 MILLIEQUIVALENT(S): at 05:54

## 2022-11-27 RX ADMIN — MILRINONE LACTATE 8 MICROGRAM(S)/KG/MIN: 1 INJECTION, SOLUTION INTRAVENOUS at 19:18

## 2022-11-27 RX ADMIN — INSULIN HUMAN 2 UNIT(S)/HR: 100 INJECTION, SOLUTION SUBCUTANEOUS at 15:21

## 2022-11-27 RX ADMIN — Medication 100 MILLIEQUIVALENT(S): at 15:15

## 2022-11-27 RX ADMIN — Medication 50 GRAM(S): at 17:52

## 2022-11-27 RX ADMIN — Medication 125 MILLILITER(S): at 18:08

## 2022-11-27 RX ADMIN — Medication 100 MILLIEQUIVALENT(S): at 20:15

## 2022-11-27 RX ADMIN — PANTOPRAZOLE SODIUM 40 MILLIGRAM(S): 20 TABLET, DELAYED RELEASE ORAL at 18:08

## 2022-11-27 RX ADMIN — MUPIROCIN 1 APPLICATION(S): 20 OINTMENT TOPICAL at 07:05

## 2022-11-27 RX ADMIN — Medication 100 MILLIEQUIVALENT(S): at 21:02

## 2022-11-27 RX ADMIN — Medication 100 MILLIGRAM(S): at 17:18

## 2022-11-27 RX ADMIN — Medication 100 MILLIEQUIVALENT(S): at 16:45

## 2022-11-27 RX ADMIN — Medication 325 MILLIGRAM(S): at 18:08

## 2022-11-27 RX ADMIN — Medication 100 MILLIEQUIVALENT(S): at 21:37

## 2022-11-27 RX ADMIN — Medication 9.81 MICROGRAM(S)/KG/MIN: at 18:00

## 2022-11-27 RX ADMIN — CHLORHEXIDINE GLUCONATE 15 MILLILITER(S): 213 SOLUTION TOPICAL at 17:23

## 2022-11-27 RX ADMIN — PROPOFOL 5 MICROGRAM(S)/KG/MIN: 10 INJECTION, EMULSION INTRAVENOUS at 18:27

## 2022-11-27 NOTE — BRIEF OPERATIVE NOTE - NSICDXBRIEFPREOP_GEN_ALL_CORE_FT
PRE-OP DIAGNOSIS:  CAD (coronary artery disease) 27-Nov-2022 13:52:23  Lisette Espana  Severe mitral insufficiency 27-Nov-2022 13:52:33  Lisette Espana  Non-ST elevation MI (NSTEMI) 27-Nov-2022 13:54:47  Lisette Espana

## 2022-11-27 NOTE — BRIEF OPERATIVE NOTE - NSICDXBRIEFPROCEDURE_GEN_ALL_CORE_FT
PROCEDURES:  CABG, with saphenous vein graft 27-Nov-2022 13:51:22 C3L (LIMA-LAD, SVG-OM1, SVG-OM2) Lisette Espana  Clipping, left atrial appendage 27-Nov-2022 13:51:41 45 V Clip Lisette Espana  Mitral valve replacement 27-Nov-2022 13:51:50 #27 Epic Plus Valve Lisette Espana

## 2022-11-27 NOTE — BRIEF OPERATIVE NOTE - NSICDXBRIEFPOSTOP_GEN_ALL_CORE_FT
POST-OP DIAGNOSIS:  CAD (coronary artery disease) 27-Nov-2022 13:52:41  Lisette Espana  Severe mitral insufficiency 27-Nov-2022 13:52:50  Lisette Espana  NSTEMI (non-ST elevation myocardial infarction) 27-Nov-2022 13:54:57  Lisette Espana

## 2022-11-28 LAB
ALBUMIN SERPL ELPH-MCNC: 3 G/DL — LOW (ref 3.3–5.2)
ALP SERPL-CCNC: 35 U/L — LOW (ref 40–120)
ALT FLD-CCNC: 15 U/L — SIGNIFICANT CHANGE UP
ANION GAP SERPL CALC-SCNC: 10 MMOL/L — SIGNIFICANT CHANGE UP (ref 5–17)
APTT BLD: 27.1 SEC — LOW (ref 27.5–35.5)
AST SERPL-CCNC: 62 U/L — HIGH
BASE EXCESS BLDV CALC-SCNC: 2.1 MMOL/L — SIGNIFICANT CHANGE UP (ref -2–3)
BILIRUB SERPL-MCNC: 1.3 MG/DL — SIGNIFICANT CHANGE UP (ref 0.4–2)
BUN SERPL-MCNC: 19.1 MG/DL — SIGNIFICANT CHANGE UP (ref 8–20)
CA-I SERPL-SCNC: 1.14 MMOL/L — LOW (ref 1.15–1.33)
CALCIUM SERPL-MCNC: 8.1 MG/DL — LOW (ref 8.4–10.5)
CHLORIDE BLDV-SCNC: 108 MMOL/L — SIGNIFICANT CHANGE UP (ref 96–108)
CHLORIDE SERPL-SCNC: 106 MMOL/L — SIGNIFICANT CHANGE UP (ref 96–108)
CK MB CFR SERPL CALC: 23.6 NG/ML — HIGH (ref 0–6.7)
CK SERPL-CCNC: 425 U/L — HIGH (ref 25–170)
CO2 SERPL-SCNC: 25 MMOL/L — SIGNIFICANT CHANGE UP (ref 22–29)
CREAT SERPL-MCNC: 0.83 MG/DL — SIGNIFICANT CHANGE UP (ref 0.5–1.3)
EGFR: 70 ML/MIN/1.73M2 — SIGNIFICANT CHANGE UP
GAS PNL BLDA: SIGNIFICANT CHANGE UP
GAS PNL BLDV: 138 MMOL/L — SIGNIFICANT CHANGE UP (ref 136–145)
GAS PNL BLDV: SIGNIFICANT CHANGE UP
GLUCOSE BLDC GLUCOMTR-MCNC: 106 MG/DL — HIGH (ref 70–99)
GLUCOSE BLDC GLUCOMTR-MCNC: 109 MG/DL — HIGH (ref 70–99)
GLUCOSE BLDC GLUCOMTR-MCNC: 112 MG/DL — HIGH (ref 70–99)
GLUCOSE BLDC GLUCOMTR-MCNC: 115 MG/DL — HIGH (ref 70–99)
GLUCOSE BLDC GLUCOMTR-MCNC: 118 MG/DL — HIGH (ref 70–99)
GLUCOSE BLDC GLUCOMTR-MCNC: 120 MG/DL — HIGH (ref 70–99)
GLUCOSE BLDC GLUCOMTR-MCNC: 122 MG/DL — HIGH (ref 70–99)
GLUCOSE BLDC GLUCOMTR-MCNC: 125 MG/DL — HIGH (ref 70–99)
GLUCOSE BLDC GLUCOMTR-MCNC: 129 MG/DL — HIGH (ref 70–99)
GLUCOSE BLDC GLUCOMTR-MCNC: 140 MG/DL — HIGH (ref 70–99)
GLUCOSE BLDC GLUCOMTR-MCNC: 145 MG/DL — HIGH (ref 70–99)
GLUCOSE BLDC GLUCOMTR-MCNC: 146 MG/DL — HIGH (ref 70–99)
GLUCOSE BLDC GLUCOMTR-MCNC: 151 MG/DL — HIGH (ref 70–99)
GLUCOSE BLDC GLUCOMTR-MCNC: 99 MG/DL — SIGNIFICANT CHANGE UP (ref 70–99)
GLUCOSE BLDV-MCNC: 119 MG/DL — HIGH (ref 70–99)
GLUCOSE SERPL-MCNC: 101 MG/DL — HIGH (ref 70–99)
HCO3 BLDV-SCNC: 27 MMOL/L — SIGNIFICANT CHANGE UP (ref 22–29)
HCT VFR BLD CALC: 25.3 % — LOW (ref 34.5–45)
HCT VFR BLD CALC: 26.3 % — LOW (ref 34.5–45)
HCT VFR BLDA CALC: 30 % — SIGNIFICANT CHANGE UP
HGB BLD CALC-MCNC: 9.9 G/DL — LOW (ref 11.7–16.1)
HGB BLD-MCNC: 9 G/DL — LOW (ref 11.5–15.5)
HGB BLD-MCNC: 9.4 G/DL — LOW (ref 11.5–15.5)
INR BLD: 1.27 RATIO — HIGH (ref 0.88–1.16)
LACTATE BLDV-MCNC: 0.9 MMOL/L — SIGNIFICANT CHANGE UP (ref 0.5–2)
MAGNESIUM SERPL-MCNC: 2.8 MG/DL — HIGH (ref 1.6–2.6)
MCHC RBC-ENTMCNC: 30.2 PG — SIGNIFICANT CHANGE UP (ref 27–34)
MCHC RBC-ENTMCNC: 30.3 PG — SIGNIFICANT CHANGE UP (ref 27–34)
MCHC RBC-ENTMCNC: 35.6 GM/DL — SIGNIFICANT CHANGE UP (ref 32–36)
MCHC RBC-ENTMCNC: 35.7 GM/DL — SIGNIFICANT CHANGE UP (ref 32–36)
MCV RBC AUTO: 84.6 FL — SIGNIFICANT CHANGE UP (ref 80–100)
MCV RBC AUTO: 85.2 FL — SIGNIFICANT CHANGE UP (ref 80–100)
PCO2 BLDV: 41 MMHG — SIGNIFICANT CHANGE UP (ref 39–42)
PH BLDV: 7.42 — SIGNIFICANT CHANGE UP (ref 7.32–7.43)
PLATELET # BLD AUTO: 133 K/UL — LOW (ref 150–400)
PLATELET # BLD AUTO: 99 K/UL — LOW (ref 150–400)
PO2 BLDV: <42 MMHG — SIGNIFICANT CHANGE UP (ref 25–45)
POTASSIUM BLDV-SCNC: 3.9 MMOL/L — SIGNIFICANT CHANGE UP (ref 3.5–5.1)
POTASSIUM SERPL-MCNC: 4.8 MMOL/L — SIGNIFICANT CHANGE UP (ref 3.5–5.3)
POTASSIUM SERPL-SCNC: 4.8 MMOL/L — SIGNIFICANT CHANGE UP (ref 3.5–5.3)
PROT SERPL-MCNC: 4.4 G/DL — LOW (ref 6.6–8.7)
PROTHROM AB SERPL-ACNC: 14.8 SEC — HIGH (ref 10.5–13.4)
RBC # BLD: 2.97 M/UL — LOW (ref 3.8–5.2)
RBC # BLD: 3.11 M/UL — LOW (ref 3.8–5.2)
RBC # FLD: 15.9 % — HIGH (ref 10.3–14.5)
RBC # FLD: 16.8 % — HIGH (ref 10.3–14.5)
SAO2 % BLDV: 64.8 % — SIGNIFICANT CHANGE UP
SODIUM SERPL-SCNC: 141 MMOL/L — SIGNIFICANT CHANGE UP (ref 135–145)
TROPONIN T SERPL-MCNC: 2.75 NG/ML — HIGH (ref 0–0.06)
WBC # BLD: 10.94 K/UL — HIGH (ref 3.8–10.5)
WBC # BLD: 18.53 K/UL — HIGH (ref 3.8–10.5)
WBC # FLD AUTO: 10.94 K/UL — HIGH (ref 3.8–10.5)
WBC # FLD AUTO: 18.53 K/UL — HIGH (ref 3.8–10.5)

## 2022-11-28 PROCEDURE — 99232 SBSQ HOSP IP/OBS MODERATE 35: CPT

## 2022-11-28 PROCEDURE — 99292 CRITICAL CARE ADDL 30 MIN: CPT | Mod: FS,24

## 2022-11-28 PROCEDURE — 93010 ELECTROCARDIOGRAM REPORT: CPT

## 2022-11-28 PROCEDURE — 99291 CRITICAL CARE FIRST HOUR: CPT | Mod: 25

## 2022-11-28 PROCEDURE — 71045 X-RAY EXAM CHEST 1 VIEW: CPT | Mod: 26

## 2022-11-28 RX ORDER — FUROSEMIDE 40 MG
40 TABLET ORAL ONCE
Refills: 0 | Status: COMPLETED | OUTPATIENT
Start: 2022-11-28 | End: 2022-11-28

## 2022-11-28 RX ORDER — CALCIUM GLUCONATE 100 MG/ML
2 VIAL (ML) INTRAVENOUS ONCE
Refills: 0 | Status: COMPLETED | OUTPATIENT
Start: 2022-11-28 | End: 2022-11-28

## 2022-11-28 RX ORDER — POLYETHYLENE GLYCOL 3350 17 G/17G
17 POWDER, FOR SOLUTION ORAL DAILY
Refills: 0 | Status: DISCONTINUED | OUTPATIENT
Start: 2022-11-28 | End: 2022-12-06

## 2022-11-28 RX ORDER — HYDROMORPHONE HYDROCHLORIDE 2 MG/ML
0.25 INJECTION INTRAMUSCULAR; INTRAVENOUS; SUBCUTANEOUS ONCE
Refills: 0 | Status: DISCONTINUED | OUTPATIENT
Start: 2022-11-28 | End: 2022-11-28

## 2022-11-28 RX ORDER — CHLORHEXIDINE GLUCONATE 213 G/1000ML
1 SOLUTION TOPICAL
Refills: 0 | Status: DISCONTINUED | OUTPATIENT
Start: 2022-11-28 | End: 2022-12-06

## 2022-11-28 RX ORDER — INSULIN LISPRO 100/ML
VIAL (ML) SUBCUTANEOUS
Refills: 0 | Status: DISCONTINUED | OUTPATIENT
Start: 2022-11-28 | End: 2022-12-01

## 2022-11-28 RX ORDER — NICARDIPINE HYDROCHLORIDE 30 MG/1
5 CAPSULE, EXTENDED RELEASE ORAL
Qty: 40 | Refills: 0 | Status: DISCONTINUED | OUTPATIENT
Start: 2022-11-28 | End: 2022-11-30

## 2022-11-28 RX ORDER — DEXTROSE 10 % IN WATER 10 %
250 INTRAVENOUS SOLUTION INTRAVENOUS ONCE
Refills: 0 | Status: DISCONTINUED | OUTPATIENT
Start: 2022-11-28 | End: 2022-12-01

## 2022-11-28 RX ORDER — SODIUM CHLORIDE 9 MG/ML
1000 INJECTION, SOLUTION INTRAVENOUS
Refills: 0 | Status: DISCONTINUED | OUTPATIENT
Start: 2022-11-28 | End: 2022-12-01

## 2022-11-28 RX ORDER — DEXTROSE 50 % IN WATER 50 %
15 SYRINGE (ML) INTRAVENOUS ONCE
Refills: 0 | Status: DISCONTINUED | OUTPATIENT
Start: 2022-11-28 | End: 2022-12-01

## 2022-11-28 RX ADMIN — Medication 250 MILLIGRAM(S): at 04:17

## 2022-11-28 RX ADMIN — Medication 40 MILLIGRAM(S): at 04:43

## 2022-11-28 RX ADMIN — LIDOCAINE 1 PATCH: 4 CREAM TOPICAL at 19:33

## 2022-11-28 RX ADMIN — Medication 250 MILLIGRAM(S): at 17:46

## 2022-11-28 RX ADMIN — INSULIN HUMAN 2 UNIT(S)/HR: 100 INJECTION, SOLUTION SUBCUTANEOUS at 02:09

## 2022-11-28 RX ADMIN — Medication 100 MILLIEQUIVALENT(S): at 01:05

## 2022-11-28 RX ADMIN — Medication 975 MILLIGRAM(S): at 22:00

## 2022-11-28 RX ADMIN — Medication 112 MICROGRAM(S): at 06:18

## 2022-11-28 RX ADMIN — Medication 40 MILLIGRAM(S): at 22:36

## 2022-11-28 RX ADMIN — HYDROMORPHONE HYDROCHLORIDE 0.25 MILLIGRAM(S): 2 INJECTION INTRAMUSCULAR; INTRAVENOUS; SUBCUTANEOUS at 17:28

## 2022-11-28 RX ADMIN — Medication 100 MILLIEQUIVALENT(S): at 02:08

## 2022-11-28 RX ADMIN — Medication 975 MILLIGRAM(S): at 14:27

## 2022-11-28 RX ADMIN — Medication 100 MILLIGRAM(S): at 00:12

## 2022-11-28 RX ADMIN — Medication 975 MILLIGRAM(S): at 21:44

## 2022-11-28 RX ADMIN — MUPIROCIN 1 APPLICATION(S): 20 OINTMENT TOPICAL at 17:45

## 2022-11-28 RX ADMIN — POLYETHYLENE GLYCOL 3350 17 GRAM(S): 17 POWDER, FOR SOLUTION ORAL at 12:56

## 2022-11-28 RX ADMIN — SENNA PLUS 2 TABLET(S): 8.6 TABLET ORAL at 21:45

## 2022-11-28 RX ADMIN — Medication 200 GRAM(S): at 10:26

## 2022-11-28 RX ADMIN — Medication 100 MILLIGRAM(S): at 07:38

## 2022-11-28 RX ADMIN — MUPIROCIN 1 APPLICATION(S): 20 OINTMENT TOPICAL at 06:19

## 2022-11-28 RX ADMIN — PANTOPRAZOLE SODIUM 40 MILLIGRAM(S): 20 TABLET, DELAYED RELEASE ORAL at 06:18

## 2022-11-28 RX ADMIN — SODIUM CHLORIDE 10 MILLILITER(S): 9 INJECTION INTRAMUSCULAR; INTRAVENOUS; SUBCUTANEOUS at 02:09

## 2022-11-28 RX ADMIN — Medication 100 MILLIGRAM(S): at 16:35

## 2022-11-28 RX ADMIN — Medication 975 MILLIGRAM(S): at 07:39

## 2022-11-28 RX ADMIN — OXYCODONE HYDROCHLORIDE 5 MILLIGRAM(S): 5 TABLET ORAL at 17:36

## 2022-11-28 RX ADMIN — HYDROMORPHONE HYDROCHLORIDE 0.25 MILLIGRAM(S): 2 INJECTION INTRAMUSCULAR; INTRAVENOUS; SUBCUTANEOUS at 17:13

## 2022-11-28 RX ADMIN — LIDOCAINE 1 PATCH: 4 CREAM TOPICAL at 12:56

## 2022-11-28 RX ADMIN — Medication 100 MILLIEQUIVALENT(S): at 00:12

## 2022-11-28 RX ADMIN — ATORVASTATIN CALCIUM 80 MILLIGRAM(S): 80 TABLET, FILM COATED ORAL at 21:48

## 2022-11-28 RX ADMIN — OXYCODONE HYDROCHLORIDE 5 MILLIGRAM(S): 5 TABLET ORAL at 16:28

## 2022-11-28 RX ADMIN — CHLORHEXIDINE GLUCONATE 1 APPLICATION(S): 213 SOLUTION TOPICAL at 06:19

## 2022-11-28 RX ADMIN — SERTRALINE 100 MILLIGRAM(S): 25 TABLET, FILM COATED ORAL at 12:56

## 2022-11-28 RX ADMIN — Medication 81 MILLIGRAM(S): at 12:55

## 2022-11-28 RX ADMIN — SODIUM CHLORIDE 5 MILLILITER(S): 9 INJECTION INTRAMUSCULAR; INTRAVENOUS; SUBCUTANEOUS at 02:09

## 2022-11-28 RX ADMIN — NICARDIPINE HYDROCHLORIDE 25 MG/HR: 30 CAPSULE, EXTENDED RELEASE ORAL at 06:19

## 2022-11-28 NOTE — PROGRESS NOTE ADULT - SUBJECTIVE AND OBJECTIVE BOX
[facility]                                                         CARDIOLOGY AT [place] [address]                                                         Telephone: [phone]. Fax:[phone]                                                                             PROGRESS NOTE    Reason for follow up: s/p cabg  Update:       Review of symptoms:   Cardiac:  No chest pain. No dyspnea. No palpitations.  Respiratory: no cough. No dyspnea  Gastrointestinal: No diarrhea. No abdominal pain. No bleeding.   Neuro: No focal neuro complaints.      Vitals:  T(C): 36.3 (11-28-22 @ 09:00), Max: 37 (11-27-22 @ 21:30)  HR: 92 (11-28-22 @ 09:00) (79 - 119)  BP: 152/72 (11-27-22 @ 21:00) (140/76 - 152/72)  RR: 16 (11-28-22 @ 09:00) (12 - 23)  SpO2: 91% (11-28-22 @ 09:00) (91% - 100%)  Wt(kg): --  I&O's Summary    27 Nov 2022 07:01  -  28 Nov 2022 07:00  --------------------------------------------------------  IN: 3709.5 mL / OUT: 2400 mL / NET: 1309.5 mL    28 Nov 2022 07:01  -  28 Nov 2022 09:03  --------------------------------------------------------  IN: 315 mL / OUT: 250 mL / NET: 65 mL      Weight (kg): 65.4 (11-23 @ 21:15)      PHYSICAL EXAM:  Appearance: Comfortable. No acute distress  HEENT:  Atraumatic. Normocephalic.  Normal oral mucosa  Neurologic: A & O x 3, no gross focal deficits.  Cardiovascular: RRR S1 S2, No murmur, no rubs/gallops. No JVD  Respiratory: Lungs clear to auscultation, unlabored   Gastrointestinal:  Soft, Non-tender, + BS  Lower Extremities: 2+ Peripheral Pulses, No clubbing, cyanosis, or edema  Psychiatry: Patient is calm. No agitation.   Skin: warm and dry.      CURRENT CARDIAC MEDICATIONS:  milrinone Infusion 0.408 MICROgram(s)/kG/Min IV Continuous <Continuous>  niCARdipine Infusion 5 mG/Hr IV Continuous <Continuous>        CURRENT OTHER MEDICATIONS:  cefuroxime  IVPB 1500 milliGRAM(s) IV Intermittent every 8 hours  vancomycin  IVPB 1000 milliGRAM(s) IV Intermittent every 12 hours  acetaminophen     Tablet .. 975 milliGRAM(s) Oral every 8 hours  oxyCODONE    IR 5 milliGRAM(s) Oral every 6 hours PRN Moderate Pain (4 - 6)  sertraline 100 milliGRAM(s) Oral daily  pantoprazole    Tablet 40 milliGRAM(s) Oral before breakfast  polyethylene glycol 3350 17 Gram(s) Oral daily  senna 2 Tablet(s) Oral at bedtime  atorvastatin 80 milliGRAM(s) Oral at bedtime  insulin regular Infusion 2 Unit(s)/Hr (2 mL/Hr) IV Continuous <Continuous>  levothyroxine 112 MICROGram(s) Oral daily  aspirin enteric coated 81 milliGRAM(s) Oral daily  chlorhexidine 2% Cloths 1 Application(s) Topical <User Schedule>  enoxaparin Injectable 40 milliGRAM(s) SubCutaneous every 24 hours  lidocaine   4% Patch 1 Patch Transdermal daily  mupirocin 2% Nasal 1 Application(s) Both Nostrils two times a day, Stop order after: 7 Days  sodium chloride 0.9%. 1000 milliLiter(s) (10 mL/Hr) IV Continuous <Continuous>  sodium chloride 0.9%. 1000 milliLiter(s) (5 mL/Hr) IV Continuous <Continuous>        LABS:	 	  ( 28 Nov 2022 02:00 )  Troponin T  2.75<H>,  CPK  425<H>, CKMB  X    , BNP X        , ( 27 Nov 2022 14:00 )  Troponin T  2.93<H>,  CPK  373<H>, CKMB  X    , BNP X        , ( 26 Nov 2022 08:35 )  Troponin T  2.57<H>,  CPK  344<H>, CKMB  X    , BNP X                                  9.4    10.94 )-----------( 99       ( 28 Nov 2022 02:00 )             26.3     11-28    141  |  106  |  19.1  ----------------------------<  101<H>  4.8   |  25.0  |  0.83    Ca    8.1<L>      28 Nov 2022 02:00  Mg     2.8     11-28    TPro  4.4<L>  /  Alb  3.0<L>  /  TBili  1.3  /  DBili  x   /  AST  62<H>  /  ALT  15  /  AlkPhos  35<L>  11-28    PT/INR/PTT ( 28 Nov 2022 02:00 )                       :                       :      14.8         :       27.1                  .        .                   .              .           .       1.27        .                                       Lipid Profile: Date: 11-24 @ 05:00  Total cholesterol 195; Direct LDL: --; HDL: 65; Triglycerides:106    HgA1c:   TSH: Thyroid Stimulating Hormone, Serum: 1.32 uIU/mL        TELEMETRY:   ECG:      DIAGNOSTIC TESTING:  [ ] Echocardiogram:   [ ]  Catheterization:  [ ] Stress Test:    OTHER: 	                                                                [facility]                                                         CARDIOLOGY AT [place] [address]                                                         Telephone: [phone]. Fax:[phone]                                                                             PROGRESS NOTE    Reason for follow up: s/p cabg yesterday   Update:       Review of symptoms:   Cardiac:  No chest pain. No dyspnea. No palpitations.  Respiratory: no cough. No dyspnea  Gastrointestinal: No diarrhea. No abdominal pain. No bleeding.   Neuro: No focal neuro complaints.      Vitals:  T(C): 36.3 (11-28-22 @ 09:00), Max: 37 (11-27-22 @ 21:30)  HR: 92 (11-28-22 @ 09:00) (79 - 119)  BP: 152/72 (11-27-22 @ 21:00) (140/76 - 152/72)  RR: 16 (11-28-22 @ 09:00) (12 - 23)  SpO2: 91% (11-28-22 @ 09:00) (91% - 100%)  Wt(kg): --  I&O's Summary    27 Nov 2022 07:01  -  28 Nov 2022 07:00  --------------------------------------------------------  IN: 3709.5 mL / OUT: 2400 mL / NET: 1309.5 mL    28 Nov 2022 07:01  -  28 Nov 2022 09:03  --------------------------------------------------------  IN: 315 mL / OUT: 250 mL / NET: 65 mL      Weight (kg): 65.4 (11-23 @ 21:15)      PHYSICAL EXAM:  Appearance: Comfortable. No acute distress  HEENT:  Atraumatic. Normocephalic.  Normal oral mucosa  Neurologic: A & O x 3, no gross focal deficits.  Cardiovascular: RRR S1 S2, No murmur, no rubs/gallops. No JVD  Respiratory: Lungs clear to auscultation, unlabored   Gastrointestinal:  Soft, Non-tender, + BS  Lower Extremities: 2+ Peripheral Pulses, No clubbing, cyanosis, or edema  Psychiatry: Patient is calm. No agitation.   Skin: warm and dry.      CURRENT CARDIAC MEDICATIONS:  milrinone Infusion 0.408 MICROgram(s)/kG/Min IV Continuous <Continuous>  niCARdipine Infusion 5 mG/Hr IV Continuous <Continuous>        CURRENT OTHER MEDICATIONS:  cefuroxime  IVPB 1500 milliGRAM(s) IV Intermittent every 8 hours  vancomycin  IVPB 1000 milliGRAM(s) IV Intermittent every 12 hours  acetaminophen     Tablet .. 975 milliGRAM(s) Oral every 8 hours  oxyCODONE    IR 5 milliGRAM(s) Oral every 6 hours PRN Moderate Pain (4 - 6)  sertraline 100 milliGRAM(s) Oral daily  pantoprazole    Tablet 40 milliGRAM(s) Oral before breakfast  polyethylene glycol 3350 17 Gram(s) Oral daily  senna 2 Tablet(s) Oral at bedtime  atorvastatin 80 milliGRAM(s) Oral at bedtime  insulin regular Infusion 2 Unit(s)/Hr (2 mL/Hr) IV Continuous <Continuous>  levothyroxine 112 MICROGram(s) Oral daily  aspirin enteric coated 81 milliGRAM(s) Oral daily  chlorhexidine 2% Cloths 1 Application(s) Topical <User Schedule>  enoxaparin Injectable 40 milliGRAM(s) SubCutaneous every 24 hours  lidocaine   4% Patch 1 Patch Transdermal daily  mupirocin 2% Nasal 1 Application(s) Both Nostrils two times a day, Stop order after: 7 Days  sodium chloride 0.9%. 1000 milliLiter(s) (10 mL/Hr) IV Continuous <Continuous>  sodium chloride 0.9%. 1000 milliLiter(s) (5 mL/Hr) IV Continuous <Continuous>        LABS:	 	  ( 28 Nov 2022 02:00 )  Troponin T  2.75<H>,  CPK  425<H>, CKMB  X    , BNP X        , ( 27 Nov 2022 14:00 )  Troponin T  2.93<H>,  CPK  373<H>, CKMB  X    , BNP X        , ( 26 Nov 2022 08:35 )  Troponin T  2.57<H>,  CPK  344<H>, CKMB  X    , BNP X                                  9.4    10.94 )-----------( 99       ( 28 Nov 2022 02:00 )             26.3     11-28    141  |  106  |  19.1  ----------------------------<  101<H>  4.8   |  25.0  |  0.83    Ca    8.1<L>      28 Nov 2022 02:00  Mg     2.8     11-28    TPro  4.4<L>  /  Alb  3.0<L>  /  TBili  1.3  /  DBili  x   /  AST  62<H>  /  ALT  15  /  AlkPhos  35<L>  11-28    PT/INR/PTT ( 28 Nov 2022 02:00 )                       :                       :      14.8         :       27.1                  .        .                   .              .           .       1.27        .                                       Lipid Profile: Date: 11-24 @ 05:00  Total cholesterol 195; Direct LDL: --; HDL: 65; Triglycerides:106    HgA1c:   TSH: Thyroid Stimulating Hormone, Serum: 1.32 uIU/mL        TELEMETRY:   ECG:      DIAGNOSTIC TESTING:  [ ] Echocardiogram:   < from: TTE Echo Limited or F/U (11.25.22 @ 16:29) >    LV Wall Scoring:  The entire apex is akinetic. The basal and mid anterior septum, basal and   mid  inferior septum, basal and mid inferior wall, and mid anterior segment are  hypokinetic.    Right Atrium: The right atrium is normal in size.  Mitral Valve: Mild thickening of the anterior and posterior mitral valve   leaflets. There is moderate mitral annular calcification. No evidence of   mitral stenosis. Severe mitral valve regurgitation is seen.  Aortic Valve: No evidence of aortic stenosis. Mild aortic valve   regurgitation is seen.      Summary:   1. Left ventricular ejection fraction, by visual estimation, is 35 to   40%.   2. Mildly decreased global left ventricular systolic function.   3. Mildly decreased segmental left ventricular systolic function.   4. Multiple left ventricular regional wall motion abnormalities exist.   See wall motion findings.   5. LV Ejection Fraction by Batres's Method with a biplane EF of 38 %.   6. There is moderate asymmetric left ventricular hypertrophy.   7. Mild thickening of the anterior and posterior mitral valve leaflets.   8. Moderate mitral annular calcification.   9. Severe mitral valve regurgitation.  10. Mild aortic regurgitation.    MD Kaci Electronically signed on 11/25/2022 at 5:50:47 PM    < end of copied text >    [ ]  Catheterization:  [ ] Stress Test:    OTHER: 	                                                                [facility]                                                         CARDIOLOGY AT [place] [address]                                                         Telephone: [phone]. Fax:[phone]                                                                             PROGRESS NOTE    Reason for follow up: s/p cabg yesterday   Update: seen in CTICU, TVP wires in place. Chest tubes, marrufo, swan, TLC, sternal dressing  milrinone, insulin gtts. Extubated this am   States feels ok.       Review of symptoms:   Cardiac:  No chest pain. No dyspnea. No palpitations.  Respiratory: no cough. No dyspnea  Gastrointestinal: No diarrhea. No abdominal pain. No bleeding.   Neuro: No focal neuro complaints.      Vitals:  T(C): 36.3 (11-28-22 @ 09:00), Max: 37 (11-27-22 @ 21:30)  HR: 92 (11-28-22 @ 09:00) (79 - 119)  BP: 152/72 (11-27-22 @ 21:00) (140/76 - 152/72)  RR: 16 (11-28-22 @ 09:00) (12 - 23)  SpO2: 91% (11-28-22 @ 09:00) (91% - 100%)        I&O's Summary    27 Nov 2022 07:01  -  28 Nov 2022 07:00  --------------------------------------------------------  IN: 3709.5 mL / OUT: 2400 mL / NET: 1309.5 mL    28 Nov 2022 07:01  -  28 Nov 2022 09:03  --------------------------------------------------------  IN: 315 mL / OUT: 250 mL / NET: 65 mL      Weight (kg): 65.4 (11-23 @ 21:15)      PHYSICAL EXAM:  Appearance: No acute distress  HEENT:  Atraumatic. Normocephalic. [place], TLC RIJ  Neurologic: A & O x 3, no gross focal deficits.  Cardiovascular: RRR S1 S2, No murmur, no rubs/gallops. No JVD. sternal dressing  Respiratory: Lungs clear to auscultation, unlabored   Gastrointestinal:  Soft, Non-tender, + BS  Lower Extremities: No edema  Psychiatry: Patient is calm. No agitation.   Skin: warm and dry. marrufo cath, chest tubes. a line      CURRENT CARDIAC MEDICATIONS:  milrinone Infusion 0.408 MICROgram(s)/kG/Min IV Continuous <Continuous>  niCARdipine Infusion 5 mG/Hr IV Continuous <Continuous>        CURRENT OTHER MEDICATIONS:  cefuroxime  IVPB 1500 milliGRAM(s) IV Intermittent every 8 hours  vancomycin  IVPB 1000 milliGRAM(s) IV Intermittent every 12 hours  acetaminophen     Tablet .. 975 milliGRAM(s) Oral every 8 hours  oxyCODONE    IR 5 milliGRAM(s) Oral every 6 hours PRN Moderate Pain (4 - 6)  sertraline 100 milliGRAM(s) Oral daily  pantoprazole    Tablet 40 milliGRAM(s) Oral before breakfast  polyethylene glycol 3350 17 Gram(s) Oral daily  senna 2 Tablet(s) Oral at bedtime  atorvastatin 80 milliGRAM(s) Oral at bedtime  insulin regular Infusion 2 Unit(s)/Hr (2 mL/Hr) IV Continuous <Continuous>  levothyroxine 112 MICROGram(s) Oral daily  aspirin enteric coated 81 milliGRAM(s) Oral daily  chlorhexidine 2% Cloths 1 Application(s) Topical <User Schedule>  enoxaparin Injectable 40 milliGRAM(s) SubCutaneous every 24 hours  lidocaine   4% Patch 1 Patch Transdermal daily  mupirocin 2% Nasal 1 Application(s) Both Nostrils two times a day, Stop order after: 7 Days  sodium chloride 0.9%. 1000 milliLiter(s) (10 mL/Hr) IV Continuous <Continuous>  sodium chloride 0.9%. 1000 milliLiter(s) (5 mL/Hr) IV Continuous <Continuous>        LABS:	 	  ( 28 Nov 2022 02:00 )  Troponin T  2.75<H>,  CPK  425<H>, CKMB  X    , BNP X      , ( 27 Nov 2022 14:00 )  Troponin T  2.93<H>,  CPK  373<H>, CKMB  X    , BNP X      , ( 26 Nov 2022 08:35 )  Troponin T  2.57<H>,  CPK  344<H>, CKMB  X    , BNP X                                  9.4    10.94 )-----------( 99       ( 28 Nov 2022 02:00 )             26.3     11-28    141  |  106  |  19.1  ----------------------------<  101<H>  4.8   |  25.0  |  0.83    Ca    8.1<L>      28 Nov 2022 02:00  Mg     2.8     11-28    TPro  4.4<L>  /  Alb  3.0<L>  /  TBili  1.3  /  DBili  x   /  AST  62<H>  /  ALT  15  /  AlkPhos  35<L>  11-28    PT/INR/PTT ( 28 Nov 2022 02:00 )                       :                       :      14.8         :       27.1                  .        .                   .              .           .       1.27        .                                       Lipid Profile: Date: 11-24 @ 05:00  Total cholesterol 195; Direct LDL: --; HDL: 65; Triglycerides:106        TSH: Thyroid Stimulating Hormone, Serum: 1.32 uIU/mL        TELEMETRY: Reviewed SR, NSVT       DIAGNOSTIC TESTING:  [ ] Echocardiogram:   < from: TTE Echo Limited or F/U (11.25.22 @ 16:29) >    LV Wall Scoring:  The entire apex is akinetic. The basal and mid anterior septum, basal and   mid  inferior septum, basal and mid inferior wall, and mid anterior segment are  hypokinetic.    Right Atrium: The right atrium is normal in size.  Mitral Valve: Mild thickening of the anterior and posterior mitral valve   leaflets. There is moderate mitral annular calcification. No evidence of   mitral stenosis. Severe mitral valve regurgitation is seen.  Aortic Valve: No evidence of aortic stenosis. Mild aortic valve   regurgitation is seen.      Summary:   1. Left ventricular ejection fraction, by visual estimation, is 35 to   40%.   2. Mildly decreased global left ventricular systolic function.   3. Mildly decreased segmental left ventricular systolic function.   4. Multiple left ventricular regional wall motion abnormalities exist.   See wall motion findings.   5. LV Ejection Fraction by Batres's Method with a biplane EF of 38 %.   6. There is moderate asymmetric left ventricular hypertrophy.   7. Mild thickening of the anterior and posterior mitral valve leaflets.   8. Moderate mitral annular calcification.   9. Severe mitral valve regurgitation.  10. Mild aortic regurgitation.    MD Kaci Electronically signed on 11/25/2022 at 5:50:47 PM    < end of copied text >

## 2022-11-28 NOTE — PROGRESS NOTE ADULT - NS ATTEND AMEND GEN_ALL_CORE FT
Patient seen and examined by me.  I have discussed my recommendation with the PA which are outlined above.  Will follow.
Patient seen and examined by me.  Patient had IABP removed, then she became hypotensive , + Chest pain.  Patient is taken back to Cath lab for IABP  I have discussed my recommendation with the PA which are outlined above.  Discussed with CT Surgery to transfer her to CT Surgery Service.  Will follow.
Triple vessel coronary artery disease:  11/27 CABG-  C3L (LIMA-LAD, SVG-OM1, SVG-OM2); clipping DEVONTE, Mitral valve replacement remains on milrinone, insulin. extubated in early am,  management as per [facility] team Pt transfer from [facility]. follow up with primary cardiologist after discharge.

## 2022-11-28 NOTE — AIRWAY REMOVAL NOTE  ADULT & PEDS - ARTIFICAL AIRWAY REMOVAL COMMENTS
Pt extubated status post ABg results on CPAP 5/5/50% - PA at [facility] to assess pt and  orders given to extubate. Pt extubated to 50% CAM and no complications noted.  Pt resting comfortably at this time with HR 95 and pulse ox95% on 50% CAM

## 2022-11-28 NOTE — CHART NOTE - NSCHARTNOTEFT_GEN_A_CORE
6-hour post-removal of IABP evaluation:  Vital signs are stable, neuro-vascular status of the lower extremities is intact/stable and there is no evidence of hematoma of  the left groin.

## 2022-11-28 NOTE — PROGRESS NOTE ADULT - PA/NP COMMENTS
actively treating patient for cardiogenic shock, hypotension and acute blood loss anemia       Evaluating/treating patient's acute illness with high probability of causing end organ damage and or life threatening, reviewing data/labs/imaging, discussing case with multidisciplinary team, discussing plan/goals of care with patient/family to prevent further life threatening depreciation of the patient's condition . Non-inclusive of procedure time.
MR CAD with IABP

## 2022-11-28 NOTE — PROGRESS NOTE ADULT - SUBJECTIVE AND OBJECTIVE BOX
PRAVEEN GONZALEZ  MRN-[id number]    HPI:  81 yo female, PMHx of LBBB, HTN, HLD, mitral valve prolapse, hypothyroidism, breast CA 10 years ago s/p chemotherapy and left mastectomy, admitted to [facility] 11/22 after experiencing acute onset chest pain with radiation to back that onset the night prior. Ruled in for NSTEMI with positive troponin 430 --> 446. She underwent diagnostic cardiac catheterization with Dr. Garibay, which revealed triple vessel CAD, with severe LAD, LCx, and occlusion of RCA. Ventriculogram with EF 30% with concerns for severe MR. She was given ASA, Plavix load 600mg, and placed on Heparin gtt for ACS. An IABP and SGC were placed and she was transferred to [facility] MICU for further evaluation and medical management. Upon arrival to MICU, hemodynamics stable, ADBP >170 on 1:1. Patient was on 100% NRB weaned to 3lpm nasal cannula laying supine comfortably. Denies chest pain, dyspnea, palpitations, orthopnea, LE edema.  (23 Nov 2022 21:40)      Surgery/Hospital Course:  ·  PRE-OP DIAGNOSIS:  CAD (coronary artery disease)   Severe mitral insufficiency   Non-ST elevation MI (NSTEMI)   ·  POST-OP DIAGNOSIS:  CAD (coronary artery disease)  Severe mitral insufficiency  NSTEMI (non-ST elevation myocardial infarction)  ·  PROCEDURES:  CABG, with saphenous vein graft 27-Nov-2022   C3L (LIMA-LAD, SVG-OM1, SVG-OM2)   Clipping, left atrial appendage ,  45 V Clip   Mitral valve replacement , Epic Plus Valve   Today:  No acute events  DC IABP  DC cardene  continue Primacor       ICU Vital Signs Last 24 Hrs  T(C): 36.3 (28 Nov 2022 11:00), Max: 37 (27 Nov 2022 21:30)  T(F): 97.3 (28 Nov 2022 11:00), Max: 98.6 (27 Nov 2022 21:30)  HR: 93 (28 Nov 2022 11:15) (79 - 119)  BP: 152/72 (27 Nov 2022 21:00) (140/76 - 152/72)  BP(mean): 104 (27 Nov 2022 21:00) (103 - 104)  ABP: 167/50 (28 Nov 2022 11:15) (96/51 - 185/66)  ABP(mean): 100 (28 Nov 2022 11:15) (65 - 125)  RR: 14 (28 Nov 2022 11:15) (12 - 28)  SpO2: 94% (28 Nov 2022 11:15) (91% - 100%)    O2 Parameters below as of 28 Nov 2022 11:00  Patient On (Oxygen Delivery Method): nasal cannula  O2 Flow (L/min): 6          Physical Exam:  Gen: A&O   CNS: non focal 	  Neck: no JVD  RES : clear , no wheezing              CVS: Regular  rhythm. Normal S1/S2  Abd: Soft, non-distended. Bowel sounds present.  Skin: No rash.  Ext:  no edema    ============================I/O===========================   I&O's Detail    27 Nov 2022 07:01  -  28 Nov 2022 07:00  --------------------------------------------------------  IN:    Albumin 5%  - 250 mL: 500 mL    Enteral Tube Flush: 150 mL    Insulin: 51 mL    IV PiggyBack: 50 mL    IV PiggyBack: 50 mL    IV PiggyBack: 150 mL    IV PiggyBack: 500 mL    IV PiggyBack: 100 mL    IV PiggyBack: 300 mL    Lactated Ringers Bolus: 1000 mL    Milrinone: 40 mL    Milrinone: 112 mL    NiCARdipine: 12.5 mL    Norepinephrine: 3 mL    Oral Fluid: 60 mL    PRBCs (Packed Red Blood Cells): 314 mL    Propofol: 32 mL    sodium chloride 0.9%: 95 mL    sodium chloride 0.9%: 190 mL  Total IN: 3709.5 mL    OUT:    Chest Tube (mL): 320 mL    Chest Tube (mL): 310 mL    DOBUTamine: 0 mL    Indwelling Catheter - Urethral (mL): 1720 mL    Nasogastric/Oral tube (mL): 50 mL  Total OUT: 2400 mL    Total NET: 1309.5 mL      28 Nov 2022 07:01  -  28 Nov 2022 11:39  --------------------------------------------------------  IN:    Insulin: 10 mL    IV PiggyBack: 50 mL    IV PiggyBack: 100 mL    Milrinone: 40 mL    Oral Fluid: 300 mL    sodium chloride 0.9%: 50 mL    sodium chloride 0.9%: 25 mL  Total IN: 575 mL    OUT:    Chest Tube (mL): 90 mL    Chest Tube (mL): 70 mL    Indwelling Catheter - Urethral (mL): 380 mL    NiCARdipine: 0 mL  Total OUT: 540 mL    Total NET: 35 mL        ============================ LABS =========================                        9.4    10.94 )-----------( 99       ( 28 Nov 2022 02:00 )             26.3     11-28    141  |  106  |  19.1  ----------------------------<  101<H>  4.8   |  25.0  |  0.83    Ca    8.1<L>      28 Nov 2022 02:00  Mg     2.8     11-28    TPro  4.4<L>  /  Alb  3.0<L>  /  TBili  1.3  /  DBili  x   /  AST  62<H>  /  ALT  15  /  AlkPhos  35<L>  11-28    LIVER FUNCTIONS - ( 28 Nov 2022 02:00 )  Alb: 3.0 g/dL / Pro: 4.4 g/dL / ALK PHOS: 35 U/L / ALT: 15 U/L / AST: 62 U/L / GGT: x           PT/INR - ( 28 Nov 2022 02:00 )   PT: 14.8 sec;   INR: 1.27 ratio         PTT - ( 28 Nov 2022 02:00 )  PTT:27.1 sec  ABG - ( 28 Nov 2022 09:00 )  pH, Arterial: 7.450 pH, Blood: x     /  pCO2: 37    /  pO2: 71    / HCO3: 26    / Base Excess: 1.7   /  SaO2: 97.0                ======================Micro/Rad/Cardio=================  Culture: Reviewed   CXR: Reviewed  Echo:Reviewed  ======================================================  PAST MEDICAL & SURGICAL HISTORY:  Hypertension      Hyperlipidemia      Hypothyroid      Breast cancer      History of mitral valve prolapse      S/P mastectomy, left        ====================ASSESSMENT ==============  81yo F w/ PMHx of CAD, LBBB, HTN, HLD admitted to [facility] after experiencing acute onset chest pain, found to have NSTEMI and underwent cardiac cath that revealed triple vessel CAD, placed on Heparin gtt and loaded with Plavix.  TTE also confirmed severe MR. Patient transferred to [facility] ICU for surgical evaluation and medical management on 11/23. Had intraoperative IABP that was d/c'd 11/25 pt subsequently became bradycardic and hypotensive.  Urgently brought to cath labs and IABP reinserted in subsequent groin.  Pt transferred to CTICU Dr Ackerman service.  +NSTEMI.  since s.p C3L MVR(t) DEVONTE Clip came ou on primacor, levophed - briefly on dobutamine since d.c for tachycardia - currently remains on priamcor for cardiogenic shock +/- levophed for hypotension   post op also with acute blood loss anemia requiring blood, and acute hypoxic respiratory failure with high oxygen requirement on vent - since weaned off.  condition guarded     --- Triple vessel coronary artery disease.   ---s.p CABG   --- Severe mitral regurgitation by prior echocardiogram.   ---s.p MVR / CABG   --- HTN (hypertension).   ---HLD (hyperlipidemia).   --- Non-ST elevation MI (NSTEMI).   ---w/ IABP  in place   ---Post op Hypovolemia  ---Post op respiratory insufficiency       Plan:  -DC IABP later today  -currently requiring priamcor   -pain control prn   -diuresis when able.  -beta blocker when able.  -Continue statin      ====================== NEUROLOGY=====================  acetaminophen     Tablet .. 975 milliGRAM(s) Oral every 8 hours  oxyCODONE    IR 5 milliGRAM(s) Oral every 6 hours PRN Moderate Pain (4 - 6)  sertraline 100 milliGRAM(s) Oral daily    ==================== RESPIRATORY======================  Post op respiratory insufficiency    ====================CARDIOVASCULAR==================  Post op Hypovolemia  milrinone Infusion 0.408 MICROgram(s)/kG/Min (8 mL/Hr) IV Continuous <Continuous>  niCARdipine Infusion 5 mG/Hr (25 mL/Hr) IV Continuous <Continuous>    ===================HEMATOLOGIC/ONC ===================  Monitor H&H/Plts    aspirin enteric coated 81 milliGRAM(s) Oral daily  enoxaparin Injectable 40 milliGRAM(s) SubCutaneous every 24 hours    ===================== RENAL =========================  Continue monitoring urine output, I&OS, BUN/Cr     ==================== GASTROINTESTINAL===================  pantoprazole    Tablet 40 milliGRAM(s) Oral before breakfast  polyethylene glycol 3350 17 Gram(s) Oral daily  senna 2 Tablet(s) Oral at bedtime  sodium chloride 0.9%. 1000 milliLiter(s) (10 mL/Hr) IV Continuous <Continuous>  sodium chloride 0.9%. 1000 milliLiter(s) (5 mL/Hr) IV Continuous <Continuous>    =======================    ENDOCRINE  =====================  atorvastatin 80 milliGRAM(s) Oral at bedtime  insulin regular Infusion 2 Unit(s)/Hr (2 mL/Hr) IV Continuous <Continuous>  levothyroxine 112 MICROGram(s) Oral daily    ========================INFECTIOUS DISEASE================  cefuroxime  IVPB 1500 milliGRAM(s) IV Intermittent every 8 hours  vancomycin  IVPB 1000 milliGRAM(s) IV Intermittent every 12 hours      -Close hemodynamic , ventilatory and drain monitoring and management per post op routine .  -Monitor Neurologic status ,   -Head of the bed should remain elevated to 45 degrees,  -Monitor adequacy of oxygenation and ventilation and attempt to wean oxygen ,  -Monitor for arrhythmias and monitor parameters for organ perfusion,  -Glycemic control is satisfactory,  -Nutritional goals will be met using po eventually , insure adequate caloric intake and monitor the same ,  -Electrolytes have been repleted as necessary , pain control has been achieved  and wound care has been carried out ,  -Stress ulcer and VTE prophylaxis will be achieved,  -Agressive PT and early mobility and ambulation goals will be met,  -The family was updated about the course and plan .      I have spent 35 minutes providing acute care for this critically ill patient     Patient requires continuous monitoring with bedside rhythm monitoring, pulse ox monitoring, and intermittent blood gas analysis. Care plan discussed with ICU care team. Patient remained critical and at risk for life threatening decompensation.

## 2022-11-28 NOTE — CHART NOTE - NSCHARTNOTEFT_GEN_A_CORE
Removal of Intra-Aortic Balloon Pump Removal Note    The IABP (intra-aortic balloon pump) was weaned according to current practices. Hemodynamics remained stable. Pulses in the (left) lower extremity are (palpable). The patient was placed in the supine position. The insertion site was identified and the sutures were removed. The IABP was turned off and the balloon deflated. The IABP was then removed. Direct pressure was applied for _35__ minutes. A sandbag was applied and is to remain in place for three hours.    Monitoring of the (left) groin and both lower extremities including neuro-vascular checks and vital signs every 15 minutes x 4, then every 30 minutes x 2, then every 1 hour x 4 was ordered.    Complications: none

## 2022-11-28 NOTE — PROGRESS NOTE ADULT - SUBJECTIVE AND OBJECTIVE BOX
SUBJECTIVE   unable to offer in the setting of being intubated   able to nod head   without pain at this time     INTERIM HISTORY SIGNIFICANT FOR   remains on primacor and +/- levophed   with IABP in place   post op acute hypoxic resp failure req increasing fi02 req - since weaned off       Patient is a 82y old  Female who presents with a chief complaint of NSTEMI, CAD with chest pain (26 Nov 2022 11:40)    HPI:  81 yo female, PMHx of LBBB, HTN, HLD, mitral valve prolapse, hypothyroidism, breast CA 10 years ago s/p chemotherapy and left mastectomy, admitted to [facility] 11/22 after experiencing acute onset chest pain with radiation to back that onset the night prior. Ruled in for NSTEMI with positive troponin 430 --> 446. She underwent diagnostic cardiac catheterization with Dr. Garibay, which revealed triple vessel CAD, with severe LAD, LCx, and occlusion of RCA. Ventriculogram with EF 30% with concerns for severe MR. She was given ASA, Plavix load 600mg, and placed on Heparin gtt for ACS. An IABP and SGC were placed and she was transferred to [facility] MICU for further evaluation and medical management. Upon arrival to MICU, hemodynamics stable, ADBP >170 on 1:1. Patient was on 100% NRB weaned to 3lpm nasal cannula laying supine comfortably. Denies chest pain, dyspnea, palpitations, orthopnea, LE edema.  (23 Nov 2022 21:40)    OBJECTIVE  PAST MEDICAL & SURGICAL HISTORY:  Hypertension      Hyperlipidemia      Hypothyroid      Breast cancer      History of mitral valve prolapse      S/P mastectomy, left        iodinated radiocontrast agents (Vomiting; Headache)  No Known Allergies    Home Medications:    VITALS  Currently in sinus rhythm with vitals as below  ICU Vital Signs Last 24 Hrs  T(C): 36.7 (28 Nov 2022 04:00), Max: 37 (27 Nov 2022 21:30)  T(F): 98.1 (28 Nov 2022 04:00), Max: 98.6 (27 Nov 2022 21:30)  HR: 93 (28 Nov 2022 04:00) (79 - 119)  BP: 152/72 (27 Nov 2022 21:00) (111/58 - 152/72)  BP(mean): 104 (27 Nov 2022 21:00) (76 - 104)  ABP: 152/54 (28 Nov 2022 04:00) (96/51 - 163/45)  ABP(mean): 101 (28 Nov 2022 04:00) (65 - 106)  RR: 15 (28 Nov 2022 04:00) (12 - 24)  SpO2: 94% (28 Nov 2022 04:00) (92% - 100%)    O2 Parameters below as of 27 Nov 2022 23:00  Patient On (Oxygen Delivery Method): ventilator    O2 Concentration (%): 60      Adult Advanced Hemodynamics Last 24 Hrs  CVP(mm Hg): 14 (28 Nov 2022 04:00) (5 - 16)  CVP(cm H2O): --  CO: 4 (28 Nov 2022 04:00) (2.5 - 4.3)  CI: 2.4 (28 Nov 2022 04:00) (1.6 - 2.5)  PA: 45/14 (28 Nov 2022 04:00) (28/11 - 158/73)  PA(mean): 28 (28 Nov 2022 04:00) (16 - 93)  PCWP: --  SVR: 1737 (28 Nov 2022 04:00) (1084 - 2286)  SVRI: 2896 (28 Nov 2022 04:00) (1897 - 3901)  PVR: --  PVRI: --  LABS                        9.4    10.94 )-----------( 99       ( 28 Nov 2022 02:00 )             26.3   PT/INR - ( 28 Nov 2022 02:00 )   PT: 14.8 sec;   INR: 1.27 ratio         PTT - ( 28 Nov 2022 02:00 )  PTT:27.1 [id number]    141  |  106  |  19.1  ----------------------------<  101<H>  4.8   |  25.0  |  0.83    Ca    8.1<L>      28 Nov 2022 02:00  Mg     2.8     11-28    TPro  4.4<L>  /  Alb  3.0<L>  /  TBili  1.3  /  DBili  x   /  AST  62<H>  /  ALT  15  /  AlkPhos  35<L>  11-28  CAPILLARY BLOOD GLUCOSE      POCT Blood Glucose.: 122 mg/dL (28 Nov 2022 04:02)  CARDIAC MARKERS ( 28 Nov 2022 02:00 )  x     / 2.75 ng/mL / 425 U/L / x     / 23.6 ng/mL  CARDIAC MARKERS ( 27 Nov 2022 14:00 )  x     / 2.93 ng/mL / 373 U/L / x     / 27.6 ng/mL  CARDIAC MARKERS ( 26 Nov 2022 08:35 )  x     / 2.57 ng/mL / 344 U/L / x     / 38.0 ng/mL      ABG - ( 27 Nov 2022 23:25 )  pH, Arterial: 7.540 pH, Blood: x     /  pCO2: 30    /  pO2: 110   / HCO3: 26    / Base Excess: 3.2   /  SaO2: 99.6              Mode: CPAP with PS  FiO2: 50  PEEP: 5  PS: 8  MAP: 8    IN/OUT    11-26-22 @ 07:01  -  11-27-22 @ 07:00  --------------------------------------------------------  IN: 857.5 mL / OUT: 1545 mL / NET: -687.5 mL    11-27-22 @ 07:01  -  11-28-22 @ 04:22  --------------------------------------------------------  IN: 3562 mL / OUT: 1380 mL / NET: 2182 mL      IMAGING  personally reviewed imaging     CURRENT MEDICATIONS  MEDICATIONS  (STANDING):  acetaminophen     Tablet .. 975 milliGRAM(s) Oral every 8 hours  acetaminophen   IVPB .. 1000 milliGRAM(s) IV Intermittent once  aspirin enteric coated 81 milliGRAM(s) Oral daily  atorvastatin 80 milliGRAM(s) Oral at bedtime  cefuroxime  IVPB 1500 milliGRAM(s) IV Intermittent every 8 hours  chlorhexidine 0.12% Liquid 5 milliLiter(s) Oral Mucosa two times a day  chlorhexidine 0.12% Liquid 15 milliLiter(s) Oral Mucosa every 12 hours  enoxaparin Injectable 40 milliGRAM(s) SubCutaneous every 24 hours  insulin regular Infusion 2 Unit(s)/Hr (2 mL/Hr) IV Continuous <Continuous>  levothyroxine 112 MICROGram(s) Oral daily  lidocaine   4% Patch 1 Patch Transdermal daily  milrinone Infusion 0.408 MICROgram(s)/kG/Min (8 mL/Hr) IV Continuous <Continuous>  mupirocin 2% Nasal 1 Application(s) Both Nostrils two times a day  norepinephrine Infusion 0.05 MICROgram(s)/kG/Min (6.13 mL/Hr) IV Continuous <Continuous>  pantoprazole    Tablet 40 milliGRAM(s) Oral before breakfast  senna 2 Tablet(s) Oral at bedtime  sertraline 100 milliGRAM(s) Oral daily  sodium chloride 0.9%. 1000 milliLiter(s) (10 mL/Hr) IV Continuous <Continuous>  sodium chloride 0.9%. 1000 milliLiter(s) (5 mL/Hr) IV Continuous <Continuous>  vancomycin  IVPB 1000 milliGRAM(s) IV Intermittent every 12 hours    MEDICATIONS  (PRN):  oxyCODONE    IR 5 milliGRAM(s) Oral every 6 hours PRN Moderate Pain (4 - 6)

## 2022-11-29 DIAGNOSIS — Z29.9 ENCOUNTER FOR PROPHYLACTIC MEASURES, UNSPECIFIED: ICD-10-CM

## 2022-11-29 LAB
ANION GAP SERPL CALC-SCNC: 13 MMOL/L — SIGNIFICANT CHANGE UP (ref 5–17)
ANION GAP SERPL CALC-SCNC: 18 MMOL/L — HIGH (ref 5–17)
BUN SERPL-MCNC: 29.4 MG/DL — HIGH (ref 8–20)
BUN SERPL-MCNC: 36 MG/DL — HIGH (ref 8–20)
CALCIUM SERPL-MCNC: 8.1 MG/DL — LOW (ref 8.4–10.5)
CALCIUM SERPL-MCNC: 8.6 MG/DL — SIGNIFICANT CHANGE UP (ref 8.4–10.5)
CHLORIDE SERPL-SCNC: 104 MMOL/L — SIGNIFICANT CHANGE UP (ref 96–108)
CHLORIDE SERPL-SCNC: 105 MMOL/L — SIGNIFICANT CHANGE UP (ref 96–108)
CO2 SERPL-SCNC: 20 MMOL/L — LOW (ref 22–29)
CO2 SERPL-SCNC: 21 MMOL/L — LOW (ref 22–29)
CREAT SERPL-MCNC: 1.3 MG/DL — SIGNIFICANT CHANGE UP (ref 0.5–1.3)
CREAT SERPL-MCNC: 1.56 MG/DL — HIGH (ref 0.5–1.3)
EGFR: 33 ML/MIN/1.73M2 — LOW
EGFR: 41 ML/MIN/1.73M2 — LOW
GLUCOSE BLDC GLUCOMTR-MCNC: 144 MG/DL — HIGH (ref 70–99)
GLUCOSE BLDC GLUCOMTR-MCNC: 157 MG/DL — HIGH (ref 70–99)
GLUCOSE BLDC GLUCOMTR-MCNC: 169 MG/DL — HIGH (ref 70–99)
GLUCOSE BLDC GLUCOMTR-MCNC: 179 MG/DL — HIGH (ref 70–99)
GLUCOSE BLDC GLUCOMTR-MCNC: <30 MG/DL — CRITICAL LOW (ref 70–99)
GLUCOSE SERPL-MCNC: 146 MG/DL — HIGH (ref 70–99)
GLUCOSE SERPL-MCNC: 169 MG/DL — HIGH (ref 70–99)
HCT VFR BLD CALC: 26.8 % — LOW (ref 34.5–45)
HGB BLD-MCNC: 9.3 G/DL — LOW (ref 11.5–15.5)
MAGNESIUM SERPL-MCNC: 2.2 MG/DL — SIGNIFICANT CHANGE UP (ref 1.6–2.6)
MCHC RBC-ENTMCNC: 30.4 PG — SIGNIFICANT CHANGE UP (ref 27–34)
MCHC RBC-ENTMCNC: 34.7 GM/DL — SIGNIFICANT CHANGE UP (ref 32–36)
MCV RBC AUTO: 87.6 FL — SIGNIFICANT CHANGE UP (ref 80–100)
PLATELET # BLD AUTO: 111 K/UL — LOW (ref 150–400)
POTASSIUM SERPL-MCNC: 4 MMOL/L — SIGNIFICANT CHANGE UP (ref 3.5–5.3)
POTASSIUM SERPL-MCNC: 4.3 MMOL/L — SIGNIFICANT CHANGE UP (ref 3.5–5.3)
POTASSIUM SERPL-SCNC: 4 MMOL/L — SIGNIFICANT CHANGE UP (ref 3.5–5.3)
POTASSIUM SERPL-SCNC: 4.3 MMOL/L — SIGNIFICANT CHANGE UP (ref 3.5–5.3)
RBC # BLD: 3.06 M/UL — LOW (ref 3.8–5.2)
RBC # FLD: 15.9 % — HIGH (ref 10.3–14.5)
SODIUM SERPL-SCNC: 138 MMOL/L — SIGNIFICANT CHANGE UP (ref 135–145)
SODIUM SERPL-SCNC: 142 MMOL/L — SIGNIFICANT CHANGE UP (ref 135–145)
WBC # BLD: 16.69 K/UL — HIGH (ref 3.8–10.5)
WBC # FLD AUTO: 16.69 K/UL — HIGH (ref 3.8–10.5)

## 2022-11-29 PROCEDURE — 71045 X-RAY EXAM CHEST 1 VIEW: CPT | Mod: 26

## 2022-11-29 PROCEDURE — 99024 POSTOP FOLLOW-UP VISIT: CPT

## 2022-11-29 PROCEDURE — 99291 CRITICAL CARE FIRST HOUR: CPT

## 2022-11-29 PROCEDURE — 93010 ELECTROCARDIOGRAM REPORT: CPT

## 2022-11-29 RX ORDER — HEPARIN SODIUM 5000 [USP'U]/ML
5000 INJECTION INTRAVENOUS; SUBCUTANEOUS EVERY 12 HOURS
Refills: 0 | Status: DISCONTINUED | OUTPATIENT
Start: 2022-11-30 | End: 2022-12-04

## 2022-11-29 RX ORDER — AMIODARONE HYDROCHLORIDE 400 MG/1
150 TABLET ORAL ONCE
Refills: 0 | Status: COMPLETED | OUTPATIENT
Start: 2022-11-29 | End: 2022-11-29

## 2022-11-29 RX ORDER — NOREPINEPHRINE BITARTRATE/D5W 8 MG/250ML
0.05 PLASTIC BAG, INJECTION (ML) INTRAVENOUS
Qty: 8 | Refills: 0 | Status: DISCONTINUED | OUTPATIENT
Start: 2022-11-29 | End: 2022-11-29

## 2022-11-29 RX ORDER — POTASSIUM CHLORIDE 20 MEQ
40 PACKET (EA) ORAL ONCE
Refills: 0 | Status: COMPLETED | OUTPATIENT
Start: 2022-11-29 | End: 2022-11-29

## 2022-11-29 RX ORDER — ALBUMIN HUMAN 25 %
250 VIAL (ML) INTRAVENOUS ONCE
Refills: 0 | Status: COMPLETED | OUTPATIENT
Start: 2022-11-29 | End: 2022-11-29

## 2022-11-29 RX ORDER — FUROSEMIDE 40 MG
40 TABLET ORAL ONCE
Refills: 0 | Status: COMPLETED | OUTPATIENT
Start: 2022-11-29 | End: 2022-11-29

## 2022-11-29 RX ORDER — ASCORBIC ACID 60 MG
500 TABLET,CHEWABLE ORAL DAILY
Refills: 0 | Status: DISCONTINUED | OUTPATIENT
Start: 2022-11-29 | End: 2022-12-01

## 2022-11-29 RX ORDER — DIPHENHYDRAMINE HCL 50 MG
25 CAPSULE ORAL ONCE
Refills: 0 | Status: COMPLETED | OUTPATIENT
Start: 2022-11-29 | End: 2022-11-29

## 2022-11-29 RX ORDER — ACETAMINOPHEN 500 MG
1000 TABLET ORAL ONCE
Refills: 0 | Status: COMPLETED | OUTPATIENT
Start: 2022-11-29 | End: 2022-11-29

## 2022-11-29 RX ORDER — DIPHENHYDRAMINE HCL 50 MG
25 CAPSULE ORAL EVERY 4 HOURS
Refills: 0 | Status: DISCONTINUED | OUTPATIENT
Start: 2022-11-29 | End: 2022-12-06

## 2022-11-29 RX ORDER — SODIUM CHLORIDE 9 MG/ML
500 INJECTION, SOLUTION INTRAVENOUS ONCE
Refills: 0 | Status: COMPLETED | OUTPATIENT
Start: 2022-11-29 | End: 2022-11-29

## 2022-11-29 RX ORDER — NOREPINEPHRINE BITARTRATE/D5W 8 MG/250ML
0.05 PLASTIC BAG, INJECTION (ML) INTRAVENOUS
Qty: 8 | Refills: 0 | Status: DISCONTINUED | OUTPATIENT
Start: 2022-11-29 | End: 2022-11-30

## 2022-11-29 RX ORDER — MAGNESIUM SULFATE 500 MG/ML
2 VIAL (ML) INJECTION ONCE
Refills: 0 | Status: COMPLETED | OUTPATIENT
Start: 2022-11-29 | End: 2022-11-29

## 2022-11-29 RX ORDER — FERROUS SULFATE 325(65) MG
325 TABLET ORAL DAILY
Refills: 0 | Status: DISCONTINUED | OUTPATIENT
Start: 2022-11-29 | End: 2022-12-01

## 2022-11-29 RX ORDER — FOLIC ACID 0.8 MG
1 TABLET ORAL DAILY
Refills: 0 | Status: DISCONTINUED | OUTPATIENT
Start: 2022-11-29 | End: 2022-12-01

## 2022-11-29 RX ADMIN — SODIUM CHLORIDE 100 MILLILITER(S): 9 INJECTION, SOLUTION INTRAVENOUS at 13:31

## 2022-11-29 RX ADMIN — Medication 112 MICROGRAM(S): at 05:58

## 2022-11-29 RX ADMIN — Medication 81 MILLIGRAM(S): at 11:14

## 2022-11-29 RX ADMIN — Medication 975 MILLIGRAM(S): at 21:38

## 2022-11-29 RX ADMIN — ENOXAPARIN SODIUM 40 MILLIGRAM(S): 100 INJECTION SUBCUTANEOUS at 05:58

## 2022-11-29 RX ADMIN — Medication 400 MILLIGRAM(S): at 10:33

## 2022-11-29 RX ADMIN — SENNA PLUS 2 TABLET(S): 8.6 TABLET ORAL at 21:38

## 2022-11-29 RX ADMIN — Medication 2: at 16:10

## 2022-11-29 RX ADMIN — MILRINONE LACTATE 5.89 MICROGRAM(S)/KG/MIN: 1 INJECTION, SOLUTION INTRAVENOUS at 10:33

## 2022-11-29 RX ADMIN — Medication 1000 MILLIGRAM(S): at 11:19

## 2022-11-29 RX ADMIN — PANTOPRAZOLE SODIUM 40 MILLIGRAM(S): 20 TABLET, DELAYED RELEASE ORAL at 07:06

## 2022-11-29 RX ADMIN — Medication 250 MILLIGRAM(S): at 06:07

## 2022-11-29 RX ADMIN — OXYCODONE HYDROCHLORIDE 5 MILLIGRAM(S): 5 TABLET ORAL at 05:56

## 2022-11-29 RX ADMIN — Medication 2: at 11:13

## 2022-11-29 RX ADMIN — CHLORHEXIDINE GLUCONATE 1 APPLICATION(S): 213 SOLUTION TOPICAL at 05:58

## 2022-11-29 RX ADMIN — Medication 40 MILLIGRAM(S): at 18:22

## 2022-11-29 RX ADMIN — Medication 125 MILLILITER(S): at 09:34

## 2022-11-29 RX ADMIN — Medication 2: at 08:12

## 2022-11-29 RX ADMIN — MUPIROCIN 1 APPLICATION(S): 20 OINTMENT TOPICAL at 22:27

## 2022-11-29 RX ADMIN — Medication 25 MILLIGRAM(S): at 18:22

## 2022-11-29 RX ADMIN — AMIODARONE HYDROCHLORIDE 618 MILLIGRAM(S): 400 TABLET ORAL at 10:00

## 2022-11-29 RX ADMIN — Medication 25 GRAM(S): at 21:13

## 2022-11-29 RX ADMIN — LIDOCAINE 1 PATCH: 4 CREAM TOPICAL at 00:30

## 2022-11-29 RX ADMIN — ATORVASTATIN CALCIUM 80 MILLIGRAM(S): 80 TABLET, FILM COATED ORAL at 21:39

## 2022-11-29 RX ADMIN — OXYCODONE HYDROCHLORIDE 5 MILLIGRAM(S): 5 TABLET ORAL at 21:20

## 2022-11-29 RX ADMIN — Medication 40 MILLIEQUIVALENT(S): at 19:54

## 2022-11-29 RX ADMIN — OXYCODONE HYDROCHLORIDE 5 MILLIGRAM(S): 5 TABLET ORAL at 06:30

## 2022-11-29 RX ADMIN — MUPIROCIN 1 APPLICATION(S): 20 OINTMENT TOPICAL at 05:58

## 2022-11-29 RX ADMIN — OXYCODONE HYDROCHLORIDE 5 MILLIGRAM(S): 5 TABLET ORAL at 21:40

## 2022-11-29 RX ADMIN — Medication 100 MILLIGRAM(S): at 00:29

## 2022-11-29 NOTE — PROGRESS NOTE ADULT - SUBJECTIVE AND OBJECTIVE BOX
PRAVEEN GONZALEZ  MRN-[id number]    HPI:  81 yo female, PMHx of LBBB, HTN, HLD, mitral valve prolapse, hypothyroidism, breast CA 10 years ago s/p chemotherapy and left mastectomy, admitted to [facility] 11/22 after experiencing acute onset chest pain with radiation to back that onset the night prior. Ruled in for NSTEMI with positive troponin 430 --> 446. She underwent diagnostic cardiac catheterization with Dr. Garibay, which revealed triple vessel CAD, with severe LAD, LCx, and occlusion of RCA. Ventriculogram with EF 30% with concerns for severe MR. She was given ASA, Plavix load 600mg, and placed on Heparin gtt for ACS. An IABP and SGC were placed and she was transferred to [facility] MICU for further evaluation and medical management. Upon arrival to MICU, hemodynamics stable, ADBP >170 on 1:1. Patient was on 100% NRB weaned to 3lpm nasal cannula laying supine comfortably. Denies chest pain, dyspnea, palpitations, orthopnea, LE edema.  (23 Nov 2022 21:40)      Surgery/Hospital Course:  ·  PRE-OP DIAGNOSIS:  CAD (coronary artery disease)   Severe mitral insufficiency   Non-ST elevation MI (NSTEMI)   ·  POST-OP DIAGNOSIS:  CAD (coronary artery disease)  Severe mitral insufficiency  NSTEMI (non-ST elevation myocardial infarction)  ·  PROCEDURES:  CABG, with saphenous vein graft 27-Nov-2022   C3L (LIMA-LAD, SVG-OM1, SVG-OM2)   Clipping, left atrial appendage ,  45 V Clip   Mitral valve replacement , Epic Plus Valve   Today:  No acute events  IABP removed yesterday  decrease  Primacor-  CVP  8-5  CI 2.0  Sleepy this am  Start ASA   DC [place] later today      ICU Vital Signs Last 24 Hrs  T(C): 36.4 (29 Nov 2022 07:00), Max: 36.6 (29 Nov 2022 01:00)  T(F): 97.5 (29 Nov 2022 07:00), Max: 97.9 (29 Nov 2022 01:00)  HR: 85 (29 Nov 2022 11:00) (85 - 103)  BP: --  BP(mean): --  ABP: 139/46 (29 Nov 2022 11:00) (109/47 - 162/54)  ABP(mean): 71 (29 Nov 2022 11:00) (60 - 100)  RR: 16 (29 Nov 2022 11:00) (11 - 38)  SpO2: 99% (29 Nov 2022 11:00) (90% - 100%)    O2 Parameters below as of 29 Nov 2022 11:00  Patient On (Oxygen Delivery Method): nasal cannula  O2 Flow (L/min): 2          Physical Exam:  Gen: A&O   CNS: non focal 	  Neck: no JVD  RES : clear , no wheezing              CVS: Regular  rhythm. Normal S1/S2  Abd: Soft, non-distended. Bowel sounds present.  Skin: No rash.  Ext:  no edema    ============================I/O===========================   I&O's Detail    28 Nov 2022 07:01  -  29 Nov 2022 07:00  --------------------------------------------------------  IN:    Insulin: 14 mL    IV PiggyBack: 100 mL    IV PiggyBack: 100 mL    IV PiggyBack: 250 mL    Milrinone: 184 mL    NiCARdipine: 60 mL    Oral Fluid: 550 mL    PRBCs (Packed Red Blood Cells): 325 mL    sodium chloride 0.9%: 115 mL    sodium chloride 0.9%: 230 mL  Total IN: 1928 mL    OUT:    Chest Tube (mL): 180 mL    Chest Tube (mL): 430 mL    Indwelling Catheter - Urethral (mL): 1230 mL  Total OUT: 1840 mL    Total NET: 88 mL      29 Nov 2022 07:01  -  29 Nov 2022 11:51  --------------------------------------------------------  IN:    IV PiggyBack: 100 mL    IV PiggyBack: 100 mL    Milrinone: 8 mL    Milrinone: 4 mL    Milrinone: 11.8 mL    sodium chloride 0.9%: 20 mL    sodium chloride 0.9%: 40 mL  Total IN: 283.8 mL    OUT:    Chest Tube (mL): 30 mL    Chest Tube (mL): 20 mL    Indwelling Catheter - Urethral (mL): 85 mL    NiCARdipine: 0 mL  Total OUT: 135 mL    Total NET: 148.8 mL        ============================ LABS =========================                        9.3    16.69 )-----------( 111      ( 29 Nov 2022 02:04 )             26.8     11-29    142  |  104  |  29.4<H>  ----------------------------<  146<H>  4.3   |  20.0<L>  |  1.30    Ca    8.6      29 Nov 2022 02:04  Mg     2.2     11-29    TPro  4.4<L>  /  Alb  3.0<L>  /  TBili  1.3  /  DBili  x   /  AST  62<H>  /  ALT  15  /  AlkPhos  35<L>  11-28    LIVER FUNCTIONS - ( 28 Nov 2022 02:00 )  Alb: 3.0 g/dL / Pro: 4.4 g/dL / ALK PHOS: 35 U/L / ALT: 15 U/L / AST: 62 U/L / GGT: x           PT/INR - ( 28 Nov 2022 02:00 )   PT: 14.8 sec;   INR: 1.27 ratio         PTT - ( 28 Nov 2022 02:00 )  PTT:27.1 sec  ABG - ( 28 Nov 2022 09:00 )  pH, Arterial: 7.450 pH, Blood: x     /  pCO2: 37    /  pO2: 71    / HCO3: 26    / Base Excess: 1.7   /  SaO2: 97.0                ======================Micro/Rad/Cardio=================  Culture: Reviewed   CXR: Reviewed  Echo:Reviewed  ======================================================  PAST MEDICAL & SURGICAL HISTORY:  Hypertension      Hyperlipidemia      Hypothyroid      Breast cancer      History of mitral valve prolapse      S/P mastectomy, left        ====================ASSESSMENT ==============  83yo F w/ PMHx of CAD, LBBB, HTN, HLD admitted to [facility] after experiencing acute onset chest pain, found to have NSTEMI and underwent cardiac cath that revealed triple vessel CAD, placed on Heparin gtt and loaded with Plavix.  TTE also confirmed severe MR. Patient transferred to [facility] ICU for surgical evaluation and medical management on 11/23. Had intraoperative IABP that was d/c'd 11/25 pt subsequently became bradycardic and hypotensive.  Urgently brought to cath labs and IABP reinserted in subsequent groin.  Pt transferred to CTICU Dr Ackerman service.  +NSTEMI.  since s.p C3L MVR(t) DEVONTE Clip came ou on primacor, levophed - briefly on dobutamine since d.c for tachycardia - currently remains on priamcor for cardiogenic shock +/- levophed for hypotension   post op also with acute blood loss anemia requiring blood, and acute hypoxic respiratory failure with high oxygen requirement on vent - since weaned off.  condition guarded     --- Triple vessel coronary artery disease.   ---s.p CABG   --- Severe mitral regurgitation by prior echocardiogram.   ---s.p MVR / CABG   --- HTN (hypertension).   ---HLD (hyperlipidemia).   --- Non-ST elevation MI (NSTEMI).   ---Post op Hypovolemia  ---Post op respiratory insufficiency       Plan:  -currently requiring lower dose of  priamcor   -pain control prn   -diuresis when able.  -beta blocker when able.  -Continue statin      ====================== NEUROLOGY=====================  acetaminophen     Tablet .. 975 milliGRAM(s) Oral every 8 hours  oxyCODONE    IR 5 milliGRAM(s) Oral every 6 hours PRN Moderate Pain (4 - 6)  sertraline 100 milliGRAM(s) Oral daily    ==================== RESPIRATORY======================  Post op respiratory insufficiency    ====================CARDIOVASCULAR==================  Post op Hypovolemia  milrinone Infusion 0.3 MICROgram(s)/kG/Min (5.89 mL/Hr) IV Continuous <Continuous>  niCARdipine Infusion 5 mG/Hr (25 mL/Hr) IV Continuous <Continuous>    ===================HEMATOLOGIC/ONC ===================  Monitor H&H/Plts    aspirin enteric coated 81 milliGRAM(s) Oral daily    ===================== RENAL =========================  Continue monitoring urine output, I&OS, BUN/Cr     ==================== GASTROINTESTINAL===================  ascorbic acid 500 milliGRAM(s) Oral daily  dextrose 10% Bolus 250 milliLiter(s) IV Bolus once  dextrose 5%. 1000 milliLiter(s) (50 mL/Hr) IV Continuous <Continuous>  ferrous    sulfate 325 milliGRAM(s) Oral daily  folic acid 1 milliGRAM(s) Oral daily  pantoprazole    Tablet 40 milliGRAM(s) Oral before breakfast  polyethylene glycol 3350 17 Gram(s) Oral daily  senna 2 Tablet(s) Oral at bedtime  sodium chloride 0.9%. 1000 milliLiter(s) (10 mL/Hr) IV Continuous <Continuous>  sodium chloride 0.9%. 1000 milliLiter(s) (5 mL/Hr) IV Continuous <Continuous>    =======================    ENDOCRINE  =====================  atorvastatin 80 milliGRAM(s) Oral at bedtime  dextrose Oral Gel 15 Gram(s) Oral once PRN Blood Glucose LESS THAN 70 milliGRAM(s)/deciliter  insulin lispro (ADMELOG) corrective regimen sliding scale   SubCutaneous Before meals and at bedtime  levothyroxine 112 MICROGram(s) Oral daily    ========================INFECTIOUS DISEASE================      -Close hemodynamic , ventilatory and drain monitoring and management per post op routine .  -Monitor Neurologic status ,   -Head of the bed should remain elevated to 45 degrees,  -Monitor adequacy of oxygenation and ventilation and attempt to wean oxygen ,  -Monitor for arrhythmias and monitor parameters for organ perfusion,  -Glycemic control is satisfactory,  -Nutritional goals will be met using po eventually , insure adequate caloric intake and monitor the same ,  -Electrolytes have been repleted as necessary , pain control has been achieved  and wound care has been carried out ,  -Stress ulcer and VTE prophylaxis will be achieved,  -Agressive PT and early mobility and ambulation goals will be met,  -The family was updated about the course and plan .      I have spent 35 minutes providing acute care for this critically ill patient     Patient requires continuous monitoring with bedside rhythm monitoring, pulse ox monitoring, and intermittent blood gas analysis. Care plan discussed with ICU care team. Patient remained critical and at risk for life threatening decompensation.

## 2022-11-29 NOTE — PROGRESS NOTE ADULT - SUBJECTIVE AND OBJECTIVE BOX
Brief summary:  82y Female POD# 2 C3L, MVR DEVONTE clip     SUBJECTIVE:  Pt in bed laying flat s/p IABP removal. Pt states, " I am just so sleepy"  Pt c/o of Pain only when taking in a deep breath, pt currently denies shortness of breath, palpitations, headache, dizziness, nausea, or vomiting.      Overnight events:  none    PAST MEDICAL & SURGICAL HISTORY:  Hypertension      Hyperlipidemia      Hypothyroid      Breast cancer      History of mitral valve prolapse      S/P mastectomy, left          MEDICATIONS  acetaminophen     Tablet .. 975 milliGRAM(s) Oral every 8 hours  aspirin enteric coated 81 milliGRAM(s) Oral daily  atorvastatin 80 milliGRAM(s) Oral at bedtime  chlorhexidine 2% Cloths 1 Application(s) Topical <User Schedule>  dextrose 10% Bolus 250 milliLiter(s) IV Bolus once  dextrose 5%. 1000 milliLiter(s) IV Continuous <Continuous>  dextrose Oral Gel 15 Gram(s) Oral once PRN  enoxaparin Injectable 40 milliGRAM(s) SubCutaneous every 24 hours  insulin lispro (ADMELOG) corrective regimen sliding scale   SubCutaneous Before meals and at bedtime  levothyroxine 112 MICROGram(s) Oral daily  lidocaine   4% Patch 1 Patch Transdermal daily  milrinone Infusion 0.408 MICROgram(s)/kG/Min IV Continuous <Continuous>  mupirocin 2% Nasal 1 Application(s) Both Nostrils two times a day  niCARdipine Infusion 5 mG/Hr IV Continuous <Continuous>  oxyCODONE    IR 5 milliGRAM(s) Oral every 6 hours PRN  pantoprazole    Tablet 40 milliGRAM(s) Oral before breakfast  polyethylene glycol 3350 17 Gram(s) Oral daily  senna 2 Tablet(s) Oral at bedtime  sertraline 100 milliGRAM(s) Oral daily  sodium chloride 0.9%. 1000 milliLiter(s) IV Continuous <Continuous>  sodium chloride 0.9%. 1000 milliLiter(s) IV Continuous <Continuous>  vancomycin  IVPB 1000 milliGRAM(s) IV Intermittent every 12 hours  MEDICATIONS  (PRN):  dextrose Oral Gel 15 Gram(s) Oral once PRN Blood Glucose LESS THAN 70 milliGRAM(s)/deciliter  oxyCODONE    IR 5 milliGRAM(s) Oral every 6 hours PRN Moderate Pain (4 - 6)    Mode: CPAP with PS, FiO2: 50, PEEP: 5, PS: 8, MAP: 8  Daily     Daily       ABG - ( 28 Nov 2022 09:00 )  pH, Arterial: 7.450 pH, Blood: x     /  pCO2: 37    /  pO2: 71    / HCO3: 26    / Base Excess: 1.7   /  SaO2: 97.0                                    9.0    18.53 )-----------( 133      ( 28 Nov 2022 16:20 )             25.3   11-28    141  |  106  |  19.1  ----------------------------<  101<H>  4.8   |  25.0  |  0.83    Ca    8.1<L>      28 Nov 2022 02:00  Mg     2.8     11-28    TPro  4.4<L>  /  Alb  3.0<L>  /  TBili  1.3  /  DBili  x   /  AST  62<H>  /  ALT  15  /  AlkPhos  35<L>  11-28    CARDIAC MARKERS ( 28 Nov 2022 02:00 )  x     / 2.75 ng/mL / 425 U/L / x     / 23.6 ng/mL  CARDIAC MARKERS ( 27 Nov 2022 14:00 )  x     / 2.93 ng/mL / 373 U/L / x     / 27.6 ng/mL    PT/INR - ( 28 Nov 2022 02:00 )   PT: 14.8 sec;   INR: 1.27 ratio         PTT - ( 28 Nov 2022 02:00 )  PTT:27.1 sec      Objective:  T(C): 36.5 (11-29-22 @ 00:00), Max: 36.9 (11-28-22 @ 01:00)  HR: 100 (11-29-22 @ 00:00) (90 - 100)  BP: --  RR: 25 (11-29-22 @ 00:00) (11 - 38)  SpO2: 92% (11-29-22 @ 00:00) (90% - 99%)  Wt(kg): --CAPILLARY BLOOD GLUCOSE      POCT Blood Glucose.: 140 mg/dL (28 Nov 2022 21:43)  POCT Blood Glucose.: 109 mg/dL (28 Nov 2022 12:50)  POCT Blood Glucose.: 118 mg/dL (28 Nov 2022 10:59)  POCT Blood Glucose.: 125 mg/dL (28 Nov 2022 09:05)  POCT Blood Glucose.: 129 mg/dL (28 Nov 2022 07:49)  POCT Blood Glucose.: 145 mg/dL (28 Nov 2022 06:50)  POCT Blood Glucose.: 151 mg/dL (28 Nov 2022 05:57)  POCT Blood Glucose.: 146 mg/dL (28 Nov 2022 05:11)  POCT Blood Glucose.: 122 mg/dL (28 Nov 2022 04:02)  POCT Blood Glucose.: 112 mg/dL (28 Nov 2022 03:30)  POCT Blood Glucose.: 99 mg/dL (28 Nov 2022 03:03)  POCT Blood Glucose.: 106 mg/dL (28 Nov 2022 02:04)  POCT Blood Glucose.: 115 mg/dL (28 Nov 2022 01:04)  I&O's Summary    27 Nov 2022 07:01  -  28 Nov 2022 07:00  --------------------------------------------------------  IN: 3709.5 mL / OUT: 2400 mL / NET: 1309.5 mL    28 Nov 2022 07:01  -  29 Nov 2022 00:57  --------------------------------------------------------  IN: 1367 mL / OUT: 1445 mL / NET: -78 mL        Physical Exam  Neuro: A+O x 3, non-focal, speech clear and intact  HEENT:  NCAT, PERRL, EOMI. No conjuctival edema or icterus, no thrush.  Neck: supple, trachea midline  Pulm: Diminished b/l at the bases, no rales/rhonchi/wheezing, no accessory muscle use noted  CV: regular rate, regular rhythm, +S1S2, no murmur or rub noted  Abd: soft, NT, ND, + BS  Ext: MOSHER x 4, trace edema, B/L UE ecchymosis and RUE ?petechia noted vs rash, L groin C/D/I/soft/no hematoma. 2+ DP b/l, distal motor/neuro/circ intact.   Inc: MSI C/D/I/stable w/ dressing, Left VH C/D/I with Ace wrap  Chest tubes: 1 med and 1 Left   +PW 45/10/.8  threshold 2.0    Imaging:  CXR:  < from: Xray Chest 1 View- PORTABLE-Routine (11.28.22 @ 05:56) >  Portable chest 11/28/2022 at 5:15 AM    FINDINGS: ET tube and NG tube removed. The other lines and catheters are   relatively unchanged in position. Nopneumothorax or pulmonary venous   congestion. Small bilateral pleural effusions and mild atelectasis at the   bases. Heart size normal.    IMPRESSION: Small bilateral pleural effusions and atelectasis at the   bases. No pulmonary venous congestion or pneumothorax.    --- End of Report ---            COOKIE LENZ DO; Attending Radiologist  This document has been electronically signed. Nov 28 2022 11:30    < end of copied text >                     Brief summary:  82y Female POD# 2 C3L, MVR DEVONTE clip     SUBJECTIVE:  Pt in bed laying flat s/p IABP removal. Pt states, " I am just so sleepy"  Pt c/o of Pain only when taking in a deep breath, pt currently denies shortness of breath, palpitations, headache, dizziness, nausea, or vomiting.      Overnight events:  none    PAST MEDICAL & SURGICAL HISTORY:  Hypertension      Hyperlipidemia      Hypothyroid      Breast cancer      History of mitral valve prolapse      S/P mastectomy, left          MEDICATIONS  acetaminophen     Tablet .. 975 milliGRAM(s) Oral every 8 hours  aspirin enteric coated 81 milliGRAM(s) Oral daily  atorvastatin 80 milliGRAM(s) Oral at bedtime  chlorhexidine 2% Cloths 1 Application(s) Topical <User Schedule>  dextrose 10% Bolus 250 milliLiter(s) IV Bolus once  dextrose 5%. 1000 milliLiter(s) IV Continuous <Continuous>  dextrose Oral Gel 15 Gram(s) Oral once PRN  enoxaparin Injectable 40 milliGRAM(s) SubCutaneous every 24 hours  insulin lispro (ADMELOG) corrective regimen sliding scale   SubCutaneous Before meals and at bedtime  levothyroxine 112 MICROGram(s) Oral daily  lidocaine   4% Patch 1 Patch Transdermal daily  milrinone Infusion 0.408 MICROgram(s)/kG/Min IV Continuous <Continuous>  mupirocin 2% Nasal 1 Application(s) Both Nostrils two times a day  niCARdipine Infusion 5 mG/Hr IV Continuous <Continuous>  oxyCODONE    IR 5 milliGRAM(s) Oral every 6 hours PRN  pantoprazole    Tablet 40 milliGRAM(s) Oral before breakfast  polyethylene glycol 3350 17 Gram(s) Oral daily  senna 2 Tablet(s) Oral at bedtime  sertraline 100 milliGRAM(s) Oral daily  sodium chloride 0.9%. 1000 milliLiter(s) IV Continuous <Continuous>  sodium chloride 0.9%. 1000 milliLiter(s) IV Continuous <Continuous>  vancomycin  IVPB 1000 milliGRAM(s) IV Intermittent every 12 hours  MEDICATIONS  (PRN):  dextrose Oral Gel 15 Gram(s) Oral once PRN Blood Glucose LESS THAN 70 milliGRAM(s)/deciliter  oxyCODONE    IR 5 milliGRAM(s) Oral every 6 hours PRN Moderate Pain (4 - 6)    Mode: CPAP with PS, FiO2: 50, PEEP: 5, PS: 8, MAP: 8  Daily     Daily       ABG - ( 28 Nov 2022 09:00 )  pH, Arterial: 7.450 pH, Blood: x     /  pCO2: 37    /  pO2: 71    / HCO3: 26    / Base Excess: 1.7   /  SaO2: 97.0                                    9.0    18.53 )-----------( 133      ( 28 Nov 2022 16:20 )             25.3   11-28    141  |  106  |  19.1  ----------------------------<  101<H>  4.8   |  25.0  |  0.83    Ca    8.1<L>      28 Nov 2022 02:00  Mg     2.8     11-28    TPro  4.4<L>  /  Alb  3.0<L>  /  TBili  1.3  /  DBili  x   /  AST  62<H>  /  ALT  15  /  AlkPhos  35<L>  11-28    CARDIAC MARKERS ( 28 Nov 2022 02:00 )  x     / 2.75 ng/mL / 425 U/L / x     / 23.6 ng/mL  CARDIAC MARKERS ( 27 Nov 2022 14:00 )  x     / 2.93 ng/mL / 373 U/L / x     / 27.6 ng/mL    PT/INR - ( 28 Nov 2022 02:00 )   PT: 14.8 sec;   INR: 1.27 ratio         PTT - ( 28 Nov 2022 02:00 )  PTT:27.1 sec      Objective:  T(C): 36.5 (11-29-22 @ 00:00), Max: 36.9 (11-28-22 @ 01:00)  HR: 100 (11-29-22 @ 00:00) (90 - 100)  BP: --  RR: 25 (11-29-22 @ 00:00) (11 - 38)  SpO2: 92% (11-29-22 @ 00:00) (90% - 99%)  Wt(kg): --CAPILLARY BLOOD GLUCOSE      POCT Blood Glucose.: 140 mg/dL (28 Nov 2022 21:43)  POCT Blood Glucose.: 109 mg/dL (28 Nov 2022 12:50)  POCT Blood Glucose.: 118 mg/dL (28 Nov 2022 10:59)  POCT Blood Glucose.: 125 mg/dL (28 Nov 2022 09:05)  POCT Blood Glucose.: 129 mg/dL (28 Nov 2022 07:49)  POCT Blood Glucose.: 145 mg/dL (28 Nov 2022 06:50)  POCT Blood Glucose.: 151 mg/dL (28 Nov 2022 05:57)  POCT Blood Glucose.: 146 mg/dL (28 Nov 2022 05:11)  POCT Blood Glucose.: 122 mg/dL (28 Nov 2022 04:02)  POCT Blood Glucose.: 112 mg/dL (28 Nov 2022 03:30)  POCT Blood Glucose.: 99 mg/dL (28 Nov 2022 03:03)  POCT Blood Glucose.: 106 mg/dL (28 Nov 2022 02:04)  POCT Blood Glucose.: 115 mg/dL (28 Nov 2022 01:04)  I&O's Summary    27 Nov 2022 07:01  -  28 Nov 2022 07:00  --------------------------------------------------------  IN: 3709.5 mL / OUT: 2400 mL / NET: 1309.5 mL    28 Nov 2022 07:01  -  29 Nov 2022 00:57  --------------------------------------------------------  IN: 1367 mL / OUT: 1445 mL / NET: -78 mL        Physical Exam  Neuro: A+O x 3, non-focal, speech clear and intact  HEENT:  NCAT, PERRL, EOMI. No conjuctival edema or icterus, no thrush.  Neck: supple, trachea midline  Pulm: Diminished b/l at the bases, no rales/rhonchi/wheezing, no accessory muscle use noted  CV: regular rate, regular rhythm, +S1S2, no murmur or rub noted  Abd: soft, NT, ND, + BS  Ext: MOSHER x 4, trace edema, B/L UE ecchymosis and RUE ?petechia noted vs rash, L groin C/D/I/soft/no hematoma. 2+ DP b/l, distal motor/neuro/circ intact.   Inc: MSI C/D/I/stable w/ dressing, Left VH C/D/I with Ace wrap   : +marrufo   Chest tubes: 1 med and 1 Left   +PW 45/10/.8  threshold 2.0  Lines: RIght RAL, RIJ    Imaging:  CXR:  < from: Xray Chest 1 View- PORTABLE-Routine (11.28.22 @ 05:56) >  Portable chest 11/28/2022 at 5:15 AM    FINDINGS: ET tube and NG tube removed. The other lines and catheters are   relatively unchanged in position. Nopneumothorax or pulmonary venous   congestion. Small bilateral pleural effusions and mild atelectasis at the   bases. Heart size normal.    IMPRESSION: Small bilateral pleural effusions and atelectasis at the   bases. No pulmonary venous congestion or pneumothorax.    --- End of Report ---            COOKIE LENZ DO; Attending Radiologist  This document has been electronically signed. Nov 28 2022 11:30    < end of copied text >

## 2022-11-30 LAB
ANION GAP SERPL CALC-SCNC: 13 MMOL/L — SIGNIFICANT CHANGE UP (ref 5–17)
ANION GAP SERPL CALC-SCNC: 13 MMOL/L — SIGNIFICANT CHANGE UP (ref 5–17)
APTT BLD: 25.1 SEC — LOW (ref 27.5–35.5)
BUN SERPL-MCNC: 38 MG/DL — HIGH (ref 8–20)
BUN SERPL-MCNC: 41.9 MG/DL — HIGH (ref 8–20)
CALCIUM SERPL-MCNC: 8.2 MG/DL — LOW (ref 8.4–10.5)
CALCIUM SERPL-MCNC: 8.3 MG/DL — LOW (ref 8.4–10.5)
CHLORIDE SERPL-SCNC: 102 MMOL/L — SIGNIFICANT CHANGE UP (ref 96–108)
CHLORIDE SERPL-SCNC: 104 MMOL/L — SIGNIFICANT CHANGE UP (ref 96–108)
CO2 SERPL-SCNC: 22 MMOL/L — SIGNIFICANT CHANGE UP (ref 22–29)
CO2 SERPL-SCNC: 22 MMOL/L — SIGNIFICANT CHANGE UP (ref 22–29)
CREAT SERPL-MCNC: 1.58 MG/DL — HIGH (ref 0.5–1.3)
CREAT SERPL-MCNC: 1.73 MG/DL — HIGH (ref 0.5–1.3)
EGFR: 29 ML/MIN/1.73M2 — LOW
EGFR: 32 ML/MIN/1.73M2 — LOW
GLUCOSE BLDC GLUCOMTR-MCNC: 126 MG/DL — HIGH (ref 70–99)
GLUCOSE BLDC GLUCOMTR-MCNC: 149 MG/DL — HIGH (ref 70–99)
GLUCOSE BLDC GLUCOMTR-MCNC: 150 MG/DL — HIGH (ref 70–99)
GLUCOSE BLDC GLUCOMTR-MCNC: 161 MG/DL — HIGH (ref 70–99)
GLUCOSE SERPL-MCNC: 132 MG/DL — HIGH (ref 70–99)
GLUCOSE SERPL-MCNC: 139 MG/DL — HIGH (ref 70–99)
HCT VFR BLD CALC: 25.2 % — LOW (ref 34.5–45)
HGB BLD-MCNC: 8.5 G/DL — LOW (ref 11.5–15.5)
INR BLD: 1.13 RATIO — SIGNIFICANT CHANGE UP (ref 0.88–1.16)
MAGNESIUM SERPL-MCNC: 2.9 MG/DL — HIGH (ref 1.6–2.6)
MCHC RBC-ENTMCNC: 30.1 PG — SIGNIFICANT CHANGE UP (ref 27–34)
MCHC RBC-ENTMCNC: 33.7 GM/DL — SIGNIFICANT CHANGE UP (ref 32–36)
MCV RBC AUTO: 89.4 FL — SIGNIFICANT CHANGE UP (ref 80–100)
PHOSPHATE SERPL-MCNC: 3.1 MG/DL — SIGNIFICANT CHANGE UP (ref 2.4–4.7)
PLATELET # BLD AUTO: 99 K/UL — LOW (ref 150–400)
POTASSIUM SERPL-MCNC: 4.3 MMOL/L — SIGNIFICANT CHANGE UP (ref 3.5–5.3)
POTASSIUM SERPL-MCNC: 4.3 MMOL/L — SIGNIFICANT CHANGE UP (ref 3.5–5.3)
POTASSIUM SERPL-SCNC: 4.3 MMOL/L — SIGNIFICANT CHANGE UP (ref 3.5–5.3)
POTASSIUM SERPL-SCNC: 4.3 MMOL/L — SIGNIFICANT CHANGE UP (ref 3.5–5.3)
PROTHROM AB SERPL-ACNC: 13.1 SEC — SIGNIFICANT CHANGE UP (ref 10.5–13.4)
RBC # BLD: 2.82 M/UL — LOW (ref 3.8–5.2)
RBC # FLD: 16.7 % — HIGH (ref 10.3–14.5)
SODIUM SERPL-SCNC: 137 MMOL/L — SIGNIFICANT CHANGE UP (ref 135–145)
SODIUM SERPL-SCNC: 138 MMOL/L — SIGNIFICANT CHANGE UP (ref 135–145)
WBC # BLD: 18.78 K/UL — HIGH (ref 3.8–10.5)
WBC # FLD AUTO: 18.78 K/UL — HIGH (ref 3.8–10.5)

## 2022-11-30 PROCEDURE — 93010 ELECTROCARDIOGRAM REPORT: CPT

## 2022-11-30 PROCEDURE — 71045 X-RAY EXAM CHEST 1 VIEW: CPT | Mod: 26

## 2022-11-30 PROCEDURE — 99291 CRITICAL CARE FIRST HOUR: CPT

## 2022-11-30 RX ORDER — METOPROLOL TARTRATE 50 MG
25 TABLET ORAL
Refills: 0 | Status: DISCONTINUED | OUTPATIENT
Start: 2022-11-30 | End: 2022-12-01

## 2022-11-30 RX ORDER — FUROSEMIDE 40 MG
40 TABLET ORAL
Refills: 0 | Status: DISCONTINUED | OUTPATIENT
Start: 2022-11-30 | End: 2022-12-01

## 2022-11-30 RX ORDER — FUROSEMIDE 40 MG
40 TABLET ORAL DAILY
Refills: 0 | Status: DISCONTINUED | OUTPATIENT
Start: 2022-11-30 | End: 2022-11-30

## 2022-11-30 RX ORDER — ACETAMINOPHEN 500 MG
1000 TABLET ORAL ONCE
Refills: 0 | Status: COMPLETED | OUTPATIENT
Start: 2022-11-30 | End: 2022-11-30

## 2022-11-30 RX ORDER — METOPROLOL TARTRATE 50 MG
2.5 TABLET ORAL ONCE
Refills: 0 | Status: COMPLETED | OUTPATIENT
Start: 2022-11-30 | End: 2022-11-30

## 2022-11-30 RX ADMIN — PANTOPRAZOLE SODIUM 40 MILLIGRAM(S): 20 TABLET, DELAYED RELEASE ORAL at 06:56

## 2022-11-30 RX ADMIN — OXYCODONE HYDROCHLORIDE 5 MILLIGRAM(S): 5 TABLET ORAL at 10:15

## 2022-11-30 RX ADMIN — MUPIROCIN 1 APPLICATION(S): 20 OINTMENT TOPICAL at 17:01

## 2022-11-30 RX ADMIN — OXYCODONE HYDROCHLORIDE 5 MILLIGRAM(S): 5 TABLET ORAL at 11:15

## 2022-11-30 RX ADMIN — SENNA PLUS 2 TABLET(S): 8.6 TABLET ORAL at 21:33

## 2022-11-30 RX ADMIN — ATORVASTATIN CALCIUM 80 MILLIGRAM(S): 80 TABLET, FILM COATED ORAL at 21:33

## 2022-11-30 RX ADMIN — Medication 25 MILLIGRAM(S): at 21:33

## 2022-11-30 RX ADMIN — POLYETHYLENE GLYCOL 3350 17 GRAM(S): 17 POWDER, FOR SOLUTION ORAL at 11:47

## 2022-11-30 RX ADMIN — Medication 81 MILLIGRAM(S): at 11:47

## 2022-11-30 RX ADMIN — MILRINONE LACTATE 3.92 MICROGRAM(S)/KG/MIN: 1 INJECTION, SOLUTION INTRAVENOUS at 05:16

## 2022-11-30 RX ADMIN — Medication 400 MILLIGRAM(S): at 10:05

## 2022-11-30 RX ADMIN — LIDOCAINE 1 PATCH: 4 CREAM TOPICAL at 17:13

## 2022-11-30 RX ADMIN — CHLORHEXIDINE GLUCONATE 1 APPLICATION(S): 213 SOLUTION TOPICAL at 05:15

## 2022-11-30 RX ADMIN — Medication 25 MILLIGRAM(S): at 17:00

## 2022-11-30 RX ADMIN — HEPARIN SODIUM 5000 UNIT(S): 5000 INJECTION INTRAVENOUS; SUBCUTANEOUS at 17:00

## 2022-11-30 RX ADMIN — Medication 1000 MILLIGRAM(S): at 10:20

## 2022-11-30 RX ADMIN — HEPARIN SODIUM 5000 UNIT(S): 5000 INJECTION INTRAVENOUS; SUBCUTANEOUS at 05:16

## 2022-11-30 RX ADMIN — Medication 500 MILLIGRAM(S): at 11:47

## 2022-11-30 RX ADMIN — Medication 2.5 MILLIGRAM(S): at 10:05

## 2022-11-30 RX ADMIN — Medication 1 MILLIGRAM(S): at 11:47

## 2022-11-30 RX ADMIN — Medication 40 MILLIGRAM(S): at 09:36

## 2022-11-30 RX ADMIN — LIDOCAINE 1 PATCH: 4 CREAM TOPICAL at 11:47

## 2022-11-30 RX ADMIN — MUPIROCIN 1 APPLICATION(S): 20 OINTMENT TOPICAL at 05:16

## 2022-11-30 RX ADMIN — Medication 25 MILLIGRAM(S): at 15:23

## 2022-11-30 RX ADMIN — Medication 40 MILLIGRAM(S): at 15:23

## 2022-11-30 RX ADMIN — LIDOCAINE 1 PATCH: 4 CREAM TOPICAL at 23:27

## 2022-11-30 RX ADMIN — Medication 2: at 11:49

## 2022-11-30 RX ADMIN — SERTRALINE 100 MILLIGRAM(S): 25 TABLET, FILM COATED ORAL at 11:48

## 2022-11-30 RX ADMIN — Medication 112 MICROGRAM(S): at 05:16

## 2022-11-30 RX ADMIN — Medication 325 MILLIGRAM(S): at 11:47

## 2022-11-30 NOTE — CHART NOTE - NSCHARTNOTEFT_GEN_A_CORE
Source: Patient [x ]  Family [ ]   other [x ] EMR and staff     Current Diet: Diet, Regular:   Consistent Carbohydrate {Evening Snack} (CSTCHOSN)  DASH/TLC {Sodium & Cholesterol Restricted} (DASH) (11-28-22 @ 15:20)    PO intake:  < 50% [x ]   50-75%  [ ]   %  [ ]  other :    Source for PO intake [x ] Patient [ ] family [ ] chart [ ] staff [ ] other    Current Weight:   11/23: 65.4 kg   (Generalized edema, 2+ edema to B/L arms)     % Weight Change: No new weight to assess at this time.     Pertinent Medications: MEDICATIONS  (STANDING):  ascorbic acid 500 milliGRAM(s) Oral daily  aspirin enteric coated 81 milliGRAM(s) Oral daily  atorvastatin 80 milliGRAM(s) Oral at bedtime  chlorhexidine 2% Cloths 1 Application(s) Topical <User Schedule>  dextrose 10% Bolus 250 milliLiter(s) IV Bolus once  dextrose 5%. 1000 milliLiter(s) (50 mL/Hr) IV Continuous <Continuous>  ferrous    sulfate 325 milliGRAM(s) Oral daily  folic acid 1 milliGRAM(s) Oral daily  furosemide   Injectable 40 milliGRAM(s) IV Push daily  heparin   Injectable 5000 Unit(s) SubCutaneous every 12 hours  insulin lispro (ADMELOG) corrective regimen sliding scale   SubCutaneous Before meals and at bedtime  levothyroxine 112 MICROGram(s) Oral daily  lidocaine   4% Patch 1 Patch Transdermal daily  mupirocin 2% Nasal 1 Application(s) Both Nostrils two times a day  pantoprazole    Tablet 40 milliGRAM(s) Oral before breakfast  polyethylene glycol 3350 17 Gram(s) Oral daily  senna 2 Tablet(s) Oral at bedtime  sertraline 100 milliGRAM(s) Oral daily  sodium chloride 0.9%. 1000 milliLiter(s) (10 mL/Hr) IV Continuous <Continuous>  sodium chloride 0.9%. 1000 milliLiter(s) (5 mL/Hr) IV Continuous <Continuous>    MEDICATIONS  (PRN):  dextrose Oral Gel 15 Gram(s) Oral once PRN Blood Glucose LESS THAN 70 milliGRAM(s)/deciliter  diphenhydrAMINE 25 milliGRAM(s) Oral every 4 hours PRN Rash and/or Itching  oxyCODONE    IR 5 milliGRAM(s) Oral every 6 hours PRN Moderate Pain (4 - 6)    Pertinent Labs: CBC Full  -  ( 30 Nov 2022 02:00 )  WBC Count : 18.78 K/uL  RBC Count : 2.82 M/uL  Hemoglobin : 8.5 g/dL  Hematocrit : 25.2 %  Platelet Count - Automated : 99 K/uL  Mean Cell Volume : 89.4 fl  Mean Cell Hemoglobin : 30.1 pg  Mean Cell Hemoglobin Concentration : 33.7 gm/dL  Auto Neutrophil # : x  Auto Lymphocyte # : x  Auto Monocyte # : x  Auto Eosinophil # : x  Auto Basophil # : x  Auto Neutrophil % : x  Auto Lymphocyte % : x  Auto Monocyte % : x  Auto Eosinophil % : x  Auto Basophil % : x        Skin: Midsternal incision, skin tears     Nutrition focused physical exam-previoulsy conducted - found signs of malnutrition [ ]absent [x ]present    Subcutaneous fat loss: Mod [x ] Orbital fat pads region, [x ]Buccal fat region, [x ]Triceps region,  [ ]Ribs region    Muscle wasting: Mod [ x]Temples region, [x ]Clavicle region, [x ]Shoulder region, [ ]Scapula region, [ ]Interosseous region,  [ ]thigh region, [ ]Calf region    Estimated Needs:   [x ] no change since previous assessment  [ ] recalculated:     Current Nutrition Diagnosis:  Pt remains at high nutrition risk secondary to Moderate (chronic) protein calorie malnutrition related to inability to meet sufficient protein energy requirements with altered GI function in setting of Hiatal hernia and now s/p C3L, MVR DEVONTE clip   as evidenced by meeting < 75% estimated energy needs > 1 month, NFPE and 10lb(6.5%) weight loss. Pt reports having a very poor appetite and eating very little. Encouraged HBV protein foods. Pt agreeable to trying Ensure supplements. Recommendations below:       Recommendations:   1. RX: MVI and Vit. C daily (500mg).   2. Check weight daily to monitor trends   3. Encourage with meals.   4. Ensure TID     Monitoring and Evaluation:   [x ] PO intake [x ] Tolerance to diet prescription [X] Weights  [X] Follow up per protocol [X] Labs:

## 2022-11-30 NOTE — PROGRESS NOTE ADULT - SUBJECTIVE AND OBJECTIVE BOX
Brief summary:  82y Female POD# 3 C3L, MVR DEVONTE clip with Tyron Zuniga    SUBJECTIVE:  Pt in bed sleeping easily arousable pt states, "im cold and itchy"  pt complains of pain when taking in a deep breath, pt also c/o itchiness on upper arms, pt denies palpitations, active chest pain, SOB, lightheadedness and dizziness"     Overnight events:  none    PAST MEDICAL & SURGICAL HISTORY:  Hypertension      Hyperlipidemia      Hypothyroid      Breast cancer      History of mitral valve prolapse      S/P mastectomy, left          MEDICATIONS  ascorbic acid 500 milliGRAM(s) Oral daily  aspirin enteric coated 81 milliGRAM(s) Oral daily  atorvastatin 80 milliGRAM(s) Oral at bedtime  chlorhexidine 2% Cloths 1 Application(s) Topical <User Schedule>  dextrose 10% Bolus 250 milliLiter(s) IV Bolus once  dextrose 5%. 1000 milliLiter(s) IV Continuous <Continuous>  dextrose Oral Gel 15 Gram(s) Oral once PRN  diphenhydrAMINE 25 milliGRAM(s) Oral every 4 hours PRN  ferrous    sulfate 325 milliGRAM(s) Oral daily  folic acid 1 milliGRAM(s) Oral daily  heparin   Injectable 5000 Unit(s) SubCutaneous every 12 hours  insulin lispro (ADMELOG) corrective regimen sliding scale   SubCutaneous Before meals and at bedtime  levothyroxine 112 MICROGram(s) Oral daily  lidocaine   4% Patch 1 Patch Transdermal daily  milrinone Infusion 0.3 MICROgram(s)/kG/Min IV Continuous <Continuous>  mupirocin 2% Nasal 1 Application(s) Both Nostrils two times a day  niCARdipine Infusion 5 mG/Hr IV Continuous <Continuous>  norepinephrine Infusion 0.05 MICROgram(s)/kG/Min IV Continuous <Continuous>  oxyCODONE    IR 5 milliGRAM(s) Oral every 6 hours PRN  pantoprazole    Tablet 40 milliGRAM(s) Oral before breakfast  polyethylene glycol 3350 17 Gram(s) Oral daily  senna 2 Tablet(s) Oral at bedtime  sertraline 100 milliGRAM(s) Oral daily  sodium chloride 0.9%. 1000 milliLiter(s) IV Continuous <Continuous>  sodium chloride 0.9%. 1000 milliLiter(s) IV Continuous <Continuous>  MEDICATIONS  (PRN):  dextrose Oral Gel 15 Gram(s) Oral once PRN Blood Glucose LESS THAN 70 milliGRAM(s)/deciliter  diphenhydrAMINE 25 milliGRAM(s) Oral every 4 hours PRN Rash and/or Itching  oxyCODONE    IR 5 milliGRAM(s) Oral every 6 hours PRN Moderate Pain (4 - 6)      Daily     Daily       ABG - ( 28 Nov 2022 09:00 )  pH, Arterial: 7.450 pH, Blood: x     /  pCO2: 37    /  pO2: 71    / HCO3: 26    / Base Excess: 1.7   /  SaO2: 97.0                                    9.3    16.69 )-----------( 111      ( 29 Nov 2022 02:04 )             26.8   11-29    138  |  105  |  36.0<H>  ----------------------------<  169<H>  4.0   |  21.0<L>  |  1.56<H>    Ca    8.1<L>      29 Nov 2022 15:15  Mg     2.2     11-29    TPro  4.4<L>  /  Alb  3.0<L>  /  TBili  1.3  /  DBili  x   /  AST  62<H>  /  ALT  15  /  AlkPhos  35<L>  11-28    CARDIAC MARKERS ( 28 Nov 2022 02:00 )  x     / 2.75 ng/mL / 425 U/L / x     / 23.6 ng/mL    PT/INR - ( 28 Nov 2022 02:00 )   PT: 14.8 sec;   INR: 1.27 ratio         PTT - ( 28 Nov 2022 02:00 )  PTT:27.1 sec      Objective:  T(C): 36.9 (11-30-22 @ 00:01), Max: 37.6 (11-29-22 @ 16:01)  HR: 92 (11-30-22 @ 01:00) (85 - 103)  BP: 114/56 (11-30-22 @ 01:00) (107/53 - 157/70)  RR: 12 (11-30-22 @ 01:00) (11 - 27)  SpO2: 97% (11-30-22 @ 01:00) (94% - 100%)  Wt(kg): --CAPILLARY BLOOD GLUCOSE      POCT Blood Glucose.: 144 mg/dL (29 Nov 2022 21:38)  POCT Blood Glucose.: <30 mg/dL (29 Nov 2022 21:35)  POCT Blood Glucose.: 169 mg/dL (29 Nov 2022 15:15)  POCT Blood Glucose.: 179 mg/dL (29 Nov 2022 10:40)  POCT Blood Glucose.: 157 mg/dL (29 Nov 2022 08:05)  I&O's Summary    28 Nov 2022 07:01  -  29 Nov 2022 07:00  --------------------------------------------------------  IN: 1928 mL / OUT: 1840 mL / NET: 88 mL    29 Nov 2022 07:01  -  30 Nov 2022 01:26  --------------------------------------------------------  IN: 1672.2 mL / OUT: 1155 mL / NET: 517.2 mL        Physical Exam  Neuro: A+O x 3, non-focal, speech clear and intact  HEENT:  NCAT, PERRL, mid upper lip swelling   Neck: supple, trachea midline  Pulm: Diminished b/l at the bases, no rales/rhonchi/wheezing, no accessory muscle use noted  CV: irregularly irregular,  +S1S2, no murmur or rub noted  Abd: soft, NT, ND, + BS  Ext: MOSHER x 4, trace edema, B/L UE ecchymosis and b/l UE and right LE petechia noted/ rash, L groin C/D/I/soft/no hematoma. 2+ DP b/l, distal motor/neuro/circ intact.   Inc: MSI C/D/I/stable w/ dressing, Left VH C/D/I with Ace wrap  Chest tubes: 1 med and 1 Left   +PW 45/10/.8  threshold 2.0    Imaging:  CXR:    < from: Xray Chest 1 View- PORTABLE-Routine (11.29.22 @ 06:39) >    IMPRESSION:  Right IJ approach Schuyler-Sha catheterwith tip in the left   main pulmonary artery. Mediastinal drain and left chest tube unchanged in   position.    Small loculated right pleural effusion, larger.    New patchy left basilar/retrocardiac opacity, possibly subsegmental   atelectasis although pneumonia is not excluded in the appropriate   clinical context. In addition, a small left pleural effusion may also be   present    --- End of Report ---            HAYLIE NORRIS MD; Attending Radiologist  This document has been electronically signed. Nov 29 2022  4:59PM    < end of copied text >

## 2022-12-01 DIAGNOSIS — N17.9 ACUTE KIDNEY FAILURE, UNSPECIFIED: ICD-10-CM

## 2022-12-01 LAB
ALBUMIN SERPL ELPH-MCNC: 2.9 G/DL — LOW (ref 3.3–5.2)
ALBUMIN, RANDOM URINE: 3.2 MG/DL — SIGNIFICANT CHANGE UP
ALBUMIN/CREATININE RATIO (ACR): 76 MG/G — HIGH (ref 0–30)
ALP SERPL-CCNC: 150 U/L — HIGH (ref 40–120)
ALT FLD-CCNC: 31 U/L — SIGNIFICANT CHANGE UP
AMYLASE P1 CFR SERPL: 21 U/L — LOW (ref 36–128)
ANION GAP SERPL CALC-SCNC: 13 MMOL/L — SIGNIFICANT CHANGE UP (ref 5–17)
APPEARANCE UR: CLEAR — SIGNIFICANT CHANGE UP
APTT BLD: 25.2 SEC — LOW (ref 27.5–35.5)
AST SERPL-CCNC: 34 U/L — HIGH
BACTERIA # UR AUTO: ABNORMAL
BILIRUB DIRECT SERPL-MCNC: 0.3 MG/DL — SIGNIFICANT CHANGE UP (ref 0–0.3)
BILIRUB INDIRECT FLD-MCNC: 0.5 MG/DL — SIGNIFICANT CHANGE UP (ref 0.2–1)
BILIRUB SERPL-MCNC: 0.9 MG/DL — SIGNIFICANT CHANGE UP (ref 0.4–2)
BILIRUB UR-MCNC: NEGATIVE — SIGNIFICANT CHANGE UP
BUN SERPL-MCNC: 44.6 MG/DL — HIGH (ref 8–20)
CALCIUM SERPL-MCNC: 8 MG/DL — LOW (ref 8.4–10.5)
CHLORIDE SERPL-SCNC: 105 MMOL/L — SIGNIFICANT CHANGE UP (ref 96–108)
CO2 SERPL-SCNC: 22 MMOL/L — SIGNIFICANT CHANGE UP (ref 22–29)
COLOR SPEC: YELLOW — SIGNIFICANT CHANGE UP
CREAT ?TM UR-MCNC: 42 MG/DL — SIGNIFICANT CHANGE UP
CREAT ?TM UR-MCNC: 47 MG/DL — SIGNIFICANT CHANGE UP
CREAT SERPL-MCNC: 1.59 MG/DL — HIGH (ref 0.5–1.3)
DIFF PNL FLD: ABNORMAL
EGFR: 32 ML/MIN/1.73M2 — LOW
EPI CELLS # UR: SIGNIFICANT CHANGE UP
GLUCOSE BLDC GLUCOMTR-MCNC: 114 MG/DL — HIGH (ref 70–99)
GLUCOSE SERPL-MCNC: 106 MG/DL — HIGH (ref 70–99)
GLUCOSE UR QL: NEGATIVE MG/DL — SIGNIFICANT CHANGE UP
HCT VFR BLD CALC: 25.7 % — LOW (ref 34.5–45)
HGB BLD-MCNC: 8.5 G/DL — LOW (ref 11.5–15.5)
INR BLD: 1.14 RATIO — SIGNIFICANT CHANGE UP (ref 0.88–1.16)
KETONES UR-MCNC: NEGATIVE — SIGNIFICANT CHANGE UP
LACTATE SERPL-SCNC: 1 MMOL/L — SIGNIFICANT CHANGE UP (ref 0.5–2)
LEUKOCYTE ESTERASE UR-ACNC: NEGATIVE — SIGNIFICANT CHANGE UP
LIDOCAIN IGE QN: 12 U/L — LOW (ref 22–51)
MAGNESIUM SERPL-MCNC: 2.4 MG/DL — SIGNIFICANT CHANGE UP (ref 1.6–2.6)
MCHC RBC-ENTMCNC: 30.2 PG — SIGNIFICANT CHANGE UP (ref 27–34)
MCHC RBC-ENTMCNC: 33.1 GM/DL — SIGNIFICANT CHANGE UP (ref 32–36)
MCV RBC AUTO: 91.5 FL — SIGNIFICANT CHANGE UP (ref 80–100)
NITRITE UR-MCNC: NEGATIVE — SIGNIFICANT CHANGE UP
PH UR: 5 — SIGNIFICANT CHANGE UP (ref 5–8)
PLATELET # BLD AUTO: 101 K/UL — LOW (ref 150–400)
POTASSIUM SERPL-MCNC: 3.9 MMOL/L — SIGNIFICANT CHANGE UP (ref 3.5–5.3)
POTASSIUM SERPL-SCNC: 3.9 MMOL/L — SIGNIFICANT CHANGE UP (ref 3.5–5.3)
PROT ?TM UR-MCNC: 18 MG/DL — HIGH (ref 0–12)
PROT SERPL-MCNC: 4.9 G/DL — LOW (ref 6.6–8.7)
PROT UR-MCNC: NEGATIVE — SIGNIFICANT CHANGE UP
PROT/CREAT UR-RTO: 0.4 RATIO — HIGH
PROTHROM AB SERPL-ACNC: 13.2 SEC — SIGNIFICANT CHANGE UP (ref 10.5–13.4)
RBC # BLD: 2.81 M/UL — LOW (ref 3.8–5.2)
RBC # FLD: 16.8 % — HIGH (ref 10.3–14.5)
RBC CASTS # UR COMP ASSIST: SIGNIFICANT CHANGE UP /HPF (ref 0–4)
SODIUM SERPL-SCNC: 139 MMOL/L — SIGNIFICANT CHANGE UP (ref 135–145)
SP GR SPEC: 1.01 — SIGNIFICANT CHANGE UP (ref 1.01–1.02)
UROBILINOGEN FLD QL: NEGATIVE MG/DL — SIGNIFICANT CHANGE UP
WBC # BLD: 16.28 K/UL — HIGH (ref 3.8–10.5)
WBC # FLD AUTO: 16.28 K/UL — HIGH (ref 3.8–10.5)
WBC UR QL: SIGNIFICANT CHANGE UP /HPF (ref 0–5)

## 2022-12-01 PROCEDURE — 99222 1ST HOSP IP/OBS MODERATE 55: CPT

## 2022-12-01 PROCEDURE — 99024 POSTOP FOLLOW-UP VISIT: CPT

## 2022-12-01 PROCEDURE — 93010 ELECTROCARDIOGRAM REPORT: CPT

## 2022-12-01 PROCEDURE — 99223 1ST HOSP IP/OBS HIGH 75: CPT

## 2022-12-01 PROCEDURE — 99291 CRITICAL CARE FIRST HOUR: CPT | Mod: 25

## 2022-12-01 PROCEDURE — 71045 X-RAY EXAM CHEST 1 VIEW: CPT | Mod: 26

## 2022-12-01 RX ORDER — METOPROLOL TARTRATE 50 MG
50 TABLET ORAL
Refills: 0 | Status: DISCONTINUED | OUTPATIENT
Start: 2022-12-01 | End: 2022-12-06

## 2022-12-01 RX ORDER — ACETAMINOPHEN 500 MG
650 TABLET ORAL EVERY 6 HOURS
Refills: 0 | Status: DISCONTINUED | OUTPATIENT
Start: 2022-12-02 | End: 2022-12-06

## 2022-12-01 RX ORDER — FOLIC ACID 0.8 MG
1 TABLET ORAL DAILY
Refills: 0 | Status: COMPLETED | OUTPATIENT
Start: 2022-12-01 | End: 2022-12-06

## 2022-12-01 RX ORDER — SODIUM CHLORIDE 9 MG/ML
3 INJECTION INTRAMUSCULAR; INTRAVENOUS; SUBCUTANEOUS EVERY 8 HOURS
Refills: 0 | Status: DISCONTINUED | OUTPATIENT
Start: 2022-12-01 | End: 2022-12-06

## 2022-12-01 RX ORDER — FERROUS SULFATE 325(65) MG
325 TABLET ORAL DAILY
Refills: 0 | Status: DISCONTINUED | OUTPATIENT
Start: 2022-12-01 | End: 2022-12-06

## 2022-12-01 RX ORDER — ASCORBIC ACID 60 MG
500 TABLET,CHEWABLE ORAL DAILY
Refills: 0 | Status: DISCONTINUED | OUTPATIENT
Start: 2022-12-01 | End: 2022-12-06

## 2022-12-01 RX ORDER — ACETAMINOPHEN 500 MG
1000 TABLET ORAL ONCE
Refills: 0 | Status: COMPLETED | OUTPATIENT
Start: 2022-12-01 | End: 2022-12-01

## 2022-12-01 RX ORDER — FUROSEMIDE 40 MG
40 TABLET ORAL DAILY
Refills: 0 | Status: DISCONTINUED | OUTPATIENT
Start: 2022-12-01 | End: 2022-12-06

## 2022-12-01 RX ORDER — POTASSIUM CHLORIDE 20 MEQ
40 PACKET (EA) ORAL ONCE
Refills: 0 | Status: COMPLETED | OUTPATIENT
Start: 2022-12-01 | End: 2022-12-01

## 2022-12-01 RX ADMIN — LIDOCAINE 1 PATCH: 4 CREAM TOPICAL at 19:15

## 2022-12-01 RX ADMIN — OXYCODONE HYDROCHLORIDE 5 MILLIGRAM(S): 5 TABLET ORAL at 21:30

## 2022-12-01 RX ADMIN — SERTRALINE 100 MILLIGRAM(S): 25 TABLET, FILM COATED ORAL at 13:05

## 2022-12-01 RX ADMIN — CHLORHEXIDINE GLUCONATE 1 APPLICATION(S): 213 SOLUTION TOPICAL at 05:04

## 2022-12-01 RX ADMIN — Medication 112 MICROGRAM(S): at 05:03

## 2022-12-01 RX ADMIN — Medication 40 MILLIEQUIVALENT(S): at 04:43

## 2022-12-01 RX ADMIN — Medication 40 MILLIGRAM(S): at 05:04

## 2022-12-01 RX ADMIN — ATORVASTATIN CALCIUM 80 MILLIGRAM(S): 80 TABLET, FILM COATED ORAL at 21:33

## 2022-12-01 RX ADMIN — Medication 325 MILLIGRAM(S): at 11:02

## 2022-12-01 RX ADMIN — Medication 25 MILLIGRAM(S): at 05:04

## 2022-12-01 RX ADMIN — Medication 400 MILLIGRAM(S): at 18:24

## 2022-12-01 RX ADMIN — OXYCODONE HYDROCHLORIDE 5 MILLIGRAM(S): 5 TABLET ORAL at 04:45

## 2022-12-01 RX ADMIN — HEPARIN SODIUM 5000 UNIT(S): 5000 INJECTION INTRAVENOUS; SUBCUTANEOUS at 21:31

## 2022-12-01 RX ADMIN — Medication 1 MILLIGRAM(S): at 11:02

## 2022-12-01 RX ADMIN — MUPIROCIN 1 APPLICATION(S): 20 OINTMENT TOPICAL at 21:32

## 2022-12-01 RX ADMIN — HEPARIN SODIUM 5000 UNIT(S): 5000 INJECTION INTRAVENOUS; SUBCUTANEOUS at 05:04

## 2022-12-01 RX ADMIN — POLYETHYLENE GLYCOL 3350 17 GRAM(S): 17 POWDER, FOR SOLUTION ORAL at 11:03

## 2022-12-01 RX ADMIN — Medication 50 MILLIGRAM(S): at 18:26

## 2022-12-01 RX ADMIN — OXYCODONE HYDROCHLORIDE 5 MILLIGRAM(S): 5 TABLET ORAL at 22:00

## 2022-12-01 RX ADMIN — OXYCODONE HYDROCHLORIDE 5 MILLIGRAM(S): 5 TABLET ORAL at 05:15

## 2022-12-01 RX ADMIN — Medication 81 MILLIGRAM(S): at 11:02

## 2022-12-01 RX ADMIN — OXYCODONE HYDROCHLORIDE 5 MILLIGRAM(S): 5 TABLET ORAL at 14:28

## 2022-12-01 RX ADMIN — LIDOCAINE 1 PATCH: 4 CREAM TOPICAL at 11:03

## 2022-12-01 RX ADMIN — SODIUM CHLORIDE 3 MILLILITER(S): 9 INJECTION INTRAMUSCULAR; INTRAVENOUS; SUBCUTANEOUS at 21:45

## 2022-12-01 RX ADMIN — SENNA PLUS 2 TABLET(S): 8.6 TABLET ORAL at 21:31

## 2022-12-01 RX ADMIN — Medication 500 MILLIGRAM(S): at 11:02

## 2022-12-01 RX ADMIN — LIDOCAINE 1 PATCH: 4 CREAM TOPICAL at 23:05

## 2022-12-01 RX ADMIN — Medication 40 MILLIGRAM(S): at 13:05

## 2022-12-01 RX ADMIN — MUPIROCIN 1 APPLICATION(S): 20 OINTMENT TOPICAL at 05:04

## 2022-12-01 NOTE — PROGRESS NOTE ADULT - SUBJECTIVE AND OBJECTIVE BOX
PRAVEEN GONZALEZ  MRN-[id number]    HPI:  83 yo female, PMHx of LBBB, HTN, HLD, mitral valve prolapse, hypothyroidism, breast CA 10 years ago s/p chemotherapy and left mastectomy, admitted to [facility]  after experiencing acute onset chest pain with radiation to back that onset the night prior. Ruled in for NSTEMI with positive troponin 430 --> 446. She underwent diagnostic cardiac catheterization with Dr. Garibay, which revealed triple vessel CAD, with severe LAD, LCx, and occlusion of RCA. Ventriculogram with EF 30% with concerns for severe MR. She was given ASA, Plavix load 600mg, and placed on Heparin gtt for ACS. An IABP and SGC were placed and she was transferred to [facility] MICU for further evaluation and medical management. Upon arrival to MICU, hemodynamics stable, ADBP >170 on 1:1. Patient was on 100% NRB weaned to 3lpm nasal cannula laying supine comfortably. Denies chest pain, dyspnea, palpitations, orthopnea, LE edema.  (2022 21:40)    Surgery/Hospital Course:  ·  PRE-OP DIAGNOSIS:  CAD (coronary artery disease)   Severe mitral insufficiency   Non-ST elevation MI (NSTEMI)   ·  POST-OP DIAGNOSIS:  CAD (coronary artery disease)  Severe mitral insufficiency  NSTEMI (non-ST elevation myocardial infarction)  ·  PROCEDURES:  CABG, with saphenous vein graft 2022   C3L (LIMA-LAD, SVG-OM1, SVG-OM2)   Clipping, left atrial appendage ,  45 V Clip   Mitral valve replacement , Epic Plus Valve   Today:  No acute events  Chest PT  DC Morrison  increase BB  Right chest US  Right Pigtail insertion       ICU Vital Signs Last 24 Hrs  T(C): 36.9 (01 Dec 2022 13:00), Max: 36.9 (01 Dec 2022 08:00)  T(F): 98.4 (01 Dec 2022 13:00), Max: 98.4 (01 Dec 2022 08:00)  HR: 88 (01 Dec 2022 14:00) (70 - 90)  BP: 136/65 (01 Dec 2022 14:00) (136/65 - 159/70)  BP(mean): 93 (01 Dec 2022 14:00) (93 - 100)  ABP: 96/67 (01 Dec 2022 13:00) (96/67 - 176/62)  ABP(mean): 74 (01 Dec 2022 13:00) (67 - 114)  RR: 20 (01 Dec 2022 14:00) (12 - 25)  SpO2: 95% (01 Dec 2022 14:00) (93% - 98%)    O2 Parameters below as of 01 Dec 2022 14:00  Patient On (Oxygen Delivery Method): room air            Physical Exam:  Gen: A&O   CNS: non focal 	  Neck: no JVD  RES : clear , no wheezing              CVS: Regular  rhythm. Normal S1/S2  Abd: Soft, non-distended. Bowel sounds present.  Skin: No rash.  Ext:  no edema    ============================I/O===========================   I&O's Detail    2022 07:01  -  01 Dec 2022 07:00  --------------------------------------------------------  IN:    Oral Fluid: 720 mL  Total IN: 720 mL    OUT:    Chest Tube (mL): 45 mL    Chest Tube (mL): 165 mL    Indwelling Catheter - Urethral (mL): 1845 mL  Total OUT: 2055 mL    Total NET: -1335 mL        ============================ LABS =========================                        8.5    16.28 )-----------( 101      ( 01 Dec 2022 02:10 )             25.7     12-01    139  |  105  |  44.6<H>  ----------------------------<  106<H>  3.9   |  22.0  |  1.59<H>    Ca    8.0<L>      01 Dec 2022 02:10  Phos  3.1     11-30  Mg     2.4         TPro  4.9<L>  /  Alb  2.9<L>  /  TBili  0.9  /  DBili  0.3  /  AST  34<H>  /  ALT  31  /  AlkPhos  150<H>      LIVER FUNCTIONS - ( 01 Dec 2022 03:45 )  Alb: 2.9 g/dL / Pro: 4.9 g/dL / ALK PHOS: 150 U/L / ALT: 31 U/L / AST: 34 U/L / GGT: x           PT/INR - ( 01 Dec 2022 02:10 )   PT: 13.2 sec;   INR: 1.14 ratio         PTT - ( 01 Dec 2022 02:10 )  PTT:25.2 sec    Urinalysis Basic - ( 01 Dec 2022 00:15 )    Color: Yellow / Appearance: Clear / S.015 / pH: x  Gluc: x / Ketone: Negative  / Bili: Negative / Urobili: Negative mg/dL   Blood: x / Protein: Negative / Nitrite: Negative   Leuk Esterase: Negative / RBC: 0-2 /HPF / WBC 0-2 /HPF   Sq Epi: x / Non Sq Epi: Occasional / Bacteria: Occasional      ======================Micro/Rad/Cardio=================  Culture: Reviewed   CXR: Reviewed  Echo:Reviewed  ======================================================  PAST MEDICAL & SURGICAL HISTORY:  Hypertension      Hyperlipidemia      Hypothyroid      Breast cancer      History of mitral valve prolapse      S/P mastectomy, left        ====================ASSESSMENT ==============  83yo F w/ PMHx of CAD, LBBB, HTN, HLD admitted to [facility] after experiencing acute onset chest pain, found to have NSTEMI and underwent cardiac cath that revealed triple vessel CAD, placed on Heparin gtt and loaded with Plavix.  TTE also confirmed severe MR. Patient transferred to [facility] ICU for surgical evaluation and medical management on . Had intraoperative IABP that was d/c'd  pt subsequently became bradycardic and hypotensive.  Urgently brought to cath labs and IABP reinserted in subsequent groin.  Pt transferred to CTICU Dr Ackerman service.  +NSTEMI.  since s.p C3L MVR(t) DEVONTE Clip came ou on primacor, levophed - briefly on dobutamine since d.c for tachycardia - currently remains on priamcor for cardiogenic shock +/- levophed for hypotension   post op also with acute blood loss anemia requiring blood, and acute hypoxic respiratory failure with high oxygen requirement on vent - since weaned off.  condition guarded     --- Triple vessel coronary artery disease.   ---s.p CABG   --- Severe mitral regurgitation by prior echocardiogram.   ---s.p MVR / CABG   --- HTN (hypertension).   ---HLD (hyperlipidemia).   --- Non-ST elevation MI (NSTEMI).   ---Post op Hypovolemia  ---Post op respiratory insufficiency       Plan:  -Right chest US  -Right Pigtail insertion   -pain control prn   -diuresis when able.  -increase  beta blocker to 50 mg bid  -Continue statin--  ====================== NEUROLOGY=====================  diphenhydrAMINE 25 milliGRAM(s) Oral every 4 hours PRN Rash and/or Itching  oxyCODONE    IR 5 milliGRAM(s) Oral every 6 hours PRN Moderate Pain (4 - 6)  sertraline 100 milliGRAM(s) Oral daily    ==================== RESPIRATORY======================  Post op respiratory insufficiency    ====================CARDIOVASCULAR==================  Post op Hypovolemia  furosemide    Tablet 40 milliGRAM(s) Oral daily  metoprolol tartrate 50 milliGRAM(s) Oral two times a day    ===================HEMATOLOGIC/ONC ===================  Monitor H&H/Plts    aspirin enteric coated 81 milliGRAM(s) Oral daily  heparin   Injectable 5000 Unit(s) SubCutaneous every 12 hours    ===================== RENAL =========================  Continue monitoring urine output, I&OS, BUN/Cr     ==================== GASTROINTESTINAL===================  ascorbic acid 500 milliGRAM(s) Oral daily  dextrose 10% Bolus 250 milliLiter(s) IV Bolus once  dextrose 5%. 1000 milliLiter(s) (50 mL/Hr) IV Continuous <Continuous>  ferrous    sulfate 325 milliGRAM(s) Oral daily  folic acid 1 milliGRAM(s) Oral daily  folic acid 1 milliGRAM(s) Oral daily  pantoprazole    Tablet 40 milliGRAM(s) Oral before breakfast  polyethylene glycol 3350 17 Gram(s) Oral daily  senna 2 Tablet(s) Oral at bedtime  sodium chloride 0.9% lock flush 3 milliLiter(s) IV Push every 8 hours  sodium chloride 0.9%. 1000 milliLiter(s) (10 mL/Hr) IV Continuous <Continuous>  sodium chloride 0.9%. 1000 milliLiter(s) (5 mL/Hr) IV Continuous <Continuous>    =======================    ENDOCRINE  =====================  atorvastatin 80 milliGRAM(s) Oral at bedtime  dextrose Oral Gel 15 Gram(s) Oral once PRN Blood Glucose LESS THAN 70 milliGRAM(s)/deciliter  insulin lispro (ADMELOG) corrective regimen sliding scale   SubCutaneous Before meals and at bedtime  levothyroxine 112 MICROGram(s) Oral daily    ========================INFECTIOUS DISEASE================      -Close hemodynamic , ventilatory and drain monitoring and management per post op routine .  -Monitor Neurologic status ,   -Head of the bed should remain elevated to 45 degrees,  -Monitor adequacy of oxygenation and ventilation and attempt to wean oxygen ,  -Monitor for arrhythmias and monitor parameters for organ perfusion,  -Glycemic control is satisfactory,  -Nutritional goals will be met using po eventually , insure adequate caloric intake and monitor the same ,  -Electrolytes have been repleted as necessary , pain control has been achieved  and wound care has been carried out ,  -Stress ulcer and VTE prophylaxis will be achieved,  -Agressive PT and early mobility and ambulation goals will be met,  -The family was updated about the course and plan .      I have spent 35 minutes providing acute care for this critically ill patient     Patient requires continuous monitoring with bedside rhythm monitoring, pulse ox monitoring, and intermittent blood gas analysis. Care plan discussed with ICU care team. Patient remained critical and at risk for life threatening decompensation.

## 2022-12-01 NOTE — CONSULT NOTE ADULT - REASON FOR ADMISSION
NSTEMI, CAD with chest pain

## 2022-12-01 NOTE — PROGRESS NOTE ADULT - SUBJECTIVE AND OBJECTIVE BOX
Brief summary:  82yFemale POD# 4 MVR, C3 L DEVONTE clip     SUBJECTIVE:   Pt OOB t the chair, Pt states, " I feel like an itchy spell coming on"  Pt c/o if itchiness throughout her arms and legs, pt also c/o right sided abdominal pain tender to touch (abdomen soft non-distended)      Overnight events:  none    PAST MEDICAL & SURGICAL HISTORY:  Hypertension      Hyperlipidemia      Hypothyroid      Breast cancer      History of mitral valve prolapse      S/P mastectomy, left          MEDICATIONS  ascorbic acid 500 milliGRAM(s) Oral daily  aspirin enteric coated 81 milliGRAM(s) Oral daily  atorvastatin 80 milliGRAM(s) Oral at bedtime  chlorhexidine 2% Cloths 1 Application(s) Topical <User Schedule>  dextrose 10% Bolus 250 milliLiter(s) IV Bolus once  dextrose 5%. 1000 milliLiter(s) IV Continuous <Continuous>  dextrose Oral Gel 15 Gram(s) Oral once PRN  diphenhydrAMINE 25 milliGRAM(s) Oral every 4 hours PRN  ferrous    sulfate 325 milliGRAM(s) Oral daily  folic acid 1 milliGRAM(s) Oral daily  furosemide   Injectable 40 milliGRAM(s) IV Push two times a day  heparin   Injectable 5000 Unit(s) SubCutaneous every 12 hours  insulin lispro (ADMELOG) corrective regimen sliding scale   SubCutaneous Before meals and at bedtime  levothyroxine 112 MICROGram(s) Oral daily  lidocaine   4% Patch 1 Patch Transdermal daily  metoprolol tartrate 25 milliGRAM(s) Oral two times a day  mupirocin 2% Nasal 1 Application(s) Both Nostrils two times a day  oxyCODONE    IR 5 milliGRAM(s) Oral every 6 hours PRN  pantoprazole    Tablet 40 milliGRAM(s) Oral before breakfast  polyethylene glycol 3350 17 Gram(s) Oral daily  potassium chloride   Powder 40 milliEquivalent(s) Oral once  senna 2 Tablet(s) Oral at bedtime  sertraline 100 milliGRAM(s) Oral daily  sodium chloride 0.9%. 1000 milliLiter(s) IV Continuous <Continuous>  sodium chloride 0.9%. 1000 milliLiter(s) IV Continuous <Continuous>  MEDICATIONS  (PRN):  dextrose Oral Gel 15 Gram(s) Oral once PRN Blood Glucose LESS THAN 70 milliGRAM(s)/deciliter  diphenhydrAMINE 25 milliGRAM(s) Oral every 4 hours PRN Rash and/or Itching  oxyCODONE    IR 5 milliGRAM(s) Oral every 6 hours PRN Moderate Pain (4 - 6)      Daily     Daily                               8.5    16.28 )-----------( 101      ( 01 Dec 2022 02:10 )             25.7   12-01    139  |  105  |  44.6<H>  ----------------------------<  106<H>  3.9   |  22.0  |  1.59<H>    Ca    8.0<L>      01 Dec 2022 02:10  Phos  3.1     11-30  Mg     2.4     12-01        PT/INR - ( 01 Dec 2022 02:10 )   PT: 13.2 sec;   INR: 1.14 ratio         PTT - ( 01 Dec 2022 02:10 )  PTT:25.2 sec      Objective:  T(C): 36.3 (12-01-22 @ 00:00), Max: 36.9 (11-30-22 @ 04:00)  HR: 78 (12-01-22 @ 03:00) (75 - 98)  BP: 127/58 (11-30-22 @ 09:00) (127/58 - 135/62)  RR: 13 (12-01-22 @ 03:00) (11 - 24)  SpO2: 96% (12-01-22 @ 03:00) (93% - 100%)  Wt(kg): --CAPILLARY BLOOD GLUCOSE      POCT Blood Glucose.: 149 mg/dL (30 Nov 2022 21:46)  POCT Blood Glucose.: 150 mg/dL (30 Nov 2022 16:39)  POCT Blood Glucose.: 161 mg/dL (30 Nov 2022 11:21)  POCT Blood Glucose.: 126 mg/dL (30 Nov 2022 08:14)  I&O's Summary    29 Nov 2022 07:01  -  30 Nov 2022 07:00  --------------------------------------------------------  IN: 1814.8 mL / OUT: 1375 mL / NET: 439.8 mL    30 Nov 2022 07:01  -  01 Dec 2022 03:32  --------------------------------------------------------  IN: 240 mL / OUT: 1515 mL / NET: -1275 mL            Physical Exam  Neuro: A+O x 3, non-focal, speech clear and intact  HEENT:  NCAT, PERRL, mid upper lip swelling   Neck: supple, trachea midline  Pulm: Diminished b/l at the bases, no rales/rhonchi/wheezing, no accessory muscle use noted  CV: intermittently irregularly irregular/ regular +S1S2, no murmur or rub noted  Abd: soft, right sided tenderness, ND, + BS  Ext: MOSHER x 4, trace edema, B/L UE ecchymosis and b/l UE and right LE petechia noted/ rash, L groin C/D/I/soft/no hematoma. 2+ DP b/l, distal motor/neuro/circ intact.   Inc: MSI C/D/I/stable w/ dressing, Left VH C/D/I with Ace wrap  Chest tubes: 1 med and 1 Left   +PW 45/10/.8  threshold 2.0    Imaging:  CXR:  < from: Xray Chest 1 View- PORTABLE-Routine (11.30.22 @ 16:16) >    ACC: [id number] EXAM:  XR CHEST PORTABLE ROUTINE 1V                          PROCEDURE DATE:  11/30/2022          INTERPRETATION:  INDICATION: Status post open heart surgery    Portable chest at 3:48 PM    COMPARISON: 5:23 AM      FINDINGS:  Heart/Vascular: The heart size, mediastinum, hilum and aorta are within   normal limits for projection.Status post median sternotomy, CABG, mitral   valve replacement and left atrial appendage clip.  Pulmonary: Midline trachea. There is a persistent small right effusion   with atelectasis right base. There is partial clearing left base. There   is no pneumothorax.  Bones: There is no fracture.  Lines and catheter: Right IJ introducer sheath remains. Mediastinal drain   and left chest tube remains.    Impression:    Persistent small right effusion with atelectasis    Partial clearing left base.    --- End of Report ---            COOKIE A LENZ DO; Attending Radiologist  This document has been electronically signed. Nov 30 2022  4:35PM    < end of copied text >      ECG:  < from: 12 Lead ECG (11.28.22 @ 03:27) >    Ventricular Rate 93 BPM    Atrial Rate 93 BPM    P-R Interval 132 ms    QRS Duration 124 ms    Q-T Interval 380 ms    QTC Calculation(Bazett) 472 ms    P Axis 88 degrees    R Axis -10 degrees    T Axis 142 degrees    Diagnosis Line Normal sinus rhythm  Left bundle branch block  ST depression in Anterolateral leads  Abnormal ECG    Confirmed by IRVING CONTE ([id number]) on 11/30/2022 6:39:04 AM    < end of copied text >

## 2022-12-01 NOTE — CONSULT NOTE ADULT - SUBJECTIVE AND OBJECTIVE BOX
82 year old female with PMH of HTN, HLD, LBBB, mitral valve prolapse, hypothyroidism, breast CA 10 years ago s/p chemotherapy and left mastectomy was initially admitted at [facility] for NSTEMI s/p cardiac catheterization which showed severe LAD, LCx, and occlusion of RCA with ventriculogram showing EF 30% and severe MR. She was subsequently transferred to [facility] s/p MVR / CABG. Hospital course is notable for CHAVO.     PAST MEDICAL & SURGICAL HISTORY:  Hypertension  Hyperlipidemia  Hypothyroid  Breast cancer  History of mitral valve prolapse  S/P mastectomy, left    Allergies  amiodarone (Hives)  Intolerances  iodinated radiocontrast agents (Vomiting; Headache)    Home Medications:  Lipitor 20 mg oral tablet: 1 tab(s) orally once a day (28 Nov 2022 07:41)  Protonix 40 mg oral delayed release tablet: 1 tab(s) orally once a day (28 Nov 2022 07:41)  Synthroid 112 mcg (0.112 mg) oral tablet: 1 tab(s) orally once a day (28 Nov 2022 07:41)  Toprol-XL 50 mg oral tablet, extended release: 1 tab(s) orally once a day (28 Nov 2022 07:41)  Zoloft 100 mg oral tablet: 1 tab(s) orally once a day (28 Nov 2022 07:41)    FAMILY HISTORY:  Family history of systemic lupus erythematosus (Child)  Family history of CVA (Grandparent)    Social History:  Never smoker, no alcohol  Resides alone  HCP appoints daughter Iris Diamond (does not know phone number) (23 Nov 2022 21:40)    ROS:     Vital Signs Last 24 Hrs  T(C): 36.9 (01 Dec 2022 08:00), Max: 36.9 (01 Dec 2022 08:00)  T(F): 98.4 (01 Dec 2022 08:00), Max: 98.4 (01 Dec 2022 08:00)  HR: 70 (01 Dec 2022 08:00) (70 - 90)  BP: 159/70 (01 Dec 2022 05:00) (159/70 - 159/70)  BP(mean): 100 (01 Dec 2022 05:00) (100 - 100)  RR: 14 (01 Dec 2022 08:00) (12 - 25)  SpO2: 95% (01 Dec 2022 08:00) (93% - 99%)    Parameters below as of 01 Dec 2022 08:00  Patient On (Oxygen Delivery Method): room air    I&O's Summary    30 Nov 2022 07:01  -  01 Dec 2022 07:00  --------------------------------------------------------  IN: 720 mL / OUT: 2055 mL / NET: -1335 mL    12-01    139  |  105  |  44.6<H>  ----------------------------<  106<H>  3.9   |  22.0  |  1.59<H>    Ca    8.0<L>      01 Dec 2022 02:10  Phos  3.1     11-30  Mg     2.4     12-01    TPro  4.9<L>  /  Alb  2.9<L>  /  TBili  0.9  /  DBili  0.3  /  AST  34<H>  /  ALT  31  /  AlkPhos  150<H>  12-01               8.5    16.28 )-----------( 101      ( 01 Dec 2022 02:10 )             25.7     MEDICATIONS  (STANDING):  ascorbic acid 500 milliGRAM(s) Oral daily  aspirin enteric coated 81 milliGRAM(s) Oral daily  atorvastatin 80 milliGRAM(s) Oral at bedtime  chlorhexidine 2% Cloths 1 Application(s) Topical <User Schedule>  dextrose 10% Bolus 250 milliLiter(s) IV Bolus once  dextrose 5%. 1000 milliLiter(s) (50 mL/Hr) IV Continuous <Continuous>  ferrous    sulfate 325 milliGRAM(s) Oral daily  folic acid 1 milliGRAM(s) Oral daily  furosemide    Tablet 40 milliGRAM(s) Oral daily  heparin   Injectable 5000 Unit(s) SubCutaneous every 12 hours  insulin lispro (ADMELOG) corrective regimen sliding scale   SubCutaneous Before meals and at bedtime  levothyroxine 112 MICROGram(s) Oral daily  lidocaine   4% Patch 1 Patch Transdermal daily  metoprolol tartrate 50 milliGRAM(s) Oral two times a day  mupirocin 2% Nasal 1 Application(s) Both Nostrils two times a day  pantoprazole    Tablet 40 milliGRAM(s) Oral before breakfast  polyethylene glycol 3350 17 Gram(s) Oral daily  senna 2 Tablet(s) Oral at bedtime  sertraline 100 milliGRAM(s) Oral daily  sodium chloride 0.9%. 1000 milliLiter(s) (10 mL/Hr) IV Continuous <Continuous>  sodium chloride 0.9%. 1000 milliLiter(s) (5 mL/Hr) IV Continuous <Continuous>    MEDICATIONS  (PRN):  dextrose Oral Gel 15 Gram(s) Oral once PRN Blood Glucose LESS THAN 70 milliGRAM(s)/deciliter  diphenhydrAMINE 25 milliGRAM(s) Oral every 4 hours PRN Rash and/or Itching  oxyCODONE    IR 5 milliGRAM(s) Oral every 6 hours PRN Moderate Pain (4 - 6)       82 year old female with PMH of HTN, HLD, LBBB, mitral valve prolapse, hypothyroidism, breast CA 10 years ago s/p chemotherapy and left mastectomy was initially admitted at [facility] for NSTEMI s/p cardiac catheterization which showed severe LAD, LCx, and occlusion of RCA with ventriculogram showing EF 30% and severe MR. She was subsequently transferred to [facility] s/p MVR / CABG. Hospital course is notable for CHAVO.     PAST MEDICAL & SURGICAL HISTORY:  Hypertension  Hyperlipidemia  Hypothyroid  Breast cancer  History of mitral valve prolapse  S/P mastectomy, left    Allergies  amiodarone (Hives)  Intolerances  iodinated radiocontrast agents (Vomiting; Headache)    Home Medications:  Lipitor 20 mg oral tablet: 1 tab(s) orally once a day (28 Nov 2022 07:41)  Protonix 40 mg oral delayed release tablet: 1 tab(s) orally once a day (28 Nov 2022 07:41)  Synthroid 112 mcg (0.112 mg) oral tablet: 1 tab(s) orally once a day (28 Nov 2022 07:41)  Toprol-XL 50 mg oral tablet, extended release: 1 tab(s) orally once a day (28 Nov 2022 07:41)  Zoloft 100 mg oral tablet: 1 tab(s) orally once a day (28 Nov 2022 07:41)    FAMILY HISTORY:  Family history of systemic lupus erythematosus (Child)  Family history of CVA (Grandparent)    Social History:  Never smoker, no alcohol  Resides alone  HCP appoints daughter Iris Diamond (does not know phone number) (23 Nov 2022 21:40)    ROS: "I feel better"    Vital Signs Last 24 Hrs  T(C): 36.9 (01 Dec 2022 08:00), Max: 36.9 (01 Dec 2022 08:00)  T(F): 98.4 (01 Dec 2022 08:00), Max: 98.4 (01 Dec 2022 08:00)  HR: 70 (01 Dec 2022 08:00) (70 - 90)  BP: 159/70 (01 Dec 2022 05:00) (159/70 - 159/70)  BP(mean): 100 (01 Dec 2022 05:00) (100 - 100)  RR: 14 (01 Dec 2022 08:00) (12 - 25)  SpO2: 95% (01 Dec 2022 08:00) (93% - 99%)    Parameters below as of 01 Dec 2022 08:00  Patient On (Oxygen Delivery Method): room air    I&O's Summary    30 Nov 2022 07:01  -  01 Dec 2022 07:00  --------------------------------------------------------  IN: 720 mL / OUT: 2055 mL / NET: -1335 mL    Physical Exam  General: WDWN female in NAD  Cardiac: S1S2 RRR  Respiratory: CTAB  Abdomen: Soft, NT  Extremities: 2+ pitting edema of b/l LE  Neuro: AAOx3    12-01    139  |  105  |  44.6<H>  ----------------------------<  106<H>  3.9   |  22.0  |  1.59<H>    Ca    8.0<L>      01 Dec 2022 02:10  Phos  3.1     11-30  Mg     2.4     12-01    TPro  4.9<L>  /  Alb  2.9<L>  /  TBili  0.9  /  DBili  0.3  /  AST  34<H>  /  ALT  31  /  AlkPhos  150<H>  12-01               8.5    16.28 )-----------( 101      ( 01 Dec 2022 02:10 )             25.7     MEDICATIONS  (STANDING):  ascorbic acid 500 milliGRAM(s) Oral daily  aspirin enteric coated 81 milliGRAM(s) Oral daily  atorvastatin 80 milliGRAM(s) Oral at bedtime  chlorhexidine 2% Cloths 1 Application(s) Topical <User Schedule>  dextrose 10% Bolus 250 milliLiter(s) IV Bolus once  dextrose 5%. 1000 milliLiter(s) (50 mL/Hr) IV Continuous <Continuous>  ferrous    sulfate 325 milliGRAM(s) Oral daily  folic acid 1 milliGRAM(s) Oral daily  furosemide    Tablet 40 milliGRAM(s) Oral daily  heparin   Injectable 5000 Unit(s) SubCutaneous every 12 hours  insulin lispro (ADMELOG) corrective regimen sliding scale   SubCutaneous Before meals and at bedtime  levothyroxine 112 MICROGram(s) Oral daily  lidocaine   4% Patch 1 Patch Transdermal daily  metoprolol tartrate 50 milliGRAM(s) Oral two times a day  mupirocin 2% Nasal 1 Application(s) Both Nostrils two times a day  pantoprazole    Tablet 40 milliGRAM(s) Oral before breakfast  polyethylene glycol 3350 17 Gram(s) Oral daily  senna 2 Tablet(s) Oral at bedtime  sertraline 100 milliGRAM(s) Oral daily  sodium chloride 0.9%. 1000 milliLiter(s) (10 mL/Hr) IV Continuous <Continuous>  sodium chloride 0.9%. 1000 milliLiter(s) (5 mL/Hr) IV Continuous <Continuous>    MEDICATIONS  (PRN):  dextrose Oral Gel 15 Gram(s) Oral once PRN Blood Glucose LESS THAN 70 milliGRAM(s)/deciliter  diphenhydrAMINE 25 milliGRAM(s) Oral every 4 hours PRN Rash and/or Itching  oxyCODONE    IR 5 milliGRAM(s) Oral every 6 hours PRN Moderate Pain (4 - 6)

## 2022-12-01 NOTE — PROCEDURE NOTE - PROCEDURE DATE TIME, MLM
26-Nov-2022
01-Dec-2022 15:00
65yoF w/ pMHx CAD (s/p stents in 2010, 2011, 2 stents in pLAD and mLAD on 02/8/2017), depression, GERD, hypothyroidism, diverticulitis, paralyzed hemidiaphragm, asthma p/w with 3 days of chest pain a/w SOB, dizziness, nausea and vomiting. Patient was recently admitted 2/08 for chest pain a/w nausea/vomiting/diarrhea found to have stent thrombosis in pLAD s/p stent removal, thrombolysis requiring intra-aortic balloon pump and managed by CCU. Patient states she has been compliant with her cardiac medications since her last discharge. Admitted to CCU for ACS protocol. EKG slightly better from previous. Nitro gtt held 2/2 soft BP , Heparin gtt, plavix,asa, restarted home medications including psych meds. Low TSH, high free T4. Decreased synthroid to 112. Lithium level is normal. lithium changed to lithobid to improve abd discomfort/n/v.  No intervention fro chest pain c/w with medical management.
DISPLAY PLAN FREE TEXT
65yoF w/ pMHx CAD (s/p stents in 2010, 2011, 2 stents in pLAD and mLAD on 02/8/2017), depression, GERD, hypothyroidism, diverticulitis, paralyzed hemidiaphragm, asthma p/w with 3 days of chest pain a/w SOB, dizziness, nausea and vomiting. Patient was recently admitted 2/08 for chest pain a/w nausea/vomiting/diarrhea found to have stent thrombosis in pLAD s/p stent removal, thrombolysis requiring intra-aortic balloon pump and managed by CCU. Patient states she has been compliant with her cardiac medications since her last discharge. Admitted to CCU for ACS protocol. EKG slightly better from previous. Nitro gtt held 2/2 soft BP , Heparin gtt, plavix,asa, restarted home medications including psych meds. Low TSH, high free T4. Decreased synthroid to 112. Lithium level is normal. lithium changed to lithobid to improve abd discomfort/n/v.  No intervention fro chest pain c/w with medical management. Patient remained stable with no events on telemetry.   Her creatinine improved after Lisinopril was stopped.  She is now clear for discharge home.

## 2022-12-01 NOTE — CONSULT NOTE ADULT - ASSESSMENT
82 year old female with PMH of HTN, HLD, LBBB, mitral valve prolapse, hypothyroidism, breast CA 10 years ago s/p chemotherapy and left mastectomy was initially admitted at [facility] for NSTEMI s/p cardiac catheterization which showed severe LAD, LCx, and occlusion of RCA with ventriculogram showing EF 30% and severe MR. She was subsequently transferred to [facility] s/p MVR / CABG / L atrial appendage clipping on 11/27. Hospital course is notable for CHAVO.     -Baseline creatinine ~0.8mg/dL  -While here creatinine peaked at 1.7mg/dL; improved today to 1.5mg/dL  -Suspecting CHAVO to be due to cardiorenal syndrome   -UA no protein, no blood   -UACR 76mg/g; UPCR 0.4g/g   -Will hold off on renal ultrasound as creatinine is improving, so obstruction is less likely   -BP acceptable  -Volume Status - remains hypervolemic on exam - continue diuretics     Leodan Rivera DO  Nephrology  [facility]  [address] [place] [address]  Office Number [phone]    
81yo F w/ PMHx of CAD, LBBB, HTN, HLD admitted to [facility] yesterday after experiencing acute onset chest pain with radiation to back.  Found to have NSTEMI and underwent cardiac cath that revealed triple vessel CAD, placed on Heparin gtt and loaded with Plavix.  TTE also confirmed severe MR.  This evening patient transferred to [facility] ICU for further evaluation and medical management.  Patient with right femoral access site (sheet) and Hines Cath in place conformed by bedside CXR this evening.  Patient denies chest pain, diaphoresis, dyspnea, palpitations, fever, chills or HA.  MICU and CT ICU team by the bedside.         Pt pending TTE to further evaluate severe MR. Pt hemodynamically stable at this time. CI via SGC pending per nursing. Pt remains on heparin gtt with IABP for ACS, elevated troponin, ck, BNP.  Pt to be further evaluated for possible cardiac surgery. Dr. Laughlin aware.  
83yo F w/ CAD, HTN, HLD, known LBBB transferred from [facility] this evening after she was found to have NSTEMI and underwent cardiac cath that revealed triple vessel CAD, placed on Heparin gtt and loaded with Plavix.  TTE also confirmed severe MR currently in MICU for further evaluation and medical management.  + Samaria rebeca Cath in place, patient denies chest pain, diaphoresis, dyspnea, palpitations, fever, chills or HA.  Stable hemodynamically  ECG: NSR old LBBB

## 2022-12-01 NOTE — CONSULT NOTE ADULT - SUBJECTIVE AND OBJECTIVE BOX
82yF was admitted on     Patient is a 82y old  Female who presents with a chief complaint of NSTEMI, CAD with chest pain (01 Dec 2022 03:29)    HPI:  Ms. Jang is an 81 yo female, PMHx of LBBB, HTN, HLD, mitral valve prolapse, hypothyroidism, breast CA 10 years ago s/p chemotherapy and left mastectomy, admitted to [facility]  after experiencing acute onset chest pain with radiation to back that onset the night prior. Ruled in for NSTEMI with positive troponin 430 --> 446. She underwent diagnostic cardiac catheterization with Dr. Garibay, which revealed triple vessel CAD, with severe LAD, LCx, and occlusion of RCA. Ventriculogram with EF 30% with concerns for severe MR. She was given ASA, Plavix load 600mg, and placed on Heparin gtt for ACS. An IABP and SGC were placed and she was transferred to [facility] MICU.  She is now s/p MVR / CABG.  She reports she is having some difficulty with her mobility.         Imaging reviewed showed:      ACC: [id number] EXAM:  XR CHEST PORTABLE ROUTINE 1V                          PROCEDURE DATE:  2022          INTERPRETATION:  INDICATION: Status post open heart surgery    Portable chest at 3:48 PM    COMPARISON: 5:23 AM      FINDINGS:  Heart/Vascular: The heart size, mediastinum, hilum and aorta are within   normal limits for projection.Status post median sternotomy, CABG, mitral   valve replacement and left atrial appendage clip.  Pulmonary: Midline trachea. There is a persistent small right effusion   with atelectasis right base. There is partial clearing left base. There   is no pneumothorax.  Bones: There is no fracture.  Lines and catheter: Right IJ introducer sheath remains. Mediastinal drain   and left chest tube remains.    Impression:    Persistent small right effusion with atelectasis    Partial clearing left base.    REVIEW OF SYSTEMS  Constitutional - No fever, No weight loss, No fatigue  HEENT - No eye pain, No visual disturbances, No difficulty hearing, No tinnitus, No vertigo, No neck pain  Respiratory - No cough, No wheezing, No shortness of breath  Cardiovascular - No chest pain, No palpitations  Gastrointestinal - No abdominal pain, No nausea, No vomiting, No diarrhea, No constipation  Genitourinary - No dysuria, No frequency, No hematuria, No incontinence  Neurological - No headaches, No memory loss, + loss of strength, No numbness, No tremors  Skin - No itching, No rashes, No lesions   Endocrine - No temperature intolerance  Musculoskeletal - No joint pain, No joint swelling, No muscle pain  Psychiatric - No depression, No anxiety    VITALS  T(C): 36.9 (22 @ 08:00), Max: 36.9 (22 @ 08:00)  HR: 70 (22 @ 08:00) (70 - 97)  BP: 159/70 (22 @ 05:00) (127/58 - 159/70)  RR: 14 (22 @ 08:00) (12 - 25)  SpO2: 95% (22 @ 08:00) (93% - 100%)  Wt(kg): --    PAST MEDICAL & SURGICAL HISTORY  Hypertension    Hyperlipidemia    Hypothyroid    Breast cancer    History of mitral valve prolapse    S/P mastectomy, left        SOCIAL HISTORY - as per documentation/history  Smoking - None  EtOH - None  Drugs - None    FUNCTIONAL HISTORY  Previously independent.  Plans to return to daughter home after rehab, 2 steps to enter.      CURRENT FUNCTIONAL STATUS    Transfer: Sit to Stand:     · Level of [place]	moderate assist (50% patients effort)  · Physical Assist/Nonphysical Assist	1 person assist; verbal cues    Transfer: Stand to Sit:     · Level of [place]	moderate assist (50% patients effort)  · Physical Assist/Nonphysical Assist	1 person assist; verbal cues    Sit/Stand Transfer Safety Analysis:     · Impairments Contributing to Impaired Transfers	impaired balance; decreased strength    Gait Skills:     · Level of [place]	moderate assist (50% patients effort)  · Physical Assist/Nonphysical Assist	1 person + 1 person to manage equipment  · Assistive Device	rolling walker  · Gait Distance	1 step    Stair Negotiation:     · Level of [place]	unable to perform      FAMILY HISTORY   Family history of systemic lupus erythematosus (Child)    Family history of CVA (Grandparent)        RECENT LABS - Reviewed  CBC Full  -  ( 01 Dec 2022 02:10 )  WBC Count : 16.28 K/uL  RBC Count : 2.81 M/uL  Hemoglobin : 8.5 g/dL  Hematocrit : 25.7 %  Platelet Count - Automated : 101 K/uL  Mean Cell Volume : 91.5 fl  Mean Cell Hemoglobin : 30.2 pg  Mean Cell Hemoglobin Concentration : 33.1 gm/dL  Auto Neutrophil # : x  Auto Lymphocyte # : x  Auto Monocyte # : x  Auto Eosinophil # : x  Auto Basophil # : x  Auto Neutrophil % : x  Auto Lymphocyte % : x  Auto Monocyte % : x  Auto Eosinophil % : x  Auto Basophil % : x        139  |  105  |  44.6<H>  ----------------------------<  106<H>  3.9   |  22.0  |  1.59<H>    Ca    8.0<L>      01 Dec 2022 02:10  Phos  3.1     11-30  Mg     2.4         TPro  4.9<L>  /  Alb  2.9<L>  /  TBili  0.9  /  DBili  0.3  /  AST  34<H>  /  ALT  31  /  AlkPhos  150<H>      Urinalysis Basic - ( 01 Dec 2022 00:15 )    Color: Yellow / Appearance: Clear / S.015 / pH: x  Gluc: x / Ketone: Negative  / Bili: Negative / Urobili: Negative mg/dL   Blood: x / Protein: Negative / Nitrite: Negative   Leuk Esterase: Negative / RBC: 0-2 /HPF / WBC 0-2 /HPF   Sq Epi: x / Non Sq Epi: Occasional / Bacteria: Occasional        ALLERGIES  iodinated radiocontrast agents (Vomiting; Headache)  No Known Allergies      MEDICATIONS   ascorbic acid 500 milliGRAM(s) Oral daily  aspirin enteric coated 81 milliGRAM(s) Oral daily  atorvastatin 80 milliGRAM(s) Oral at bedtime  chlorhexidine 2% Cloths 1 Application(s) Topical <User Schedule>  dextrose 10% Bolus 250 milliLiter(s) IV Bolus once  dextrose 5%. 1000 milliLiter(s) IV Continuous <Continuous>  dextrose Oral Gel 15 Gram(s) Oral once PRN  diphenhydrAMINE 25 milliGRAM(s) Oral every 4 hours PRN  ferrous    sulfate 325 milliGRAM(s) Oral daily  folic acid 1 milliGRAM(s) Oral daily  furosemide   Injectable 40 milliGRAM(s) IV Push two times a day  heparin   Injectable 5000 Unit(s) SubCutaneous every 12 hours  insulin lispro (ADMELOG) corrective regimen sliding scale   SubCutaneous Before meals and at bedtime  levothyroxine 112 MICROGram(s) Oral daily  lidocaine   4% Patch 1 Patch Transdermal daily  metoprolol tartrate 50 milliGRAM(s) Oral two times a day  mupirocin 2% Nasal 1 Application(s) Both Nostrils two times a day  oxyCODONE    IR 5 milliGRAM(s) Oral every 6 hours PRN  pantoprazole    Tablet 40 milliGRAM(s) Oral before breakfast  polyethylene glycol 3350 17 Gram(s) Oral daily  senna 2 Tablet(s) Oral at bedtime  sertraline 100 milliGRAM(s) Oral daily  sodium chloride 0.9%. 1000 milliLiter(s) IV Continuous <Continuous>  sodium chloride 0.9%. 1000 milliLiter(s) IV Continuous <Continuous>      ----------------------------------------------------------------------------------------  PHYSICAL EXAM  Constitutional - NAD, Comfortable  HEENT - NCAT, EOMI  Neck - Supple, No limited ROM  Chest - Breathing comfortably, No wheezing  Cardiovascular - No cyanosis   Abdomen - Soft   Extremities - No C/C/E, No calf tenderness   Neurologic Exam -                    Cognitive - Awake, Alert, AAO to self, place, date, year, situation     Communication - Fluent, No dysarthria     Cranial Nerves - CN 2-12 intact     Motor - 4-5/5 strength throughout      Sensory - Intact to LT     Reflexes - No clonus      Coordination - FTN intact  Psychiatric - Mood stable, Affect WNL  ----------------------------------------------------------------------------------------  ASSESSMENT/PLAN  82yFemale with functional deficits after MVR/CABG  CAD, S/P CABG - sternal precautions, aspirin, Atorvastatin metoprolol   Pain - Tylenol, oxycodone   Hypothyroid - levothyroxine   DVT PPX - SCDs, heparin  Rehab - Continue PT/OT.  Medically being optimized.  Recommend ACUTE inpatient rehabilitation for the functional deficits consisting of 3 hours of therapy/day & 24 hour RN/daily PMR physician for comorbid medical management. Patient will be able to tolerate 3 hours a day.    Will continue to follow. Functional progress will determine ongoing rehab dispo recommendations, which may change.    Continue bedside therapy as well as OOB throughout the day with mobilization by staff to maintain cardiopulmonary function and prevention of secondary complications related to debility.

## 2022-12-02 LAB
ANION GAP SERPL CALC-SCNC: 13 MMOL/L — SIGNIFICANT CHANGE UP (ref 5–17)
BUN SERPL-MCNC: 48.4 MG/DL — HIGH (ref 8–20)
CALCIUM SERPL-MCNC: 8.2 MG/DL — LOW (ref 8.4–10.5)
CHLORIDE SERPL-SCNC: 95 MMOL/L — LOW (ref 96–108)
CO2 SERPL-SCNC: 23 MMOL/L — SIGNIFICANT CHANGE UP (ref 22–29)
CREAT SERPL-MCNC: 1.46 MG/DL — HIGH (ref 0.5–1.3)
EGFR: 36 ML/MIN/1.73M2 — LOW
GLUCOSE SERPL-MCNC: 108 MG/DL — HIGH (ref 70–99)
HCT VFR BLD CALC: 32.3 % — LOW (ref 34.5–45)
HGB BLD-MCNC: 10.1 G/DL — LOW (ref 11.5–15.5)
INR BLD: 1.19 RATIO — HIGH (ref 0.88–1.16)
MAGNESIUM SERPL-MCNC: 2.3 MG/DL — SIGNIFICANT CHANGE UP (ref 1.6–2.6)
MCHC RBC-ENTMCNC: 29.2 PG — SIGNIFICANT CHANGE UP (ref 27–34)
MCHC RBC-ENTMCNC: 31.3 GM/DL — LOW (ref 32–36)
MCV RBC AUTO: 93.4 FL — SIGNIFICANT CHANGE UP (ref 80–100)
PLATELET # BLD AUTO: 77 K/UL — LOW (ref 150–400)
POTASSIUM SERPL-MCNC: 4.1 MMOL/L — SIGNIFICANT CHANGE UP (ref 3.5–5.3)
POTASSIUM SERPL-SCNC: 4.1 MMOL/L — SIGNIFICANT CHANGE UP (ref 3.5–5.3)
PROTHROM AB SERPL-ACNC: 13.8 SEC — HIGH (ref 10.5–13.4)
RBC # BLD: 3.46 M/UL — LOW (ref 3.8–5.2)
RBC # FLD: 16.8 % — HIGH (ref 10.3–14.5)
SODIUM SERPL-SCNC: 131 MMOL/L — LOW (ref 135–145)
WBC # BLD: 17.46 K/UL — HIGH (ref 3.8–10.5)
WBC # FLD AUTO: 17.46 K/UL — HIGH (ref 3.8–10.5)

## 2022-12-02 PROCEDURE — 71045 X-RAY EXAM CHEST 1 VIEW: CPT | Mod: 26

## 2022-12-02 PROCEDURE — 99233 SBSQ HOSP IP/OBS HIGH 50: CPT

## 2022-12-02 PROCEDURE — 71045 X-RAY EXAM CHEST 1 VIEW: CPT | Mod: 26,77

## 2022-12-02 PROCEDURE — 99231 SBSQ HOSP IP/OBS SF/LOW 25: CPT

## 2022-12-02 RX ADMIN — HEPARIN SODIUM 5000 UNIT(S): 5000 INJECTION INTRAVENOUS; SUBCUTANEOUS at 05:24

## 2022-12-02 RX ADMIN — CHLORHEXIDINE GLUCONATE 1 APPLICATION(S): 213 SOLUTION TOPICAL at 05:24

## 2022-12-02 RX ADMIN — Medication 500 MILLIGRAM(S): at 12:02

## 2022-12-02 RX ADMIN — SENNA PLUS 2 TABLET(S): 8.6 TABLET ORAL at 21:10

## 2022-12-02 RX ADMIN — POLYETHYLENE GLYCOL 3350 17 GRAM(S): 17 POWDER, FOR SOLUTION ORAL at 12:03

## 2022-12-02 RX ADMIN — ATORVASTATIN CALCIUM 80 MILLIGRAM(S): 80 TABLET, FILM COATED ORAL at 21:10

## 2022-12-02 RX ADMIN — LIDOCAINE 1 PATCH: 4 CREAM TOPICAL at 23:27

## 2022-12-02 RX ADMIN — Medication 40 MILLIGRAM(S): at 05:25

## 2022-12-02 RX ADMIN — Medication 325 MILLIGRAM(S): at 12:02

## 2022-12-02 RX ADMIN — Medication 1 MILLIGRAM(S): at 12:02

## 2022-12-02 RX ADMIN — SODIUM CHLORIDE 3 MILLILITER(S): 9 INJECTION INTRAMUSCULAR; INTRAVENOUS; SUBCUTANEOUS at 21:10

## 2022-12-02 RX ADMIN — Medication 50 MILLIGRAM(S): at 17:02

## 2022-12-02 RX ADMIN — LIDOCAINE 1 PATCH: 4 CREAM TOPICAL at 12:03

## 2022-12-02 RX ADMIN — SODIUM CHLORIDE 3 MILLILITER(S): 9 INJECTION INTRAMUSCULAR; INTRAVENOUS; SUBCUTANEOUS at 14:44

## 2022-12-02 RX ADMIN — SODIUM CHLORIDE 3 MILLILITER(S): 9 INJECTION INTRAMUSCULAR; INTRAVENOUS; SUBCUTANEOUS at 05:23

## 2022-12-02 RX ADMIN — HEPARIN SODIUM 5000 UNIT(S): 5000 INJECTION INTRAVENOUS; SUBCUTANEOUS at 17:01

## 2022-12-02 RX ADMIN — OXYCODONE HYDROCHLORIDE 5 MILLIGRAM(S): 5 TABLET ORAL at 06:20

## 2022-12-02 RX ADMIN — Medication 112 MICROGRAM(S): at 05:25

## 2022-12-02 RX ADMIN — PANTOPRAZOLE SODIUM 40 MILLIGRAM(S): 20 TABLET, DELAYED RELEASE ORAL at 05:24

## 2022-12-02 RX ADMIN — Medication 81 MILLIGRAM(S): at 12:02

## 2022-12-02 RX ADMIN — OXYCODONE HYDROCHLORIDE 5 MILLIGRAM(S): 5 TABLET ORAL at 05:26

## 2022-12-02 RX ADMIN — LIDOCAINE 1 PATCH: 4 CREAM TOPICAL at 21:10

## 2022-12-02 RX ADMIN — SERTRALINE 100 MILLIGRAM(S): 25 TABLET, FILM COATED ORAL at 12:02

## 2022-12-02 RX ADMIN — Medication 25 MILLIGRAM(S): at 01:04

## 2022-12-02 RX ADMIN — Medication 50 MILLIGRAM(S): at 05:24

## 2022-12-02 NOTE — PROGRESS NOTE ADULT - SUBJECTIVE AND OBJECTIVE BOX
POD #5 s/p CABG X 3 (LIMA-LAD, SVG-OM1, SVG-OM2), Mitral valve replacement (#27 Epic Plus Valve), and left atrial appendage clipping (45 V Clip)    PAST MEDICAL & SURGICAL HISTORY:  Hypertension  Hyperlipidemia  Hypothyroid  Breast cancer  History of mitral valve prolapse  S/P mastectomy, left    FAMILY HISTORY:  Family history of systemic lupus erythematosus (Child)  Family history of CVA (Grandparent)    Brief Hospital Course: 82 year old female with a medical history of CAD, LBBB, HTN, HLD admitted to [facility] after experiencing acute onset chest pain, found to have NSTEMI, s/p cardiac cath that revealed triple vessel CAD with IABP placed, placed on Heparin gtt and loaded with Plavix.  TTE also confirmed Severe MR. Patient transferred to [facility] ICU for surgical evaluation and medical management on . IABP d/c'd  with subsequent hypotension, bradycardia, +NSTEMI, with patient urgently brought to cath lab and IABP reinserted in subsequent groin.  Pt transferred to CTICU under Dr. Ackerman service. Patient underwent CABG X 3, MVR(t), DEVONTE Clip on 22. IABP removed . Postoperative course significant for a slow wean of primacor for cardiogenic shock and levophed for hypotension, ABLA requiring blood, acute hypoxic respiratory failure (since resolved), hypervolemia (resolving with diuretics), and CHAVO due to cardiorenal syndrome (resolving).    Significant recent/past 24 hr events: No overnight events reported.    Subjective: Patient lying in bed in NAD. +Tolerating diet. +Passing gas. +Pain currently controlled. Denies fevers, chills, lightheadedness, dizziness, HA, CP, palpitations, SOB, cough, abdominal pain, N/V, diarrhea, numbness/tingling in extremities, or any other acute complaints. ROS negative x 10 systems except as noted above.    MEDICATIONS  (STANDING):  ascorbic acid 500 milliGRAM(s) Oral daily  aspirin enteric coated 81 milliGRAM(s) Oral daily  atorvastatin 80 milliGRAM(s) Oral at bedtime  chlorhexidine 2% Cloths 1 Application(s) Topical <User Schedule>  ferrous    sulfate 325 milliGRAM(s) Oral daily  folic acid 1 milliGRAM(s) Oral daily  furosemide    Tablet 40 milliGRAM(s) Oral daily  heparin   Injectable 5000 Unit(s) SubCutaneous every 12 hours  levothyroxine 112 MICROGram(s) Oral daily  lidocaine   4% Patch 1 Patch Transdermal daily  metoprolol tartrate 50 milliGRAM(s) Oral two times a day  pantoprazole    Tablet 40 milliGRAM(s) Oral before breakfast  polyethylene glycol 3350 17 Gram(s) Oral daily  senna 2 Tablet(s) Oral at bedtime  sertraline 100 milliGRAM(s) Oral daily  sodium chloride 0.9% lock flush 3 milliLiter(s) IV Push every 8 hours    MEDICATIONS  (PRN):  acetaminophen     Tablet .. 650 milliGRAM(s) Oral every 6 hours PRN Temp greater or equal to 38C (100.4F), Mild Pain (1 - 3)  diphenhydrAMINE 25 milliGRAM(s) Oral every 4 hours PRN Rash and/or Itching  oxyCODONE    IR 5 milliGRAM(s) Oral every 6 hours PRN Moderate Pain (4 - 6)    Allergies:  amiodarone (Hives)  iodinated radiocontrast agents (Vomiting; Headache)    Vitals   T(C): 36.8 (01 Dec 2022 21:26), Max: 36.9 (01 Dec 2022 08:00)  T(F): 98.2 (01 Dec 2022 21:26), Max: 98.5 (01 Dec 2022 16:12)  HR: 68 (02 Dec 2022 00:18) (68 - 88)  BP: 117/62 (02 Dec 2022 00:18) (105/65 - 159/70)  BP(mean): 93 (01 Dec 2022 14:00) (93 - 100)  ABP: 96/67 (01 Dec 2022 13:00) (96/67 - 176/62)  ABP(mean): 74 (01 Dec 2022 13:00) (69 - 114)  RR: 18 (02 Dec 2022 00:18) (12 - 25)  SpO2: 96% (02 Dec 2022 00:18) (94% - 96%)  O2 Parameters below as of 02 Dec 2022 00:18  Patient On (Oxygen Delivery Method): room air    I&O's Detail    2022 07:01  -  01 Dec 2022 07:00  --------------------------------------------------------  IN:    Oral Fluid: 720 mL  Total IN: 720 mL    OUT:    Chest Tube (mL): 45 mL    Chest Tube (mL): 165 mL    Indwelling Catheter - Urethral (mL): 1845 mL  Total OUT: 2055 mL    Total NET: -1335 mL      01 Dec 2022 07:01  -  02 Dec 2022 01:50  --------------------------------------------------------  IN:    Oral Fluid: 375 mL  Total IN: 375 mL    OUT:    Chest Tube (mL): 510 mL    Chest Tube (mL): 110 mL    Chest Tube (mL): 0 mL    Indwelling Catheter - Urethral (mL): 500 mL  Total OUT: 1120 mL    Total NET: -745 mL    Physical Exam  Neuro: A+O x 3, non-focal, speech clear and intact  HEENT:  NCAT, No conjuctival edema or icterus, no thrush.    Neck:  Supple, trachea midline  Pulm: +Diminished BSs at bases bilaterally, no accessory muscle use noted  Chest:        mediastinal CT, left pleural CT, and right pigtail catheter all with dressings intact and no air leak, no subQ emphysema noted       +PW (settings: VVI, rate: 40, mA: 10, sensitivity: 0.8)  CV: regular rate, regular rhythm, +S1S2, no murmur or rub noted  Abd: soft, NT, ND, + BS  : marrufo in situ to bedside drainage  Ext: MOSHER x 4, +1 LE edema b/l, no cyanosis or clubbing, distal motor/neuro/circ intact  Skin: warm, dry, perfused  Incisions: midsternal mepilex C/D/I, sternum stable, LLE C/D/I w/ mepilex dressing     LABS                        8.5    16.28 )-----------( 101      ( 01 Dec 2022 02:10 )             25.7     12-    139  |  105  |  44.6<H>  ----------------------------<  106<H>  3.9   |  22.0  |  1.59<H>    Ca    8.0<L>      01 Dec 2022 02:10  Phos  3.1     11-30  Mg     2.4     12    TPro  4.9<L>  /  Alb  2.9<L>  /  TBili  0.9  /  DBili  0.3  /  AST  34<H>  /  ALT  31  /  AlkPhos  150<H>  12    PT/INR - ( 01 Dec 2022 02:10 )   PT: 13.2 sec;   INR: 1.14 ratio      PTT - ( 01 Dec 2022 02:10 )  PTT:25.2 sec    Urinalysis Basic - ( 01 Dec 2022 00:15 )  Color: Yellow / Appearance: Clear / S.015 / pH: x  Gluc: x / Ketone: Negative  / Bili: Negative / Urobili: Negative mg/dL   Blood: x / Protein: Negative / Nitrite: Negative   Leuk Esterase: Negative / RBC: 0-2 /HPF / WBC 0-2 /HPF   Sq Epi: x / Non Sq Epi: Occasional / Bacteria: Occasional    POCT Blood Glucose.: 114 mg/dL (22 @ 08:25)      Last CXR:  < from: Xray Chest 1 View- PORTABLE-Urgent (Xray Chest 1 View- PORTABLE-Urgent .) (22 @ 15:06) >  IMPRESSION: Latest film shows catheter right chest tube inserted with diminished right effusion. Persistent mild left base findings.  < end of copied text >

## 2022-12-02 NOTE — PROGRESS NOTE ADULT - SUBJECTIVE AND OBJECTIVE BOX
Reason for visit: CHAVO    Subjective: No acute overnight event. Patient denied any cardiac or urinary complains. No fever/chills.     ROS: All systems were reviewed in detail pertinent positive and negative mentioned above, rest are negative.    Physical Exam:  Gen: no acute distress  MS: alert, conversing normally  Eyes: EOMI, no icterus  HENT: NCAT, MMM  CV: rhythm reg reg, rate normal, no m/g/r  Chest: CTAB, no w/r/r,  Abd: soft, NT, ND  Extremities: No edema    =======================================================  Vital Signs Last 24 Hrs  T(C): 36.5 (02 Dec 2022 16:16), Max: 36.8 (01 Dec 2022 21:26)  T(F): 97.7 (02 Dec 2022 16:16), Max: 98.2 (01 Dec 2022 21:26)  HR: 80 (02 Dec 2022 16:16) (67 - 80)  BP: 110/58 (02 Dec 2022 16:16) (105/65 - 137/79)  BP(mean): --  RR: 18 (02 Dec 2022 16:16) (18 - 18)  SpO2: 94% (02 Dec 2022 16:16) (94% - 96%)    Parameters below as of 02 Dec 2022 16:16  Patient On (Oxygen Delivery Method): room air      I&O's Summary    01 Dec 2022 07:01  -  02 Dec 2022 07:00  --------------------------------------------------------  IN: 475 mL / OUT: 1450 mL / NET: -975 mL    02 Dec 2022 07:01  -  02 Dec 2022 20:03  --------------------------------------------------------  IN: 960 mL / OUT: 1320 mL / NET: -360 mL      =======================================================  Current Antibiotics:    Other medications:  ascorbic acid 500 milliGRAM(s) Oral daily  aspirin enteric coated 81 milliGRAM(s) Oral daily  atorvastatin 80 milliGRAM(s) Oral at bedtime  chlorhexidine 2% Cloths 1 Application(s) Topical <User Schedule>  ferrous    sulfate 325 milliGRAM(s) Oral daily  folic acid 1 milliGRAM(s) Oral daily  furosemide    Tablet 40 milliGRAM(s) Oral daily  heparin   Injectable 5000 Unit(s) SubCutaneous every 12 hours  levothyroxine 112 MICROGram(s) Oral daily  lidocaine   4% Patch 1 Patch Transdermal daily  metoprolol tartrate 50 milliGRAM(s) Oral two times a day  pantoprazole    Tablet 40 milliGRAM(s) Oral before breakfast  polyethylene glycol 3350 17 Gram(s) Oral daily  senna 2 Tablet(s) Oral at bedtime  sertraline 100 milliGRAM(s) Oral daily  sodium chloride 0.9% lock flush 3 milliLiter(s) IV Push every 8 hours    =======================================================      131<L>  |  95<L>  |  48.4<H>  ----------------------------<  108<H>  4.1   |  23.0  |  1.46<H>    Ca    8.2<L>      02 Dec 2022 06:56  Mg     2.3         TPro  4.9<L>  /  Alb  2.9<L>  /  TBili  0.9  /  DBili  0.3  /  AST  34<H>  /  ALT  31  /  AlkPhos  150<H>      Creatinine, Serum: 1.46 mg/dL (22 @ 06:56)  Creatinine, Serum: 1.59 mg/dL (22 @ 02:10)  Creatinine, Serum: 1.73 mg/dL (22 @ 15:00)  Creatinine, Serum: 1.58 mg/dL (22 @ 02:00)  Creatinine, Serum: 1.56 mg/dL (22 @ 15:15)  Creatinine, Serum: 1.30 mg/dL (22 @ 02:04)  Creatinine, Serum: 0.83 mg/dL (22 @ 02:00)    Urinalysis Basic - ( 01 Dec 2022 00:15 )    Color: Yellow / Appearance: Clear / S.015 / pH: x  Gluc: x / Ketone: Negative  / Bili: Negative / Urobili: Negative mg/dL   Blood: x / Protein: Negative / Nitrite: Negative   Leuk Esterase: Negative / RBC: 0-2 /HPF / WBC 0-2 /HPF   Sq Epi: x / Non Sq Epi: Occasional / Bacteria: Occasional      =======================================================

## 2022-12-02 NOTE — PROGRESS NOTE ADULT - SUBJECTIVE AND OBJECTIVE BOX
Patient fatigued, but breathing better.  Reports pain is controlled.     REVIEW OF SYSTEMS  Constitutional - No fever,  +fatigue  Neurological - +loss of strength   Musculoskeletal - No joint pain, +joint swelling, No muscle pain    FUNCTIONAL PROGRESS   PT  Sit-Stand Transfer Training  Sit-to-Stand Transfer Training Rehab Potential: good, to achieve stated therapy goals  Transfer Training Sit-to-Stand Transfer: moderate assist (50% patient effort);  1 person assist;  verbal cues  Transfer Training Stand-to-Sit Transfer: moderate assist (50% patient effort);  1 person assist;  verbal cues  Sit-to-Stand Transfer Training Transfer Safety Analysis: decreased balance;  impaired balance;  decreased strength    Gait Training  Gait Training Rehab Potential: good, to achieve stated therapy goals  Gait Training: moderate assist (50% patient effort);  1 person assist;  verbal cues;  rolling walker;  5 feet  Gait Analysis: shuffling;  impaired balance;  decreased strength;  5 feet;  rolling walker    VITALS  T(C): 36.7 (22 @ 04:39), Max: 36.9 (22 @ 13:00)  HR: 67 (22 @ 04:39) (67 - 88)  BP: 108/68 (22 @ 04:39) (105/65 - 146/76)  RR: 18 (22 @ 04:39) (18 - 25)  SpO2: 96% (22 @ 04:39) (94% - 96%)  Wt(kg): --    MEDICATIONS   acetaminophen     Tablet .. 650 milliGRAM(s) every 6 hours PRN  ascorbic acid 500 milliGRAM(s) daily  aspirin enteric coated 81 milliGRAM(s) daily  atorvastatin 80 milliGRAM(s) at bedtime  chlorhexidine 2% Cloths 1 Application(s) <User Schedule>  diphenhydrAMINE 25 milliGRAM(s) every 4 hours PRN  ferrous    sulfate 325 milliGRAM(s) daily  folic acid 1 milliGRAM(s) daily  furosemide    Tablet 40 milliGRAM(s) daily  heparin   Injectable 5000 Unit(s) every 12 hours  levothyroxine 112 MICROGram(s) daily  lidocaine   4% Patch 1 Patch daily  metoprolol tartrate 50 milliGRAM(s) two times a day  oxyCODONE    IR 5 milliGRAM(s) every 6 hours PRN  pantoprazole    Tablet 40 milliGRAM(s) before breakfast  polyethylene glycol 3350 17 Gram(s) daily  senna 2 Tablet(s) at bedtime  sertraline 100 milliGRAM(s) daily  sodium chloride 0.9% lock flush 3 milliLiter(s) every 8 hours      RECENT LABS/IMAGING                          10.1   17.46 )-----------( 77       ( 02 Dec 2022 06:56 )             32.3     12-02    131<L>  |  95<L>  |  48.4<H>  ----------------------------<  108<H>  4.1   |  23.0  |  1.46<H>    Ca    8.2<L>      02 Dec 2022 06:56  Mg     2.3     12-    TPro  4.9<L>  /  Alb  2.9<L>  /  TBili  0.9  /  DBili  0.3  /  AST  34<H>  /  ALT  31  /  AlkPhos  150<H>  12    PT/INR - ( 02 Dec 2022 06:56 )   PT: 13.8 sec;   INR: 1.19 ratio         PTT - ( 01 Dec 2022 02:10 )  PTT:25.2 sec  Urinalysis Basic - ( 01 Dec 2022 00:15 )    Color: Yellow / Appearance: Clear / S.015 / pH: x  Gluc: x / Ketone: Negative  / Bili: Negative / Urobili: Negative mg/dL   Blood: x / Protein: Negative / Nitrite: Negative   Leuk Esterase: Negative / RBC: 0-2 /HPF / WBC 0-2 /HPF   Sq Epi: x / Non Sq Epi: Occasional / Bacteria: Occasional              CXR  - Latest film shows catheter right chest tube inserted with diminished right effusion. Persistent mild left base findings.      ----------------------------------------------------------------------------------------  PHYSICAL EXAM  Constitutional - NAD, Comfortable  Extremities - No C/C/E, No calf tenderness   Neurologic Exam -                    Cognitive - AAOx4     Motor -                     LEFT    UE - ShAB 4/5, EF 4/5, EE 4/5,  5/5                    RIGHT UE - ShAB 4/5, EF 4/5, EE 4/5,  5/5                    LEFT    LE - HF 1/5, KE 3/5, DF 4/5, PF 4/5                    RIGHT LE - HF 1/5, KE 3/5, DF 4/5, PF 4/5         Sensory - Intact to LT  Psychiatric - Mood stable, Affect WNL  ----------------------------------------------------------------------------------------  ASSESSMENT/PLAN  82yFemale with functional deficits after having elective CABG x3, MVR and left atrial appendage clipping  Cardiopulmonary - ASA, Lasix, Lopressor   Pain - Tylenol, Oxycodone, Lidoderm  Mood - Zoloft  DVT PPX - SCDs, Heparin  Rehab - Medically being optimized, currently with 3 chest tubes. Goal is to get to [place].     Will continue to follow. Rehab recommendations are dependent on how functional progress changes as well as how patient continues to participate and tolerate therapeutic interventions, which may change. Recommend ongoing mobilization by staff to maintain cardiopulmonary function and prevention of secondary complications related to debility. Discussed with rehab team.

## 2022-12-03 LAB
ANION GAP SERPL CALC-SCNC: 10 MMOL/L — SIGNIFICANT CHANGE UP (ref 5–17)
BUN SERPL-MCNC: 48.6 MG/DL — HIGH (ref 8–20)
CALCIUM SERPL-MCNC: 8.4 MG/DL — SIGNIFICANT CHANGE UP (ref 8.4–10.5)
CHLORIDE SERPL-SCNC: 98 MMOL/L — SIGNIFICANT CHANGE UP (ref 96–108)
CO2 SERPL-SCNC: 27 MMOL/L — SIGNIFICANT CHANGE UP (ref 22–29)
CREAT SERPL-MCNC: 1.35 MG/DL — HIGH (ref 0.5–1.3)
EGFR: 39 ML/MIN/1.73M2 — LOW
GLUCOSE SERPL-MCNC: 107 MG/DL — HIGH (ref 70–99)
HCT VFR BLD CALC: 31.3 % — LOW (ref 34.5–45)
HGB BLD-MCNC: 10.4 G/DL — LOW (ref 11.5–15.5)
INR BLD: 1.12 RATIO — SIGNIFICANT CHANGE UP (ref 0.88–1.16)
MAGNESIUM SERPL-MCNC: 2.2 MG/DL — SIGNIFICANT CHANGE UP (ref 1.6–2.6)
MCHC RBC-ENTMCNC: 30.6 PG — SIGNIFICANT CHANGE UP (ref 27–34)
MCHC RBC-ENTMCNC: 33.2 GM/DL — SIGNIFICANT CHANGE UP (ref 32–36)
MCV RBC AUTO: 92.1 FL — SIGNIFICANT CHANGE UP (ref 80–100)
PLATELET # BLD AUTO: 85 K/UL — LOW (ref 150–400)
POTASSIUM SERPL-MCNC: 4.1 MMOL/L — SIGNIFICANT CHANGE UP (ref 3.5–5.3)
POTASSIUM SERPL-SCNC: 4.1 MMOL/L — SIGNIFICANT CHANGE UP (ref 3.5–5.3)
PROTHROM AB SERPL-ACNC: 13 SEC — SIGNIFICANT CHANGE UP (ref 10.5–13.4)
RBC # BLD: 3.4 M/UL — LOW (ref 3.8–5.2)
RBC # FLD: 16.7 % — HIGH (ref 10.3–14.5)
SODIUM SERPL-SCNC: 135 MMOL/L — SIGNIFICANT CHANGE UP (ref 135–145)
WBC # BLD: 15.07 K/UL — HIGH (ref 3.8–10.5)
WBC # FLD AUTO: 15.07 K/UL — HIGH (ref 3.8–10.5)

## 2022-12-03 PROCEDURE — 99232 SBSQ HOSP IP/OBS MODERATE 35: CPT

## 2022-12-03 PROCEDURE — 71045 X-RAY EXAM CHEST 1 VIEW: CPT | Mod: 26

## 2022-12-03 PROCEDURE — 71045 X-RAY EXAM CHEST 1 VIEW: CPT | Mod: 26,77

## 2022-12-03 RX ADMIN — Medication 1 MILLIGRAM(S): at 09:21

## 2022-12-03 RX ADMIN — HEPARIN SODIUM 5000 UNIT(S): 5000 INJECTION INTRAVENOUS; SUBCUTANEOUS at 17:27

## 2022-12-03 RX ADMIN — POLYETHYLENE GLYCOL 3350 17 GRAM(S): 17 POWDER, FOR SOLUTION ORAL at 09:22

## 2022-12-03 RX ADMIN — Medication 112 MICROGRAM(S): at 05:17

## 2022-12-03 RX ADMIN — SENNA PLUS 2 TABLET(S): 8.6 TABLET ORAL at 21:29

## 2022-12-03 RX ADMIN — SERTRALINE 100 MILLIGRAM(S): 25 TABLET, FILM COATED ORAL at 09:22

## 2022-12-03 RX ADMIN — Medication 500 MILLIGRAM(S): at 09:21

## 2022-12-03 RX ADMIN — SODIUM CHLORIDE 3 MILLILITER(S): 9 INJECTION INTRAMUSCULAR; INTRAVENOUS; SUBCUTANEOUS at 13:48

## 2022-12-03 RX ADMIN — Medication 50 MILLIGRAM(S): at 17:27

## 2022-12-03 RX ADMIN — Medication 40 MILLIGRAM(S): at 05:17

## 2022-12-03 RX ADMIN — Medication 50 MILLIGRAM(S): at 05:19

## 2022-12-03 RX ADMIN — CHLORHEXIDINE GLUCONATE 1 APPLICATION(S): 213 SOLUTION TOPICAL at 05:18

## 2022-12-03 RX ADMIN — HEPARIN SODIUM 5000 UNIT(S): 5000 INJECTION INTRAVENOUS; SUBCUTANEOUS at 05:16

## 2022-12-03 RX ADMIN — ATORVASTATIN CALCIUM 80 MILLIGRAM(S): 80 TABLET, FILM COATED ORAL at 21:29

## 2022-12-03 RX ADMIN — SODIUM CHLORIDE 3 MILLILITER(S): 9 INJECTION INTRAMUSCULAR; INTRAVENOUS; SUBCUTANEOUS at 21:10

## 2022-12-03 RX ADMIN — SODIUM CHLORIDE 3 MILLILITER(S): 9 INJECTION INTRAMUSCULAR; INTRAVENOUS; SUBCUTANEOUS at 05:18

## 2022-12-03 RX ADMIN — Medication 81 MILLIGRAM(S): at 09:21

## 2022-12-03 RX ADMIN — Medication 325 MILLIGRAM(S): at 12:12

## 2022-12-03 RX ADMIN — PANTOPRAZOLE SODIUM 40 MILLIGRAM(S): 20 TABLET, DELAYED RELEASE ORAL at 05:17

## 2022-12-03 NOTE — PROGRESS NOTE ADULT - SUBJECTIVE AND OBJECTIVE BOX
POD #6 s/p CABG X 3 (LIMA-LAD, SVG-OM1, SVG-OM2), Mitral valve replacement (#27 Epic Plus Valve), and left atrial appendage clipping (45 V Clip)    PAST MEDICAL & SURGICAL HISTORY:  Hypertension  Hyperlipidemia  Hypothyroid  Breast cancer  History of mitral valve prolapse  S/P mastectomy, left    FAMILY HISTORY:  Family history of systemic lupus erythematosus (Child)  Family history of CVA (Grandparent)    Brief Hospital Course: 82 year old female with a medical history of CAD, LBBB, HTN, HLD admitted to [facility] after experiencing acute onset chest pain, found to have NSTEMI, s/p cardiac cath that revealed triple vessel CAD with IABP placed, placed on Heparin gtt and loaded with Plavix.  TTE also confirmed Severe MR. Patient transferred to [facility] ICU for surgical evaluation and medical management on 11/23. IABP d/c'd 11/25 with subsequent hypotension, bradycardia, +NSTEMI, with patient urgently brought to cath lab and IABP reinserted in subsequent groin.  Pt transferred to CTICU under Dr. Ackerman service. Patient underwent CABG X 3, MVR(t), DEVONTE Clip on 11/27/22. IABP removed 11/28. Postoperative course significant for a slow wean of primacor for cardiogenic shock and levophed for hypotension, ABLA requiring blood, acute hypoxic respiratory failure (since resolved), hypervolemia (resolving with diuretics), and CHAVO due to cardiorenal syndrome (resolving).    Significant recent/past 24 hr events: No overnight events reported.    Subjective: Patient lying in bed in NAD. +Tolerating diet. +Passing gas. +Pain currently controlled. Denies fevers, chills, lightheadedness, dizziness, HA, CP, palpitations, SOB, cough, abdominal pain, N/V, diarrhea, numbness/tingling in extremities, or any other acute complaints. ROS negative x 10 systems except as noted above.    MEDICATIONS  (STANDING):  ascorbic acid 500 milliGRAM(s) Oral daily  aspirin enteric coated 81 milliGRAM(s) Oral daily  atorvastatin 80 milliGRAM(s) Oral at bedtime  chlorhexidine 2% Cloths 1 Application(s) Topical <User Schedule>  ferrous    sulfate 325 milliGRAM(s) Oral daily  folic acid 1 milliGRAM(s) Oral daily  furosemide    Tablet 40 milliGRAM(s) Oral daily  heparin   Injectable 5000 Unit(s) SubCutaneous every 12 hours  levothyroxine 112 MICROGram(s) Oral daily  lidocaine   4% Patch 1 Patch Transdermal daily  metoprolol tartrate 50 milliGRAM(s) Oral two times a day  pantoprazole    Tablet 40 milliGRAM(s) Oral before breakfast  polyethylene glycol 3350 17 Gram(s) Oral daily  senna 2 Tablet(s) Oral at bedtime  sertraline 100 milliGRAM(s) Oral daily  sodium chloride 0.9% lock flush 3 milliLiter(s) IV Push every 8 hours    MEDICATIONS  (PRN):  acetaminophen     Tablet .. 650 milliGRAM(s) Oral every 6 hours PRN Temp greater or equal to 38C (100.4F), Mild Pain (1 - 3)  diphenhydrAMINE 25 milliGRAM(s) Oral every 4 hours PRN Rash and/or Itching  oxyCODONE    IR 5 milliGRAM(s) Oral every 6 hours PRN Moderate Pain (4 - 6)    Allergies:  amiodarone (Hives)  iodinated radiocontrast agents (Vomiting; Headache)    Vitals   T(C): 36.6 (02 Dec 2022 21:06), Max: 36.7 (02 Dec 2022 04:39)  T(F): 97.8 (02 Dec 2022 21:06), Max: 98 (02 Dec 2022 04:39)  HR: 72 (02 Dec 2022 21:06) (67 - 80)  BP: 108/72 (02 Dec 2022 21:06) (108/68 - 137/79)  RR: 18 (02 Dec 2022 21:06) (18 - 18)  SpO2: 96% (02 Dec 2022 21:06) (94% - 96%)  O2 Parameters below as of 02 Dec 2022 21:06  Patient On (Oxygen Delivery Method): room air    I&O's Detail    01 Dec 2022 07:01  -  02 Dec 2022 07:00  --------------------------------------------------------  IN:    Oral Fluid: 475 mL  Total IN: 475 mL    OUT:    Chest Tube (mL): 0 mL    Chest Tube (mL): 520 mL    Chest Tube (mL): 130 mL    Indwelling Catheter - Urethral (mL): 800 mL  Total OUT: 1450 mL    Total NET: -975 mL      02 Dec 2022 07:01  -  03 Dec 2022 00:44  --------------------------------------------------------  IN:    Oral Fluid: 1060 mL  Total IN: 1060 mL    OUT:    Chest Tube (mL): 20 mL    Indwelling Catheter - Urethral (mL): 800 mL    Voided (mL): 800 mL  Total OUT: 1620 mL    Total NET: -560 mL    Physical Exam  Neuro: A+O x 3, non-focal, speech clear and intact  HEENT:  NCAT, No conjuctival edema or icterus, no thrush.    Neck:  Supple, trachea midline  Pulm: +Diminished BSs at bases bilaterally, no accessory muscle use noted  Chest:        Right pigtail catheter with dressing intact and no air leak, no subQ emphysema noted       +PW (isolated)  CV: regular rate, regular rhythm, +S1S2, no murmur or rub noted  Abd: soft, NT, ND, + BS  Ext: MOSHER x 4, +1-2 LE edema b/l, no cyanosis or clubbing, distal motor/neuro/circ intact  Skin: warm, dry, perfused  Incisions: midsternal mepilex C/D/I, sternum stable, LLE C/D/I w/ mepilex dressing     LABS                        10.1   17.46 )-----------( 77       ( 02 Dec 2022 06:56 )             32.3     12-02    131<L>  |  95<L>  |  48.4<H>  ----------------------------<  108<H>  4.1   |  23.0  |  1.46<H>    Ca    8.2<L>      02 Dec 2022 06:56  Mg     2.3     12-02    TPro  4.9<L>  /  Alb  2.9<L>  /  TBili  0.9  /  DBili  0.3  /  AST  34<H>  /  ALT  31  /  AlkPhos  150<H>  12-01    PT/INR - ( 02 Dec 2022 06:56 )   PT: 13.8 sec;   INR: 1.19 ratio      PTT - ( 01 Dec 2022 02:10 )  PTT:25.2 sec      Last CXR:  < from: Xray Chest 1 View- PORTABLE-Urgent (Xray Chest 1 View- PORTABLE-Urgent .) (12.02.22 @ 15:52) >  IMPRESSION: Uneventful removal of left chest tube.  < end of copied text >

## 2022-12-03 NOTE — PROGRESS NOTE ADULT - SUBJECTIVE AND OBJECTIVE BOX
Denied SOB. Still with R chest tube.     Vital Signs Last 24 Hrs  T(C): 36.4 (03 Dec 2022 17:08), Max: 36.7 (03 Dec 2022 05:00)  T(F): 97.6 (03 Dec 2022 17:08), Max: 98 (03 Dec 2022 05:00)  HR: 83 (03 Dec 2022 17:08) (72 - 83)  BP: 125/73 (03 Dec 2022 17:08) (108/72 - 129/68)  BP(mean): --  RR: 15 (03 Dec 2022 17:08) (15 - 18)  SpO2: 96% (03 Dec 2022 17:08) (96% - 97%)    Parameters below as of 03 Dec 2022 17:08  Patient On (Oxygen Delivery Method): room air    I&O's Summary    02 Dec 2022 07:01  -  03 Dec 2022 07:00  --------------------------------------------------------  IN: 1160 mL / OUT: 2140 mL / NET: -980 mL    Physical Exam  General: WDWN female in NAD  Cardiac: S1S2 RRR  Respiratory: CTAB  Abdomen: Soft, NT  Extremities: 2+ pitting edema of b/l LE  Neuro: AAOx3    12-03    135  |  98  |  48.6<H>  ----------------------------<  107<H>  4.1   |  27.0  |  1.35<H>    Ca    8.4      03 Dec 2022 06:22  Mg     2.2     12-03                        10.4   15.07 )-----------( 85       ( 03 Dec 2022 06:22 )             31.3     MEDICATIONS  (STANDING):  ascorbic acid 500 milliGRAM(s) Oral daily  aspirin enteric coated 81 milliGRAM(s) Oral daily  atorvastatin 80 milliGRAM(s) Oral at bedtime  chlorhexidine 2% Cloths 1 Application(s) Topical <User Schedule>  ferrous    sulfate 325 milliGRAM(s) Oral daily  folic acid 1 milliGRAM(s) Oral daily  furosemide    Tablet 40 milliGRAM(s) Oral daily  heparin   Injectable 5000 Unit(s) SubCutaneous every 12 hours  levothyroxine 112 MICROGram(s) Oral daily  lidocaine   4% Patch 1 Patch Transdermal daily  metoprolol tartrate 50 milliGRAM(s) Oral two times a day  pantoprazole    Tablet 40 milliGRAM(s) Oral before breakfast  polyethylene glycol 3350 17 Gram(s) Oral daily  senna 2 Tablet(s) Oral at bedtime  sertraline 100 milliGRAM(s) Oral daily  sodium chloride 0.9% lock flush 3 milliLiter(s) IV Push every 8 hours    MEDICATIONS  (PRN):  acetaminophen     Tablet .. 650 milliGRAM(s) Oral every 6 hours PRN Temp greater or equal to 38C (100.4F), Mild Pain (1 - 3)  diphenhydrAMINE 25 milliGRAM(s) Oral every 4 hours PRN Rash and/or Itching  oxyCODONE    IR 5 milliGRAM(s) Oral every 6 hours PRN Moderate Pain (4 - 6)

## 2022-12-04 LAB
ANION GAP SERPL CALC-SCNC: 11 MMOL/L — SIGNIFICANT CHANGE UP (ref 5–17)
BUN SERPL-MCNC: 44.8 MG/DL — HIGH (ref 8–20)
CALCIUM SERPL-MCNC: 8.2 MG/DL — LOW (ref 8.4–10.5)
CHLORIDE SERPL-SCNC: 101 MMOL/L — SIGNIFICANT CHANGE UP (ref 96–108)
CO2 SERPL-SCNC: 24 MMOL/L — SIGNIFICANT CHANGE UP (ref 22–29)
CREAT SERPL-MCNC: 1.02 MG/DL — SIGNIFICANT CHANGE UP (ref 0.5–1.3)
EGFR: 55 ML/MIN/1.73M2 — LOW
GLUCOSE SERPL-MCNC: 110 MG/DL — HIGH (ref 70–99)
HCT VFR BLD CALC: 29 % — LOW (ref 34.5–45)
HGB BLD-MCNC: 9.7 G/DL — LOW (ref 11.5–15.5)
INR BLD: 1.14 RATIO — SIGNIFICANT CHANGE UP (ref 0.88–1.16)
MAGNESIUM SERPL-MCNC: 2 MG/DL — SIGNIFICANT CHANGE UP (ref 1.6–2.6)
MCHC RBC-ENTMCNC: 30.7 PG — SIGNIFICANT CHANGE UP (ref 27–34)
MCHC RBC-ENTMCNC: 33.4 GM/DL — SIGNIFICANT CHANGE UP (ref 32–36)
MCV RBC AUTO: 91.8 FL — SIGNIFICANT CHANGE UP (ref 80–100)
PLATELET # BLD AUTO: 67 K/UL — LOW (ref 150–400)
POTASSIUM SERPL-MCNC: 4.3 MMOL/L — SIGNIFICANT CHANGE UP (ref 3.5–5.3)
POTASSIUM SERPL-SCNC: 4.3 MMOL/L — SIGNIFICANT CHANGE UP (ref 3.5–5.3)
PROTHROM AB SERPL-ACNC: 13.2 SEC — SIGNIFICANT CHANGE UP (ref 10.5–13.4)
RBC # BLD: 3.16 M/UL — LOW (ref 3.8–5.2)
RBC # FLD: 16.8 % — HIGH (ref 10.3–14.5)
SARS-COV-2 RNA SPEC QL NAA+PROBE: SIGNIFICANT CHANGE UP
SODIUM SERPL-SCNC: 136 MMOL/L — SIGNIFICANT CHANGE UP (ref 135–145)
WBC # BLD: 16.03 K/UL — HIGH (ref 3.8–10.5)
WBC # FLD AUTO: 16.03 K/UL — HIGH (ref 3.8–10.5)

## 2022-12-04 PROCEDURE — 71045 X-RAY EXAM CHEST 1 VIEW: CPT | Mod: 26

## 2022-12-04 RX ORDER — MEGESTROL ACETATE 40 MG/ML
400 SUSPENSION ORAL DAILY
Refills: 0 | Status: DISCONTINUED | OUTPATIENT
Start: 2022-12-04 | End: 2022-12-04

## 2022-12-04 RX ORDER — APIXABAN 2.5 MG/1
2.5 TABLET, FILM COATED ORAL EVERY 12 HOURS
Refills: 0 | Status: DISCONTINUED | OUTPATIENT
Start: 2022-12-04 | End: 2022-12-06

## 2022-12-04 RX ORDER — MEGESTROL ACETATE 40 MG/ML
400 SUSPENSION ORAL DAILY
Refills: 0 | Status: DISCONTINUED | OUTPATIENT
Start: 2022-12-04 | End: 2022-12-06

## 2022-12-04 RX ADMIN — Medication 325 MILLIGRAM(S): at 08:54

## 2022-12-04 RX ADMIN — Medication 1 MILLIGRAM(S): at 08:54

## 2022-12-04 RX ADMIN — PANTOPRAZOLE SODIUM 40 MILLIGRAM(S): 20 TABLET, DELAYED RELEASE ORAL at 05:17

## 2022-12-04 RX ADMIN — Medication 500 MILLIGRAM(S): at 08:55

## 2022-12-04 RX ADMIN — MEGESTROL ACETATE 400 MILLIGRAM(S): 40 SUSPENSION ORAL at 11:33

## 2022-12-04 RX ADMIN — Medication 40 MILLIGRAM(S): at 05:17

## 2022-12-04 RX ADMIN — APIXABAN 2.5 MILLIGRAM(S): 2.5 TABLET, FILM COATED ORAL at 17:18

## 2022-12-04 RX ADMIN — SERTRALINE 100 MILLIGRAM(S): 25 TABLET, FILM COATED ORAL at 08:54

## 2022-12-04 RX ADMIN — Medication 650 MILLIGRAM(S): at 05:16

## 2022-12-04 RX ADMIN — Medication 50 MILLIGRAM(S): at 17:18

## 2022-12-04 RX ADMIN — Medication 50 MILLIGRAM(S): at 05:17

## 2022-12-04 RX ADMIN — Medication 650 MILLIGRAM(S): at 06:16

## 2022-12-04 RX ADMIN — SENNA PLUS 2 TABLET(S): 8.6 TABLET ORAL at 21:40

## 2022-12-04 RX ADMIN — Medication 81 MILLIGRAM(S): at 08:54

## 2022-12-04 RX ADMIN — CHLORHEXIDINE GLUCONATE 1 APPLICATION(S): 213 SOLUTION TOPICAL at 06:47

## 2022-12-04 RX ADMIN — SODIUM CHLORIDE 3 MILLILITER(S): 9 INJECTION INTRAMUSCULAR; INTRAVENOUS; SUBCUTANEOUS at 06:46

## 2022-12-04 RX ADMIN — POLYETHYLENE GLYCOL 3350 17 GRAM(S): 17 POWDER, FOR SOLUTION ORAL at 08:54

## 2022-12-04 RX ADMIN — ATORVASTATIN CALCIUM 80 MILLIGRAM(S): 80 TABLET, FILM COATED ORAL at 21:40

## 2022-12-04 RX ADMIN — MEGESTROL ACETATE 400 MILLIGRAM(S): 40 SUSPENSION ORAL at 15:41

## 2022-12-04 RX ADMIN — SODIUM CHLORIDE 3 MILLILITER(S): 9 INJECTION INTRAMUSCULAR; INTRAVENOUS; SUBCUTANEOUS at 13:33

## 2022-12-04 RX ADMIN — HEPARIN SODIUM 5000 UNIT(S): 5000 INJECTION INTRAVENOUS; SUBCUTANEOUS at 05:17

## 2022-12-04 RX ADMIN — SODIUM CHLORIDE 3 MILLILITER(S): 9 INJECTION INTRAMUSCULAR; INTRAVENOUS; SUBCUTANEOUS at 21:25

## 2022-12-04 RX ADMIN — Medication 112 MICROGRAM(S): at 05:17

## 2022-12-04 NOTE — PROGRESS NOTE ADULT - SUBJECTIVE AND OBJECTIVE BOX
POD #7 s/p CABG X 3 (LIMA-LAD, SVG-OM1, SVG-OM2), Mitral valve replacement (#27 Epic Plus Valve), and left atrial appendage clipping (45 V Clip)    PAST MEDICAL & SURGICAL HISTORY:  Hypertension  Hyperlipidemia  Hypothyroid  Breast cancer  History of mitral valve prolapse  S/P mastectomy, left    FAMILY HISTORY:  Family history of systemic lupus erythematosus (Child)  Family history of CVA (Grandparent)    Brief Hospital Course: 82 year old female with a medical history of CAD, LBBB, HTN, HLD admitted to [facility] after experiencing acute onset chest pain, found to have NSTEMI, s/p cardiac cath that revealed triple vessel CAD with IABP placed, placed on Heparin gtt and loaded with Plavix.  TTE also confirmed Severe MR. Patient transferred to [facility] ICU for surgical evaluation and medical management on 11/23. IABP d/c'd 11/25 with subsequent hypotension, bradycardia, +NSTEMI, with patient urgently brought to cath lab and IABP reinserted in subsequent groin.  Pt transferred to CTICU under Dr. Ackerman service. Patient underwent CABG X 3, MVR(t), DEVONTE Clip on 11/27/22. IABP removed 11/28. Postoperative course significant for a slow wean of primacor for cardiogenic shock and levophed for hypotension, ABLA requiring blood, acute hypoxic respiratory failure (since resolved), hypervolemia (resolving with diuretics), and CHAVO due to cardiorenal syndrome (resolving).    Significant recent/past 24 hr events: No overnight events reported.    Subjective: Patient lying in bed in NAD. +Tolerating diet. +Passing gas. +Pain currently controlled. Denies fevers, chills, lightheadedness, dizziness, HA, CP, palpitations, SOB, cough, abdominal pain, N/V, diarrhea, numbness/tingling in extremities, or any other acute complaints. ROS negative x 10 systems except as noted above.    MEDICATIONS  (STANDING):  ascorbic acid 500 milliGRAM(s) Oral daily  aspirin enteric coated 81 milliGRAM(s) Oral daily  atorvastatin 80 milliGRAM(s) Oral at bedtime  chlorhexidine 2% Cloths 1 Application(s) Topical <User Schedule>  ferrous    sulfate 325 milliGRAM(s) Oral daily  folic acid 1 milliGRAM(s) Oral daily  furosemide    Tablet 40 milliGRAM(s) Oral daily  heparin   Injectable 5000 Unit(s) SubCutaneous every 12 hours  levothyroxine 112 MICROGram(s) Oral daily  lidocaine   4% Patch 1 Patch Transdermal daily  metoprolol tartrate 50 milliGRAM(s) Oral two times a day  pantoprazole    Tablet 40 milliGRAM(s) Oral before breakfast  polyethylene glycol 3350 17 Gram(s) Oral daily  senna 2 Tablet(s) Oral at bedtime  sertraline 100 milliGRAM(s) Oral daily  sodium chloride 0.9% lock flush 3 milliLiter(s) IV Push every 8 hours    MEDICATIONS  (PRN):  acetaminophen     Tablet .. 650 milliGRAM(s) Oral every 6 hours PRN Temp greater or equal to 38C (100.4F), Mild Pain (1 - 3)  diphenhydrAMINE 25 milliGRAM(s) Oral every 4 hours PRN Rash and/or Itching  oxyCODONE    IR 5 milliGRAM(s) Oral every 6 hours PRN Moderate Pain (4 - 6)    Allergies:  amiodarone (Hives)  iodinated radiocontrast agents (Vomiting; Headache)    Vitals   T(C): 36.3 (04 Dec 2022 05:00), Max: 36.6 (03 Dec 2022 20:53)  T(F): 97.3 (04 Dec 2022 05:00), Max: 97.9 (03 Dec 2022 20:53)  HR: 75 (04 Dec 2022 05:00) (74 - 83)  BP: 129/73 (04 Dec 2022 05:00) (119/62 - 129/73)  RR: 18 (04 Dec 2022 05:00) (15 - 18)  SpO2: 96% (04 Dec 2022 05:00) (94% - 97%)  O2 Parameters below as of 04 Dec 2022 05:00  Patient On (Oxygen Delivery Method): room air    I&O's Detail    02 Dec 2022 07:01  -  03 Dec 2022 07:00  --------------------------------------------------------  IN:    Oral Fluid: 1160 mL  Total IN: 1160 mL    OUT:    Chest Tube (mL): 40 mL    Indwelling Catheter - Urethral (mL): 800 mL    Voided (mL): 1300 mL  Total OUT: 2140 mL    Total NET: -980 mL      03 Dec 2022 07:01  -  04 Dec 2022 05:08  --------------------------------------------------------  IN:    Oral Fluid: 1160 mL  Total IN: 1160 mL    OUT:    Voided (mL): 1150 mL  Total OUT: 1150 mL    Total NET: 10 mL    Physical Exam  Neuro: A+O x 3, non-focal, speech clear and intact  HEENT:  NCAT, No conjuctival edema or icterus, no thrush.    Neck:  Supple, trachea midline  Pulm: +Diminished BSs at bases bilaterally, no accessory muscle use noted  Chest: +PW (isolated)  CV: regular rate, regular rhythm, +S1S2, no murmur or rub noted  Abd: soft, NT, ND, + BS  Ext: MOSHER x 4, +2 LE edema b/l, no cyanosis or clubbing, distal motor/neuro/circ intact  Skin: warm, dry, perfused  Incisions: midsternal incision open to air C/D/I, sternum stable, LLE harvest site C/D/I     LABS                        10.4   15.07 )-----------( 85       ( 03 Dec 2022 06:22 )             31.3     12-03    135  |  98  |  48.6<H>  ----------------------------<  107<H>  4.1   |  27.0  |  1.35<H>    Ca    8.4      03 Dec 2022 06:22  Mg     2.2     12-03    PT/INR - ( 03 Dec 2022 06:22 )   PT: 13.0 sec;   INR: 1.12 ratio        Last CXR:  < from: Xray Chest 1 View- PORTABLE-Routine (Xray Chest 1 View- PORTABLE-Routine in AM.) (12.03.22 @ 06:08) >  IMPRESSION:  HEART:difficult to access in this projection. Left atrial appendage clip. Prosthetic mitral valve.  LUNGS: Pigtail catheter right base. Small right effusion. Stable left effusion/infiltrate  BONES: sternotomy wires  < end of copied text >     POD #7 s/p CABG X 3 (LIMA-LAD, SVG-OM1, SVG-OM2), Mitral valve replacement (#27 Epic Plus Valve), and left atrial appendage clipping (45 V Clip)    PAST MEDICAL & SURGICAL HISTORY:  Hypertension  Hyperlipidemia  Hypothyroid  Breast cancer  History of mitral valve prolapse  S/P mastectomy, left    FAMILY HISTORY:  Family history of systemic lupus erythematosus (Child)  Family history of CVA (Grandparent)    Brief Hospital Course: 82 year old female with a medical history of CAD, LBBB, HTN, HLD admitted to [facility] after experiencing acute onset chest pain, found to have NSTEMI, s/p cardiac cath that revealed triple vessel CAD with IABP placed, placed on Heparin gtt and loaded with Plavix.  TTE also confirmed Severe MR. Patient transferred to [facility] ICU for surgical evaluation and medical management on 11/23. IABP d/c'd 11/25 with subsequent hypotension, bradycardia, +NSTEMI, with patient urgently brought to cath lab and IABP reinserted in subsequent groin.  Pt transferred to CTICU under Dr. Ackerman service. Patient underwent CABG X 3, MVR(t), DEVONTE Clip on 11/27/22. IABP removed 11/28. Postoperative course significant for a slow wean of primacor for cardiogenic shock and levophed for hypotension, ABLA requiring blood, acute hypoxic respiratory failure (since resolved), hypervolemia (resolving with diuretics), and CHAVO due to cardiorenal syndrome (resolving).    Significant recent/past 24 hr events: No overnight events reported.    Subjective: Patient lying in bed in NAD. +Tolerating diet. +Passing BMs since surgery. +Pain currently controlled. Denies fevers, chills, lightheadedness, dizziness, HA, CP, palpitations, SOB, cough, abdominal pain, N/V, diarrhea, numbness/tingling in extremities, or any other acute complaints. ROS negative x 10 systems except as noted above.    MEDICATIONS  (STANDING):  ascorbic acid 500 milliGRAM(s) Oral daily  aspirin enteric coated 81 milliGRAM(s) Oral daily  atorvastatin 80 milliGRAM(s) Oral at bedtime  chlorhexidine 2% Cloths 1 Application(s) Topical <User Schedule>  ferrous    sulfate 325 milliGRAM(s) Oral daily  folic acid 1 milliGRAM(s) Oral daily  furosemide    Tablet 40 milliGRAM(s) Oral daily  heparin   Injectable 5000 Unit(s) SubCutaneous every 12 hours  levothyroxine 112 MICROGram(s) Oral daily  lidocaine   4% Patch 1 Patch Transdermal daily  metoprolol tartrate 50 milliGRAM(s) Oral two times a day  pantoprazole    Tablet 40 milliGRAM(s) Oral before breakfast  polyethylene glycol 3350 17 Gram(s) Oral daily  senna 2 Tablet(s) Oral at bedtime  sertraline 100 milliGRAM(s) Oral daily  sodium chloride 0.9% lock flush 3 milliLiter(s) IV Push every 8 hours    MEDICATIONS  (PRN):  acetaminophen     Tablet .. 650 milliGRAM(s) Oral every 6 hours PRN Temp greater or equal to 38C (100.4F), Mild Pain (1 - 3)  diphenhydrAMINE 25 milliGRAM(s) Oral every 4 hours PRN Rash and/or Itching  oxyCODONE    IR 5 milliGRAM(s) Oral every 6 hours PRN Moderate Pain (4 - 6)    Allergies:  amiodarone (Hives)  iodinated radiocontrast agents (Vomiting; Headache)    Vitals   T(C): 36.3 (04 Dec 2022 05:00), Max: 36.6 (03 Dec 2022 20:53)  T(F): 97.3 (04 Dec 2022 05:00), Max: 97.9 (03 Dec 2022 20:53)  HR: 75 (04 Dec 2022 05:00) (74 - 83)  BP: 129/73 (04 Dec 2022 05:00) (119/62 - 129/73)  RR: 18 (04 Dec 2022 05:00) (15 - 18)  SpO2: 96% (04 Dec 2022 05:00) (94% - 97%)  O2 Parameters below as of 04 Dec 2022 05:00  Patient On (Oxygen Delivery Method): room air    I&O's Detail    02 Dec 2022 07:01  -  03 Dec 2022 07:00  --------------------------------------------------------  IN:    Oral Fluid: 1160 mL  Total IN: 1160 mL    OUT:    Chest Tube (mL): 40 mL    Indwelling Catheter - Urethral (mL): 800 mL    Voided (mL): 1300 mL  Total OUT: 2140 mL    Total NET: -980 mL      03 Dec 2022 07:01  -  04 Dec 2022 05:08  --------------------------------------------------------  IN:    Oral Fluid: 1160 mL  Total IN: 1160 mL    OUT:    Voided (mL): 1150 mL  Total OUT: 1150 mL    Total NET: 10 mL    Physical Exam  Neuro: A+O x 3, non-focal, speech clear and intact  HEENT:  NCAT, No conjuctival edema or icterus, no thrush.    Neck:  Supple, trachea midline  Pulm: +Diminished BSs at bases bilaterally, no accessory muscle use noted  Chest: +PW (isolated)  CV: regular rate, regular rhythm, +S1S2, no murmur or rub noted  Abd: soft, NT, ND, + BS  Ext: MOSHER x 4, +2 LE edema b/l, no cyanosis or clubbing, distal motor/neuro/circ intact  Skin: warm, dry, perfused  Incisions: midsternal incision open to air C/D/I, sternum stable, LLE harvest site C/D/I     LABS                        10.4   15.07 )-----------( 85       ( 03 Dec 2022 06:22 )             31.3     12-03    135  |  98  |  48.6<H>  ----------------------------<  107<H>  4.1   |  27.0  |  1.35<H>    Ca    8.4      03 Dec 2022 06:22  Mg     2.2     12-03    PT/INR - ( 03 Dec 2022 06:22 )   PT: 13.0 sec;   INR: 1.12 ratio        Last CXR:  < from: Xray Chest 1 View- PORTABLE-Routine (Xray Chest 1 View- PORTABLE-Routine in AM.) (12.03.22 @ 06:08) >  IMPRESSION:  HEART:difficult to access in this projection. Left atrial appendage clip. Prosthetic mitral valve.  LUNGS: Pigtail catheter right base. Small right effusion. Stable left effusion/infiltrate  BONES: sternotomy wires  < end of copied text >

## 2022-12-05 LAB
ALBUMIN SERPL ELPH-MCNC: 2.9 G/DL — LOW (ref 3.3–5.2)
ALP SERPL-CCNC: 361 U/L — HIGH (ref 40–120)
ALT FLD-CCNC: 114 U/L — HIGH
AMMONIA BLD-MCNC: 15 UMOL/L — SIGNIFICANT CHANGE UP (ref 11–55)
ANION GAP SERPL CALC-SCNC: 10 MMOL/L — SIGNIFICANT CHANGE UP (ref 5–17)
AST SERPL-CCNC: 109 U/L — HIGH
BILIRUB DIRECT SERPL-MCNC: 0.5 MG/DL — HIGH (ref 0–0.3)
BILIRUB INDIRECT FLD-MCNC: 0.9 MG/DL — SIGNIFICANT CHANGE UP (ref 0.2–1)
BILIRUB SERPL-MCNC: 1.4 MG/DL — SIGNIFICANT CHANGE UP (ref 0.4–2)
BUN SERPL-MCNC: 43.8 MG/DL — HIGH (ref 8–20)
CALCIUM SERPL-MCNC: 8.6 MG/DL — SIGNIFICANT CHANGE UP (ref 8.4–10.5)
CHLORIDE SERPL-SCNC: 101 MMOL/L — SIGNIFICANT CHANGE UP (ref 96–108)
CO2 SERPL-SCNC: 28 MMOL/L — SIGNIFICANT CHANGE UP (ref 22–29)
CREAT SERPL-MCNC: 1.07 MG/DL — SIGNIFICANT CHANGE UP (ref 0.5–1.3)
EGFR: 52 ML/MIN/1.73M2 — LOW
GLUCOSE SERPL-MCNC: 105 MG/DL — HIGH (ref 70–99)
HCT VFR BLD CALC: 31.3 % — LOW (ref 34.5–45)
HGB BLD-MCNC: 10.3 G/DL — LOW (ref 11.5–15.5)
MAGNESIUM SERPL-MCNC: 1.9 MG/DL — SIGNIFICANT CHANGE UP (ref 1.6–2.6)
MCHC RBC-ENTMCNC: 30.2 PG — SIGNIFICANT CHANGE UP (ref 27–34)
MCHC RBC-ENTMCNC: 32.9 GM/DL — SIGNIFICANT CHANGE UP (ref 32–36)
MCV RBC AUTO: 91.8 FL — SIGNIFICANT CHANGE UP (ref 80–100)
PLATELET # BLD AUTO: 69 K/UL — LOW (ref 150–400)
POTASSIUM SERPL-MCNC: 4.1 MMOL/L — SIGNIFICANT CHANGE UP (ref 3.5–5.3)
POTASSIUM SERPL-SCNC: 4.1 MMOL/L — SIGNIFICANT CHANGE UP (ref 3.5–5.3)
PREALB SERPL-MCNC: 15 MG/DL — LOW (ref 18–38)
PROT SERPL-MCNC: 5.8 G/DL — LOW (ref 6.6–8.7)
RBC # BLD: 3.41 M/UL — LOW (ref 3.8–5.2)
RBC # FLD: 17.2 % — HIGH (ref 10.3–14.5)
SODIUM SERPL-SCNC: 139 MMOL/L — SIGNIFICANT CHANGE UP (ref 135–145)
T3 SERPL-MCNC: <40 NG/DL — LOW (ref 80–200)
T4 AB SER-ACNC: 4.4 UG/DL — LOW (ref 4.5–12)
TSH SERPL-MCNC: 9.63 UIU/ML — HIGH (ref 0.27–4.2)
WBC # BLD: 16.18 K/UL — HIGH (ref 3.8–10.5)
WBC # FLD AUTO: 16.18 K/UL — HIGH (ref 3.8–10.5)

## 2022-12-05 PROCEDURE — 99233 SBSQ HOSP IP/OBS HIGH 50: CPT

## 2022-12-05 PROCEDURE — 71045 X-RAY EXAM CHEST 1 VIEW: CPT | Mod: 26

## 2022-12-05 PROCEDURE — 93308 TTE F-UP OR LMTD: CPT | Mod: 26

## 2022-12-05 RX ORDER — LEVOTHYROXINE SODIUM 125 MCG
150 TABLET ORAL DAILY
Refills: 0 | Status: DISCONTINUED | OUTPATIENT
Start: 2022-12-05 | End: 2022-12-05

## 2022-12-05 RX ORDER — LEVOTHYROXINE SODIUM 125 MCG
37.5 TABLET ORAL ONCE
Refills: 0 | Status: COMPLETED | OUTPATIENT
Start: 2022-12-05 | End: 2022-12-05

## 2022-12-05 RX ORDER — LEVOTHYROXINE SODIUM 125 MCG
150 TABLET ORAL DAILY
Refills: 0 | Status: DISCONTINUED | OUTPATIENT
Start: 2022-12-06 | End: 2022-12-06

## 2022-12-05 RX ADMIN — SODIUM CHLORIDE 3 MILLILITER(S): 9 INJECTION INTRAMUSCULAR; INTRAVENOUS; SUBCUTANEOUS at 06:24

## 2022-12-05 RX ADMIN — CHLORHEXIDINE GLUCONATE 1 APPLICATION(S): 213 SOLUTION TOPICAL at 06:34

## 2022-12-05 RX ADMIN — SODIUM CHLORIDE 3 MILLILITER(S): 9 INJECTION INTRAMUSCULAR; INTRAVENOUS; SUBCUTANEOUS at 13:22

## 2022-12-05 RX ADMIN — Medication 37.5 MICROGRAM(S): at 10:07

## 2022-12-05 RX ADMIN — Medication 1 MILLIGRAM(S): at 08:26

## 2022-12-05 RX ADMIN — Medication 81 MILLIGRAM(S): at 08:25

## 2022-12-05 RX ADMIN — Medication 40 MILLIGRAM(S): at 06:34

## 2022-12-05 RX ADMIN — Medication 50 MILLIGRAM(S): at 17:21

## 2022-12-05 RX ADMIN — SENNA PLUS 2 TABLET(S): 8.6 TABLET ORAL at 22:07

## 2022-12-05 RX ADMIN — Medication 500 MILLIGRAM(S): at 08:24

## 2022-12-05 RX ADMIN — Medication 325 MILLIGRAM(S): at 08:24

## 2022-12-05 RX ADMIN — Medication 50 MILLIGRAM(S): at 06:31

## 2022-12-05 RX ADMIN — SERTRALINE 100 MILLIGRAM(S): 25 TABLET, FILM COATED ORAL at 08:25

## 2022-12-05 RX ADMIN — MEGESTROL ACETATE 400 MILLIGRAM(S): 40 SUSPENSION ORAL at 17:21

## 2022-12-05 RX ADMIN — SODIUM CHLORIDE 3 MILLILITER(S): 9 INJECTION INTRAMUSCULAR; INTRAVENOUS; SUBCUTANEOUS at 21:56

## 2022-12-05 RX ADMIN — APIXABAN 2.5 MILLIGRAM(S): 2.5 TABLET, FILM COATED ORAL at 06:31

## 2022-12-05 RX ADMIN — PANTOPRAZOLE SODIUM 40 MILLIGRAM(S): 20 TABLET, DELAYED RELEASE ORAL at 06:31

## 2022-12-05 RX ADMIN — APIXABAN 2.5 MILLIGRAM(S): 2.5 TABLET, FILM COATED ORAL at 17:21

## 2022-12-05 RX ADMIN — Medication 112 MICROGRAM(S): at 06:31

## 2022-12-05 RX ADMIN — ATORVASTATIN CALCIUM 80 MILLIGRAM(S): 80 TABLET, FILM COATED ORAL at 22:06

## 2022-12-05 NOTE — PROGRESS NOTE ADULT - SUBJECTIVE AND OBJECTIVE BOX
Reason for visit: CHAVO    Subjective: No acute overnight event. Patient denied any cardiac or urinary complains. No fever/chills.     ROS: All systems were reviewed in detail pertinent positive and negative mentioned above, rest are negative.    Physical Exam:  Gen: no acute distress  MS: alert, conversing normally  HENT: NCAT, MMM  CV: rhythm reg reg, rate normal, no m/g/r  Chest: CTAB, no w/r/r,  Abd: soft, NT, ND  Extremities: No edema    =======================================================  Vital Signs Last 24 Hrs  T(C): 36.8 (05 Dec 2022 08:00), Max: 36.8 (05 Dec 2022 08:00)  T(F): 98.3 (05 Dec 2022 08:00), Max: 98.3 (05 Dec 2022 08:00)  HR: 83 (05 Dec 2022 13:25) (76 - 84)  BP: 129/85 (05 Dec 2022 13:25) (112/56 - 129/85)  RR: 18 (05 Dec 2022 13:25) (18 - 18)  SpO2: 97% (05 Dec 2022 13:25) (96% - 98%)    Parameters below as of 05 Dec 2022 13:25  Patient On (Oxygen Delivery Method): room air      I&O's Summary    04 Dec 2022 07:01  -  05 Dec 2022 07:00  --------------------------------------------------------  IN: 840 mL / OUT: 1000 mL / NET: -160 mL    05 Dec 2022 07:01  -  05 Dec 2022 17:02  --------------------------------------------------------  IN: 480 mL / OUT: 0 mL / NET: 480 mL      =======================================================  Current Antibiotics:    Other medications:  apixaban 2.5 milliGRAM(s) Oral every 12 hours  ascorbic acid 500 milliGRAM(s) Oral daily  aspirin enteric coated 81 milliGRAM(s) Oral daily  atorvastatin 80 milliGRAM(s) Oral at bedtime  chlorhexidine 2% Cloths 1 Application(s) Topical <User Schedule>  ferrous    sulfate 325 milliGRAM(s) Oral daily  folic acid 1 milliGRAM(s) Oral daily  furosemide    Tablet 40 milliGRAM(s) Oral daily  lidocaine   4% Patch 1 Patch Transdermal daily  megestrol Suspension 400 milliGRAM(s) Oral daily  metoprolol tartrate 50 milliGRAM(s) Oral two times a day  pantoprazole    Tablet 40 milliGRAM(s) Oral before breakfast  polyethylene glycol 3350 17 Gram(s) Oral daily  senna 2 Tablet(s) Oral at bedtime  sertraline 100 milliGRAM(s) Oral daily  sodium chloride 0.9% lock flush 3 milliLiter(s) IV Push every 8 hours    =======================================================  12-05    139  |  101  |  43.8<H>  ----------------------------<  105<H>  4.1   |  28.0  |  1.07    Ca    8.6      05 Dec 2022 06:05  Mg     1.9     12-05    TPro  5.8<L>  /  Alb  2.9<L>  /  TBili  1.4  /  DBili  0.5<H>  /  AST  109<H>  /  ALT  114<H>  /  AlkPhos  361<H>  12-05    Creatinine, Serum: 1.07 mg/dL (12-05-22 @ 06:05)  Creatinine, Serum: 1.02 mg/dL (12-04-22 @ 06:08)  Creatinine, Serum: 1.35 mg/dL (12-03-22 @ 06:22)  Creatinine, Serum: 1.46 mg/dL (12-02-22 @ 06:56)  Creatinine, Serum: 1.59 mg/dL (12-01-22 @ 02:10)      =======================================================

## 2022-12-05 NOTE — PROGRESS NOTE ADULT - SUBJECTIVE AND OBJECTIVE BOX
Patient paranoid about going to rehabs after overhearing conversations with her neighbor.  Wants to consider going home.  Discussed to speak with those that would assist and make decision based on how she is doing.   Given level of function patient has limited insight.  Agree with CTS, plan should not be home.    REVIEW OF SYSTEMS  Constitutional - No fever,  +fatigue  Neurological - +loss of strength   Musculoskeletal - No joint pain, +joint swelling, No muscle pain    FUNCTIONAL PROGRESS  12/1 PT  Sit-Stand Transfer Training  Sit-to-Stand Transfer Training Rehab Potential: good, to achieve stated therapy goals  Transfer Training Sit-to-Stand Transfer: moderate assist (50% patient effort);  1 person assist;  verbal cues  Transfer Training Stand-to-Sit Transfer: moderate assist (50% patient effort);  1 person assist;  verbal cues  Sit-to-Stand Transfer Training Transfer Safety Analysis: decreased balance;  impaired balance;  decreased strength    Gait Training  Gait Training Rehab Potential: good, to achieve stated therapy goals  Gait Training: moderate assist (50% patient effort);  1 person assist;  verbal cues;  rolling walker;  5 feet  Gait Analysis: shuffling;  impaired balance;  decreased strength;  5 feet;  rolling walker      VITALS  T(C): 36.8 (12-05-22 @ 08:00), Max: 36.8 (12-05-22 @ 08:00)  HR: 76 (12-05-22 @ 08:00) (70 - 86)  BP: 117/71 (12-05-22 @ 08:00) (104/56 - 121/75)  RR: 18 (12-05-22 @ 08:00) (18 - 18)  SpO2: 98% (12-05-22 @ 08:00) (95% - 98%)  Wt(kg): --    MEDICATIONS   acetaminophen     Tablet .. 650 milliGRAM(s) every 6 hours PRN  apixaban 2.5 milliGRAM(s) every 12 hours  ascorbic acid 500 milliGRAM(s) daily  aspirin enteric coated 81 milliGRAM(s) daily  atorvastatin 80 milliGRAM(s) at bedtime  chlorhexidine 2% Cloths 1 Application(s) <User Schedule>  diphenhydrAMINE 25 milliGRAM(s) every 4 hours PRN  ferrous    sulfate 325 milliGRAM(s) daily  folic acid 1 milliGRAM(s) daily  furosemide    Tablet 40 milliGRAM(s) daily  levothyroxine 37.5 MICROGram(s) once  lidocaine   4% Patch 1 Patch daily  megestrol Suspension 400 milliGRAM(s) daily  metoprolol tartrate 50 milliGRAM(s) two times a day  pantoprazole    Tablet 40 milliGRAM(s) before breakfast  polyethylene glycol 3350 17 Gram(s) daily  senna 2 Tablet(s) at bedtime  sertraline 100 milliGRAM(s) daily  sodium chloride 0.9% lock flush 3 milliLiter(s) every 8 hours      RECENT LABS/IMAGING                          10.3   16.18 )-----------( 69       ( 05 Dec 2022 06:05 )             31.3     12-05    139  |  101  |  43.8<H>  ----------------------------<  105<H>  4.1   |  28.0  |  1.07    Ca    8.6      05 Dec 2022 06:05  Mg     1.9     12-05    TPro  5.8<L>  /  Alb  2.9<L>  /  TBili  1.4  /  DBili  0.5<H>  /  AST  109<H>  /  ALT  114<H>  /  AlkPhos  361<H>  12-05    PT/INR - ( 04 Dec 2022 06:08 )   PT: 13.2 sec;   INR: 1.14 ratio                     CXR 12/1 - Latest film shows catheter right chest tube inserted with diminished right effusion. Persistent mild left base findings.    CXR 12/4 -HEART:  Enlarged left atrial appendage clip. Prosthetic mitral valve. LUNGS: Small right effusion. There is opacity at the left base compatible with effusion/infiltrate. When taking into account difference in technique, there is no significant change.BONES: degenerative changes      ----------------------------------------------------------------------------------------  PHYSICAL EXAM  Constitutional - NAD, Comfortable  Extremities - No C/C/E, No calf tenderness   Neurologic Exam -                    Cognitive - AAOx4     Motor -                     LEFT    UE - ShAB 4/5, EF 4/5, EE 4/5,  5/5                    RIGHT UE - ShAB 4/5, EF 4/5, EE 4/5,  5/5                    LEFT    LE - HF 1/5, KE 3/5, DF 4/5, PF 4/5                    RIGHT LE - HF 1/5, KE 3/5, DF 4/5, PF 4/5         Sensory - Intact to LT  Psychiatric - Mood stable, Affect WNL  ----------------------------------------------------------------------------------------  ASSESSMENT/PLAN  82yFemale with functional deficits after having elective CABG x3, MVR and left atrial appendage clipping  Cardiopulmonary - ASA, Lasix, Lopressor   Pain - Tylenol, Oxycodone, Lidoderm  Mood - Zoloft  DVT PPX - SCDs, Eliquis  Rehab - Patient needs mod assist and has insight into deficits but not into needing rehab. Would benefit from AR. Pending patient decision. CTS aware.     Will continue to follow. Rehab recommendations are dependent on how functional progress changes as well as how patient continues to participate and tolerate therapeutic interventions, which may change. Recommend ongoing mobilization by staff to maintain cardiopulmonary function and prevention of secondary complications related to debility. Discussed with rehab team.

## 2022-12-05 NOTE — PROGRESS NOTE ADULT - SUBJECTIVE AND OBJECTIVE BOX
SUBJECTIVE   "i dont think i am going to rehab anymore"   without acute complaints   overall well feeling   would like to go home with extensive nursing care     INTERIM HISTORY SIGNIFICANT FOR   was pending bed in acute rehab   since has changed her mind because she thinks she is going to get covid     Patient is a 82y old  Female who presents with a chief complaint of NSTEMI, CAD with chest pain (04 Dec 2022 05:08)    HPI:  83 yo female, PMHx of LBBB, HTN, HLD, mitral valve prolapse, hypothyroidism, breast CA 10 years ago s/p chemotherapy and left mastectomy, admitted to [facility] 11/22 after experiencing acute onset chest pain with radiation to back that onset the night prior. Ruled in for NSTEMI with positive troponin 430 --> 446. She underwent diagnostic cardiac catheterization with Dr. Garibay, which revealed triple vessel CAD, with severe LAD, LCx, and occlusion of RCA. Ventriculogram with EF 30% with concerns for severe MR. She was given ASA, Plavix load 600mg, and placed on Heparin gtt for ACS. An IABP and SGC were placed and she was transferred to [facility] MICU for further evaluation and medical management. Upon arrival to MICU, hemodynamics stable, ADBP >170 on 1:1. Patient was on 100% NRB weaned to 3lpm nasal cannula laying supine comfortably. Denies chest pain, dyspnea, palpitations, orthopnea, LE edema.  (23 Nov 2022 21:40)    OBJECTIVE  PAST MEDICAL & SURGICAL HISTORY:  Hypertension      Hyperlipidemia      Hypothyroid      Breast cancer      History of mitral valve prolapse      S/P mastectomy, left        amiodarone (Hives)  iodinated radiocontrast agents (Vomiting; Headache)    Home Medications:  Lipitor 20 mg oral tablet: 1 tab(s) orally once a day (28 Nov 2022 07:41)  Protonix 40 mg oral delayed release tablet: 1 tab(s) orally once a day (28 Nov 2022 07:41)  Synthroid 112 mcg (0.112 mg) oral tablet: 1 tab(s) orally once a day (28 Nov 2022 07:41)  Toprol-XL 50 mg oral tablet, extended release: 1 tab(s) orally once a day (28 Nov 2022 07:41)  Zoloft 100 mg oral tablet: 1 tab(s) orally once a day (28 Nov 2022 07:41)    VITALS  Currently in sinus rhythm with vitals as below  ICU Vital Signs Last 24 Hrs  T(C): 36.6 (05 Dec 2022 01:30), Max: 36.6 (04 Dec 2022 08:53)  T(F): 97.8 (05 Dec 2022 01:30), Max: 97.9 (04 Dec 2022 08:53)  HR: 80 (05 Dec 2022 01:30) (70 - 86)  BP: 116/56 (05 Dec 2022 01:30) (104/56 - 129/73)  BP(mean): --  ABP: --  ABP(mean): --  RR: 18 (05 Dec 2022 01:30) (18 - 18)  SpO2: 96% (05 Dec 2022 01:30) (95% - 98%)    O2 Parameters below as of 05 Dec 2022 01:30  Patient On (Oxygen Delivery Method): room air          Adult Advanced Hemodynamics Last 24 Hrs  CVP(mm Hg): --  CVP(cm H2O): --  CO: --  CI: --  PA: --  PA(mean): --  PCWP: --  SVR: --  SVRI: --  PVR: --  PVRI: --  LABS                        9.7    16.03 )-----------( 67       ( 04 Dec 2022 06:08 )             29.0   PT/INR - ( 04 Dec 2022 06:08 )   PT: 13.2 sec;   INR: 1.14 ratio         12-04    136  |  101  |  44.8<H>  ----------------------------<  110<H>  4.3   |  24.0  |  1.02    Ca    8.2<L>      04 Dec 2022 06:08  Mg     2.0     12-04    CAPILLARY BLOOD GLUCOSE              IN/OUT    12-03-22 @ 07:01  -  12-04-22 @ 07:00  --------------------------------------------------------  IN: 1160 mL / OUT: 1150 mL / NET: 10 mL    12-04-22 @ 07:01  -  12-05-22 @ 03:37  --------------------------------------------------------  IN: 840 mL / OUT: 1000 mL / NET: -160 mL      IMAGING  personally reviewed imaging   Xray Chest 1 View- PORTABLE-Routine:   ACC: [id number] EXAM:  XR CHEST PORTABLE ROUTINE 1V                          PROCEDURE DATE:  12/04/2022          INTERPRETATION:  HISTORY: Admitting Dxs: R07.9 CHEST PAIN, UNSPECIFIED;    s.p ohs;  TECHNIQUE: Portable frontal view of the chest, 1 view.  COMPARISON: none.  FINDINGS/  IMPRESSION:    HEART:  Enlarged left atrial appendage clip. Prosthetic mitral valve.  LUNGS: Small right effusion. There is opacity at the left base compatible   with effusion/infiltrate. When taking into account difference in   technique, there is no significant change.  BONES: degenerative changes      --- End of Report ---            RASHEED AQUINO MD; Attending Interventional Radiologist  This document has been electronically signed. Dec  4 2022 10:13AM (12-04-22 @ 05:02)    CURRENT MEDICATIONS  MEDICATIONS  (STANDING):  apixaban 2.5 milliGRAM(s) Oral every 12 hours  ascorbic acid 500 milliGRAM(s) Oral daily  aspirin enteric coated 81 milliGRAM(s) Oral daily  atorvastatin 80 milliGRAM(s) Oral at bedtime  chlorhexidine 2% Cloths 1 Application(s) Topical <User Schedule>  ferrous    sulfate 325 milliGRAM(s) Oral daily  folic acid 1 milliGRAM(s) Oral daily  furosemide    Tablet 40 milliGRAM(s) Oral daily  levothyroxine 112 MICROGram(s) Oral daily  lidocaine   4% Patch 1 Patch Transdermal daily  megestrol Suspension 400 milliGRAM(s) Oral daily  metoprolol tartrate 50 milliGRAM(s) Oral two times a day  pantoprazole    Tablet 40 milliGRAM(s) Oral before breakfast  polyethylene glycol 3350 17 Gram(s) Oral daily  senna 2 Tablet(s) Oral at bedtime  sertraline 100 milliGRAM(s) Oral daily  sodium chloride 0.9% lock flush 3 milliLiter(s) IV Push every 8 hours    MEDICATIONS  (PRN):  acetaminophen     Tablet .. 650 milliGRAM(s) Oral every 6 hours PRN Temp greater or equal to 38C (100.4F), Mild Pain (1 - 3)  diphenhydrAMINE 25 milliGRAM(s) Oral every 4 hours PRN Rash and/or Itching

## 2022-12-05 NOTE — PROGRESS NOTE ADULT - RESPIRATORY
diminished breath sounds, L/diminished breath sounds, R
clear to auscultation bilaterally/no wheezes/no rales

## 2022-12-06 ENCOUNTER — TRANSCRIPTION ENCOUNTER (OUTPATIENT)
Age: 82
End: 2022-12-06

## 2022-12-06 VITALS
OXYGEN SATURATION: 96 % | TEMPERATURE: 98 F | RESPIRATION RATE: 18 BRPM | SYSTOLIC BLOOD PRESSURE: 105 MMHG | HEART RATE: 96 BPM | DIASTOLIC BLOOD PRESSURE: 58 MMHG

## 2022-12-06 LAB
ALBUMIN SERPL ELPH-MCNC: 2.4 G/DL — LOW (ref 3.3–5.2)
ALP SERPL-CCNC: 296 U/L — HIGH (ref 40–120)
ALT FLD-CCNC: 101 U/L — HIGH
ANION GAP SERPL CALC-SCNC: 9 MMOL/L — SIGNIFICANT CHANGE UP (ref 5–17)
AST SERPL-CCNC: 82 U/L — HIGH
BILIRUB SERPL-MCNC: 1.3 MG/DL — SIGNIFICANT CHANGE UP (ref 0.4–2)
BUN SERPL-MCNC: 41.6 MG/DL — HIGH (ref 8–20)
CALCIUM SERPL-MCNC: 8.1 MG/DL — LOW (ref 8.4–10.5)
CHLORIDE SERPL-SCNC: 101 MMOL/L — SIGNIFICANT CHANGE UP (ref 96–108)
CO2 SERPL-SCNC: 29 MMOL/L — SIGNIFICANT CHANGE UP (ref 22–29)
CREAT SERPL-MCNC: 1.04 MG/DL — SIGNIFICANT CHANGE UP (ref 0.5–1.3)
EGFR: 54 ML/MIN/1.73M2 — LOW
GLUCOSE SERPL-MCNC: 100 MG/DL — HIGH (ref 70–99)
HCT VFR BLD CALC: 28.2 % — LOW (ref 34.5–45)
HGB BLD-MCNC: 9.1 G/DL — LOW (ref 11.5–15.5)
MAGNESIUM SERPL-MCNC: 1.7 MG/DL — SIGNIFICANT CHANGE UP (ref 1.6–2.6)
MCHC RBC-ENTMCNC: 29.8 PG — SIGNIFICANT CHANGE UP (ref 27–34)
MCHC RBC-ENTMCNC: 32.3 GM/DL — SIGNIFICANT CHANGE UP (ref 32–36)
MCV RBC AUTO: 92.5 FL — SIGNIFICANT CHANGE UP (ref 80–100)
PLATELET # BLD AUTO: 65 K/UL — LOW (ref 150–400)
POTASSIUM SERPL-MCNC: 3.6 MMOL/L — SIGNIFICANT CHANGE UP (ref 3.5–5.3)
POTASSIUM SERPL-SCNC: 3.6 MMOL/L — SIGNIFICANT CHANGE UP (ref 3.5–5.3)
PROT SERPL-MCNC: 5 G/DL — LOW (ref 6.6–8.7)
RBC # BLD: 3.05 M/UL — LOW (ref 3.8–5.2)
RBC # FLD: 17.5 % — HIGH (ref 10.3–14.5)
SODIUM SERPL-SCNC: 139 MMOL/L — SIGNIFICANT CHANGE UP (ref 135–145)
WBC # BLD: 15.12 K/UL — HIGH (ref 3.8–10.5)
WBC # FLD AUTO: 15.12 K/UL — HIGH (ref 3.8–10.5)

## 2022-12-06 PROCEDURE — 93325 DOPPLER ECHO COLOR FLOW MAPG: CPT

## 2022-12-06 PROCEDURE — U0005: CPT

## 2022-12-06 PROCEDURE — P9037: CPT

## 2022-12-06 PROCEDURE — 83735 ASSAY OF MAGNESIUM: CPT

## 2022-12-06 PROCEDURE — 82947 ASSAY GLUCOSE BLOOD QUANT: CPT

## 2022-12-06 PROCEDURE — C1887: CPT

## 2022-12-06 PROCEDURE — 80048 BASIC METABOLIC PNL TOTAL CA: CPT

## 2022-12-06 PROCEDURE — 83036 HEMOGLOBIN GLYCOSYLATED A1C: CPT

## 2022-12-06 PROCEDURE — 86901 BLOOD TYPING SEROLOGIC RH(D): CPT

## 2022-12-06 PROCEDURE — 86923 COMPATIBILITY TEST ELECTRIC: CPT

## 2022-12-06 PROCEDURE — 99233 SBSQ HOSP IP/OBS HIGH 50: CPT

## 2022-12-06 PROCEDURE — 82043 UR ALBUMIN QUANTITATIVE: CPT

## 2022-12-06 PROCEDURE — 93005 ELECTROCARDIOGRAM TRACING: CPT

## 2022-12-06 PROCEDURE — 84436 ASSAY OF TOTAL THYROXINE: CPT

## 2022-12-06 PROCEDURE — 93454 CORONARY ARTERY ANGIO S&I: CPT | Mod: 78

## 2022-12-06 PROCEDURE — 82150 ASSAY OF AMYLASE: CPT

## 2022-12-06 PROCEDURE — 82140 ASSAY OF AMMONIA: CPT

## 2022-12-06 PROCEDURE — 80053 COMPREHEN METABOLIC PANEL: CPT

## 2022-12-06 PROCEDURE — C1769: CPT

## 2022-12-06 PROCEDURE — 82570 ASSAY OF URINE CREATININE: CPT

## 2022-12-06 PROCEDURE — 83880 ASSAY OF NATRIURETIC PEPTIDE: CPT

## 2022-12-06 PROCEDURE — 71045 X-RAY EXAM CHEST 1 VIEW: CPT | Mod: 26

## 2022-12-06 PROCEDURE — P9012: CPT

## 2022-12-06 PROCEDURE — C1894: CPT

## 2022-12-06 PROCEDURE — P9045: CPT

## 2022-12-06 PROCEDURE — 87640 STAPH A DNA AMP PROBE: CPT

## 2022-12-06 PROCEDURE — 93312 ECHO TRANSESOPHAGEAL: CPT

## 2022-12-06 PROCEDURE — 97110 THERAPEUTIC EXERCISES: CPT

## 2022-12-06 PROCEDURE — 86900 BLOOD TYPING SEROLOGIC ABO: CPT

## 2022-12-06 PROCEDURE — P9100: CPT

## 2022-12-06 PROCEDURE — C1889: CPT

## 2022-12-06 PROCEDURE — 33967 INSERT I-AORT PERCUT DEVICE: CPT

## 2022-12-06 PROCEDURE — 84132 ASSAY OF SERUM POTASSIUM: CPT

## 2022-12-06 PROCEDURE — 82803 BLOOD GASES ANY COMBINATION: CPT

## 2022-12-06 PROCEDURE — 93308 TTE F-UP OR LMTD: CPT

## 2022-12-06 PROCEDURE — 31720 CLEARANCE OF AIRWAYS: CPT

## 2022-12-06 PROCEDURE — 86965 POOLING BLOOD PLATELETS: CPT

## 2022-12-06 PROCEDURE — C1751: CPT

## 2022-12-06 PROCEDURE — 93880 EXTRACRANIAL BILAT STUDY: CPT

## 2022-12-06 PROCEDURE — 85730 THROMBOPLASTIN TIME PARTIAL: CPT

## 2022-12-06 PROCEDURE — L8699: CPT

## 2022-12-06 PROCEDURE — 84480 ASSAY TRIIODOTHYRONINE (T3): CPT

## 2022-12-06 PROCEDURE — 97116 GAIT TRAINING THERAPY: CPT

## 2022-12-06 PROCEDURE — 85014 HEMATOCRIT: CPT

## 2022-12-06 PROCEDURE — 82435 ASSAY OF BLOOD CHLORIDE: CPT

## 2022-12-06 PROCEDURE — 84443 ASSAY THYROID STIM HORMONE: CPT

## 2022-12-06 PROCEDURE — 82550 ASSAY OF CK (CPK): CPT

## 2022-12-06 PROCEDURE — 81001 URINALYSIS AUTO W/SCOPE: CPT

## 2022-12-06 PROCEDURE — 36415 COLL VENOUS BLD VENIPUNCTURE: CPT

## 2022-12-06 PROCEDURE — 71045 X-RAY EXAM CHEST 1 VIEW: CPT

## 2022-12-06 PROCEDURE — 80061 LIPID PANEL: CPT

## 2022-12-06 PROCEDURE — 85027 COMPLETE CBC AUTOMATED: CPT

## 2022-12-06 PROCEDURE — 94760 N-INVAS EAR/PLS OXIMETRY 1: CPT

## 2022-12-06 PROCEDURE — 94002 VENT MGMT INPAT INIT DAY: CPT

## 2022-12-06 PROCEDURE — 86850 RBC ANTIBODY SCREEN: CPT

## 2022-12-06 PROCEDURE — 82553 CREATINE MB FRACTION: CPT

## 2022-12-06 PROCEDURE — P9016: CPT

## 2022-12-06 PROCEDURE — 87641 MR-STAPH DNA AMP PROBE: CPT

## 2022-12-06 PROCEDURE — 85610 PROTHROMBIN TIME: CPT

## 2022-12-06 PROCEDURE — 83690 ASSAY OF LIPASE: CPT

## 2022-12-06 PROCEDURE — 36430 TRANSFUSION BLD/BLD COMPNT: CPT

## 2022-12-06 PROCEDURE — 85025 COMPLETE CBC W/AUTO DIFF WBC: CPT

## 2022-12-06 PROCEDURE — 85576 BLOOD PLATELET AGGREGATION: CPT

## 2022-12-06 PROCEDURE — 84484 ASSAY OF TROPONIN QUANT: CPT

## 2022-12-06 PROCEDURE — 99024 POSTOP FOLLOW-UP VISIT: CPT

## 2022-12-06 PROCEDURE — 84156 ASSAY OF PROTEIN URINE: CPT

## 2022-12-06 PROCEDURE — 85018 HEMOGLOBIN: CPT

## 2022-12-06 PROCEDURE — 84134 ASSAY OF PREALBUMIN: CPT

## 2022-12-06 PROCEDURE — C8929: CPT

## 2022-12-06 PROCEDURE — 94003 VENT MGMT INPAT SUBQ DAY: CPT

## 2022-12-06 PROCEDURE — 93320 DOPPLER ECHO COMPLETE: CPT

## 2022-12-06 PROCEDURE — U0003: CPT

## 2022-12-06 PROCEDURE — 80076 HEPATIC FUNCTION PANEL: CPT

## 2022-12-06 PROCEDURE — 82330 ASSAY OF CALCIUM: CPT

## 2022-12-06 PROCEDURE — 84100 ASSAY OF PHOSPHORUS: CPT

## 2022-12-06 PROCEDURE — 84295 ASSAY OF SERUM SODIUM: CPT

## 2022-12-06 PROCEDURE — 82962 GLUCOSE BLOOD TEST: CPT

## 2022-12-06 PROCEDURE — 94010 BREATHING CAPACITY TEST: CPT

## 2022-12-06 PROCEDURE — 83605 ASSAY OF LACTIC ACID: CPT

## 2022-12-06 RX ORDER — ASCORBIC ACID 60 MG
1 TABLET,CHEWABLE ORAL
Qty: 0 | Refills: 0 | DISCHARGE
Start: 2022-12-06

## 2022-12-06 RX ORDER — POLYETHYLENE GLYCOL 3350 17 G/17G
17 POWDER, FOR SOLUTION ORAL
Qty: 0 | Refills: 0 | DISCHARGE
Start: 2022-12-06

## 2022-12-06 RX ORDER — ACETAMINOPHEN 500 MG
2 TABLET ORAL
Qty: 0 | Refills: 0 | DISCHARGE
Start: 2022-12-06

## 2022-12-06 RX ORDER — FUROSEMIDE 40 MG
1 TABLET ORAL
Qty: 0 | Refills: 0 | DISCHARGE
Start: 2022-12-06 | End: 2022-12-12

## 2022-12-06 RX ORDER — MAGNESIUM SULFATE 500 MG/ML
2 VIAL (ML) INJECTION
Refills: 0 | Status: COMPLETED | OUTPATIENT
Start: 2022-12-06 | End: 2022-12-06

## 2022-12-06 RX ORDER — APIXABAN 2.5 MG/1
1 TABLET, FILM COATED ORAL
Qty: 0 | Refills: 0 | DISCHARGE
Start: 2022-12-06

## 2022-12-06 RX ORDER — ASPIRIN/CALCIUM CARB/MAGNESIUM 324 MG
1 TABLET ORAL
Qty: 0 | Refills: 0 | DISCHARGE
Start: 2022-12-06

## 2022-12-06 RX ORDER — FERROUS SULFATE 325(65) MG
1 TABLET ORAL
Qty: 0 | Refills: 0 | DISCHARGE
Start: 2022-12-06

## 2022-12-06 RX ORDER — POTASSIUM CHLORIDE 20 MEQ
40 PACKET (EA) ORAL ONCE
Refills: 0 | Status: COMPLETED | OUTPATIENT
Start: 2022-12-06 | End: 2022-12-06

## 2022-12-06 RX ORDER — DIPHENHYDRAMINE HCL 50 MG
1 CAPSULE ORAL
Qty: 0 | Refills: 0 | DISCHARGE
Start: 2022-12-06

## 2022-12-06 RX ORDER — MEGESTROL ACETATE 40 MG/ML
10 SUSPENSION ORAL
Qty: 0 | Refills: 0 | DISCHARGE
Start: 2022-12-06

## 2022-12-06 RX ORDER — POTASSIUM CHLORIDE 20 MEQ
2 PACKET (EA) ORAL
Qty: 0 | Refills: 0 | DISCHARGE
Start: 2022-12-06 | End: 2022-12-12

## 2022-12-06 RX ORDER — SENNA PLUS 8.6 MG/1
2 TABLET ORAL
Qty: 0 | Refills: 0 | DISCHARGE
Start: 2022-12-06

## 2022-12-06 RX ADMIN — SERTRALINE 100 MILLIGRAM(S): 25 TABLET, FILM COATED ORAL at 09:43

## 2022-12-06 RX ADMIN — Medication 81 MILLIGRAM(S): at 09:43

## 2022-12-06 RX ADMIN — Medication 500 MILLIGRAM(S): at 09:43

## 2022-12-06 RX ADMIN — Medication 650 MILLIGRAM(S): at 05:22

## 2022-12-06 RX ADMIN — Medication 50 MILLIGRAM(S): at 05:17

## 2022-12-06 RX ADMIN — Medication 40 MILLIGRAM(S): at 05:17

## 2022-12-06 RX ADMIN — MEGESTROL ACETATE 400 MILLIGRAM(S): 40 SUSPENSION ORAL at 09:43

## 2022-12-06 RX ADMIN — SODIUM CHLORIDE 3 MILLILITER(S): 9 INJECTION INTRAMUSCULAR; INTRAVENOUS; SUBCUTANEOUS at 13:52

## 2022-12-06 RX ADMIN — Medication 25 GRAM(S): at 11:09

## 2022-12-06 RX ADMIN — Medication 150 MICROGRAM(S): at 05:16

## 2022-12-06 RX ADMIN — CHLORHEXIDINE GLUCONATE 1 APPLICATION(S): 213 SOLUTION TOPICAL at 05:27

## 2022-12-06 RX ADMIN — Medication 40 MILLIEQUIVALENT(S): at 16:16

## 2022-12-06 RX ADMIN — Medication 325 MILLIGRAM(S): at 09:43

## 2022-12-06 RX ADMIN — Medication 25 GRAM(S): at 13:06

## 2022-12-06 RX ADMIN — APIXABAN 2.5 MILLIGRAM(S): 2.5 TABLET, FILM COATED ORAL at 05:17

## 2022-12-06 RX ADMIN — Medication 1 MILLIGRAM(S): at 09:43

## 2022-12-06 NOTE — DISCHARGE NOTE PROVIDER - NSDCMRMEDTOKEN_GEN_ALL_CORE_FT
acetaminophen 325 mg oral tablet: 2 tab(s) orally every 6 hours, As needed, Temp greater or equal to 38C (100.4F), Mild Pain (1 - 3)  apixaban 2.5 mg oral tablet: 1 tab(s) orally every 12 hours  ascorbic acid 500 mg oral tablet: 1 tab(s) orally once a day  aspirin 81 mg oral delayed release tablet: 1 tab(s) orally once a day  diphenhydrAMINE 25 mg oral capsule: 1 cap(s) orally every 4 hours, As needed, Rash and/or Itching  ferrous sulfate 325 mg (65 mg elemental iron) oral tablet: 1 tab(s) orally once a day  furosemide 40 mg oral tablet: 1 tab(s) orally once a day  Lipitor 20 mg oral tablet: 1 tab(s) orally once a day  megestrol 40 mg/mL oral suspension: 10 milliliter(s) orally once a day  polyethylene glycol 3350 oral powder for reconstitution: 17 gram(s) orally once a day  potassium chloride 20 mEq oral tablet, extended release: 2 tab(s) orally once  Protonix 40 mg oral delayed release tablet: 1 tab(s) orally once a day  senna leaf extract oral tablet: 2 tab(s) orally once a day (at bedtime)  Synthroid 112 mcg (0.112 mg) oral tablet: 1 tab(s) orally once a day  Toprol-XL 50 mg oral tablet, extended release: 1 tab(s) orally once a day  Zoloft 100 mg oral tablet: 1 tab(s) orally once a day

## 2022-12-06 NOTE — PROGRESS NOTE ADULT - PROBLEM SELECTOR PLAN 5
Continue statin  patient education   DASH TLC
Pt r/i for nstemi after iabp removal  trend CE  continue IABP 1:1 monitor L groin for vascular compromise   preop for ohs  start heparin gtt now dose 300 cc per dr beckett, check ptt   will d/w attending on Am rounds.
Continue statin  patient education   DASH TLC
Continue statin  patient education   DASH TLC
Continue statin.  DASH/ TLC diet.
w/ IABp in plce   s.p surgical revascularization
Continue statin.  DASH/ TLC diet.
Continue statin.  DASH/ TLC diet.

## 2022-12-06 NOTE — PROGRESS NOTE ADULT - PROBLEM SELECTOR PROBLEM 6
Prophylactic measure
CHAVO (acute kidney injury)
Prophylactic measure

## 2022-12-06 NOTE — PROGRESS NOTE ADULT - PROBLEM SELECTOR PROBLEM 1
Triple vessel coronary artery disease

## 2022-12-06 NOTE — DISCHARGE NOTE PROVIDER - NSDCCPCAREPLAN_GEN_ALL_CORE_FT
PRINCIPAL DISCHARGE DIAGNOSIS  Diagnosis: Triple vessel coronary artery disease  Assessment and Plan of Treatment: 1. Take ALL of your medications as ordered. Fill your prescriptions the day you are discharged and take according to the schedule you were given. Continue to take a stool softener if you are taking narcotic pain medications. AVOID medications such as ibuprofen or naproxen if you have had bypass surgery. If you have any questions or are unable to fill the prescriptions, please call the office right away at [phone].  2. Shower daily. Clean all incisions daily while showering with warm water and mild soap, pat dry with a clean towel and do not cover with any dressings unless instructed to. No bathing, swimming in a pool or the ocean until instructed by MD.  DO NOT use creams or lotions on the wound.  3. We advise that you do not drive until instructed by MD.   4. You may not return to work until instructed by MD.   5. Please eat a low fat, low cholesterol, low salt diet. (No added/extra salt)  6. Weigh yourself every day in the morning and record it in the weight log in your red folder. Notify the office of any weight gain more than 2-3 pounds in 24 hours.  **Please LIMIT your fluid intake to less than a liter a day.**  7. Continue breathing exercises several times a day. Continue to use your heart pillow.  8. No heavy lifting nothing greater than 5 pounds until cleared by MD.   9. Call / Notify MD any fever greater than 101.0, any drainage from incisions or if they become red, hot or very tender to the touch.  10. Increase activity as tolerated. Walk indoors and/or outdoors at least 3 times a day.      SECONDARY DISCHARGE DIAGNOSES  Diagnosis: Severe mitral regurgitation by prior echocardiogram  Assessment and Plan of Treatment:

## 2022-12-06 NOTE — DISCHARGE NOTE PROVIDER - NSDCFUADDAPPT_GEN_ALL_CORE_FT
Please follow up with Dr. Ackerman for post op appt set for December 21st at 9:30am. Please arrive 15 minutes early.     Your Care Navigator Nurse Practitioner will be in touch to see you in your home within a few days from discharge. The Follow Your Heart program can help ensure you understand your medications, discharge instructions and answer any questions you may have at that time. They are also a great source to address concerns during the day and may be reached at [phone].     Please call the Cardiothoracic Surgery office at [phone] if you are experiencing any shortness of breath, chest pain, fevers or chills, drainage from the incisions, persistent nausea, vomiting or if you have any questions about your medications. If the symptoms are severe, call [phone] and go to the nearest hospital. You can also call [phone] for an emergency [facility] ambulance, which will take you to the closest [facility].    If you need any assistance for making any appointments for a new consult or referral in any specialty, please call our [facility] Clinical Coordination Center at [phone].

## 2022-12-06 NOTE — PROGRESS NOTE ADULT - PROVIDER SPECIALTY LIST ADULT
CT Surgery
Cardiology
Critical Care
Rehab Medicine
Rehab Medicine
Critical Care
Intervent Cardiology
Nephrology
Rehab Medicine
Critical Care
Critical Care
Intervent Cardiology
Intervent Cardiology
MICU
Nephrology
CT Surgery
Nephrology
CT Surgery
MICU
CT Surgery
Cardiology
CT Surgery

## 2022-12-06 NOTE — PROGRESS NOTE ADULT - PROBLEM SELECTOR PLAN 2
Chief Complaint   Patient presents with   • Atrial Flutter     F/V DX:ATRIAL FLUTTER       Subjective:   Abhilash Rios is a 88 y.o. male who presents today with SSS and PPM. Doing well. No chest pain or SOB. Minimal atrial arrhythmias. On ASA per patients request. Overall stable.    Past Medical History:   Diagnosis Date   • Atrial flutter (HCC) 3/8/2012   • Dizziness 3/8/2012   • Macular degeneration 3/8/2012   • Pre-syncope 3/8/2012   • Presence of permanent cardiac pacemaker 3/8/2012   • S/P AV amy ablation 3/8/2012   • SSS (sick sinus syndrome) (HCC) 3/8/2012   • TIA (transient ischemic attack) 3/8/2012   • Vertigo 3/8/2012     Past Surgical History:   Procedure Laterality Date   • EYE SURGERY      09/02/09 Status post eye surgery for macular degeneration.   • OTHER ORTHOPEDIC SURGERY      Lower Back Surgery   • ROTATOR CUFF REPAIR      09/02/09 Bilateral.   • TRANS URETHRAL RESECTION PROSTATE       Family History   Problem Relation Age of Onset   • Non-contributory Other      Social History     Social History   • Marital status: Single     Spouse name: N/A   • Number of children: N/A   • Years of education: N/A     Occupational History   • Not on file.     Social History Main Topics   • Smoking status: Never Smoker   • Smokeless tobacco: Never Used   • Alcohol use Not on file   • Drug use: Unknown   • Sexual activity: Not on file     Other Topics Concern   • Not on file     Social History Narrative   • No narrative on file     Allergies   Allergen Reactions   • Sulfa Drugs Anaphylaxis     Outpatient Encounter Prescriptions as of 5/15/2018   Medication Sig Dispense Refill   • aspirin (ASA) 325 MG Tab Take 325 mg by mouth every 6 hours as needed.     • Cholecalciferol (VITAMIN D) 1000 UNIT CAPS Take 1 Cap by mouth every day.     • azithromycin (ZITHROMAX) 250 MG Tab z pack take as directed (Patient not taking: Reported on 10/23/2017) 5 Tab 0   • aspirin EC (ECOTRIN) 81 MG Tablet Delayed Response Take 81 mg 
"by mouth every day.       No facility-administered encounter medications on file as of 5/15/2018.      ROS     Objective:   /82   Pulse 87   Ht 1.702 m (5' 7.01\")   Wt 73 kg (161 lb)   SpO2 100%   BMI 25.21 kg/m²     Physical Exam   Constitutional: He is oriented to person, place, and time. He appears well-developed and well-nourished.   HENT:   Head: Normocephalic and atraumatic.   Eyes: EOM are normal.   Neck: Normal range of motion. Neck supple.   Cardiovascular: Normal rate, regular rhythm and intact distal pulses.  Exam reveals no gallop and no friction rub.    No murmur heard.  Pulmonary/Chest: Effort normal and breath sounds normal.   Abdominal: Soft.   Musculoskeletal: Normal range of motion. He exhibits no edema.   Neurological: He is alert and oriented to person, place, and time.   Skin: Skin is warm and dry.   Psychiatric: He has a normal mood and affect. His behavior is normal. Judgment and thought content normal.       Assessment:     1. Presence of permanent cardiac pacemaker     2. Typical atrial flutter (HCC)     3. S/P ablation of atrial flutter     4. Vertigo     5. SSS (sick sinus syndrome) ([facility])         Medical Decision Making:  Today's Assessment / Status / Plan:   1. SSS nl PPM function.  2. PAF minimal.  3. Vertigo unchanged.  4. F/U device check in 6 months and me in 1 year.  "
-Pleural effusions R> left . Thought to be empyema . Awaiting CT surgery for chest tube . On antibiotics .   -CHF suspect secondary to diastolic dysfunction.  -Pulmonary HTN -thought to be out of proportion to her HF . Has component on COPD . Agree with pulmonary thought to be multifactorial.   -AS S/P TAVR with adequate function on recent echo  -AF permanent Not on A/C secondary to bleeding risk   -CAD stable No angina or increase in cardiac biomarkers   -DM  -Kidney lesion Needs work up eventually , work up as per PCP     Plan:  Antibiotics as per pulmonary   Consider ID consult  CT surgery for chest tube - see their consult.   pulmonary follow up   Maintain current Lasix dose
S/p surgical revascularization as above.
Only moderate per AGUS  full report is pending
S/p surgical revascularization as above.
S/p surgical revascularization as above.
.  - s/p MVR
s/p AGUS 11/25 showing degenerative mitral valve with calcificaiton, moderate mitral regurgitation
s/p surgical revascularization
Continued work up for possible open heart surgery
s/p surgical revascularization
Review TTE   strict monitoring I/o avoid volume overload
s/p surgical revascularization
S/p surgical revascularization as above.
as above   s.p MVR / CABG   diuresis when able
S/p surgical revascularization as above.

## 2022-12-06 NOTE — PROGRESS NOTE ADULT - NUTRITIONAL ASSESSMENT
This patient has been assessed with a concern for Malnutrition and has been determined to have a diagnosis/diagnoses of Moderate protein-calorie malnutrition.    This patient is being managed with:   Diet Clear Liquid-  Entered: Nov 28 2022  7:15AM    
This patient has been assessed with a concern for Malnutrition and has been determined to have a diagnosis/diagnoses of Moderate protein-calorie malnutrition.    This patient is being managed with:   Diet Regular-  Consistent Carbohydrate {Evening Snack} (CSTCHOSN)  DASH/TLC {Sodium & Cholesterol Restricted} (DASH)  Entered: Nov 28 2022  3:20PM    
This patient has been assessed with a concern for Malnutrition and has been determined to have a diagnosis/diagnoses of Moderate protein-calorie malnutrition.    This patient is being managed with:   Diet DASH/TLC-  Sodium & Cholesterol Restricted  Vegan {Accepts Vegetable Products Only}  Entered: Nov 25 2022 12:51PM    
This patient has been assessed with a concern for Malnutrition and has been determined to have a diagnosis/diagnoses of Moderate protein-calorie malnutrition.    This patient is being managed with:   Diet NPO after Midnight-     NPO Start Date: 26-Nov-2022   NPO Start Time: 23:59  Except Medications     Special Instructions for Nursing:  Except Medications  Entered: Nov 26 2022  8:40AM    Diet DASH/TLC-  Sodium & Cholesterol Restricted  Vegan {Accepts Vegetable Products Only}  Entered: Nov 25 2022 12:51PM    
This patient has been assessed with a concern for Malnutrition and has been determined to have a diagnosis/diagnoses of Moderate protein-calorie malnutrition.    This patient is being managed with:   Diet DASH/TLC-  Sodium & Cholesterol Restricted  Vegan {Accepts Vegetable Products Only}  Entered: Nov 25 2022 12:51PM    
This patient has been assessed with a concern for Malnutrition and has been determined to have a diagnosis/diagnoses of Moderate protein-calorie malnutrition.    This patient is being managed with:   Diet Regular-  Consistent Carbohydrate {Evening Snack} (CSTCHOSN)  DASH/TLC {Sodium & Cholesterol Restricted} (DASH)  Entered: Nov 28 2022  3:20PM    
This patient has been assessed with a concern for Malnutrition and has been determined to have a diagnosis/diagnoses of Moderate protein-calorie malnutrition.      
This patient has been assessed with a concern for Malnutrition and has been determined to have a diagnosis/diagnoses of Moderate protein-calorie malnutrition.    This patient is being managed with:   Diet Regular-  Consistent Carbohydrate {Evening Snack} (CSTCHOSN)  DASH/TLC {Sodium & Cholesterol Restricted} (DASH)  Entered: Nov 28 2022  3:20PM    
This patient has been assessed with a concern for Malnutrition and has been determined to have a diagnosis/diagnoses of Moderate protein-calorie malnutrition.    This patient is being managed with:   Diet DASH/TLC-  Sodium & Cholesterol Restricted  Supplement Feeding Modality:  Oral  Ensure Enlive Cans or Servings Per Day:  3       Frequency:  Three Times a day  Entered: Dec  3 2022 11:45AM    
This patient has been assessed with a concern for Malnutrition and has been determined to have a diagnosis/diagnoses of Moderate protein-calorie malnutrition.    This patient is being managed with:   Diet DASH/TLC-  Sodium & Cholesterol Restricted  Supplement Feeding Modality:  Oral  Ensure Enlive Cans or Servings Per Day:  3       Frequency:  Three Times a day  Entered: Dec  3 2022 11:45AM

## 2022-12-06 NOTE — DISCHARGE NOTE PROVIDER - NSDCCPTREATMENT_GEN_ALL_CORE_FT
PRINCIPAL PROCEDURE  Procedure: CABG, with saphenous vein graft  Findings and Treatment: C3L (LIMA-LAD, SVG-OM1, SVG-OM2)

## 2022-12-06 NOTE — DISCHARGE NOTE NURSING/CASE MANAGEMENT/SOCIAL WORK - PATIENT PORTAL LINK FT
You can access the FollowMyHealth Patient Portal offered by [facility] by registering at the following website: [email or web address]. By joining [facility]’s FollowMyHealth portal, you will also be able to view your health information using other applications (apps) compatible with our system.

## 2022-12-06 NOTE — PROGRESS NOTE ADULT - SUBJECTIVE AND OBJECTIVE BOX
Patient did not sleep well due to a band of pain around her chest overnight.   Now has since resolved.  Now agreeable to AR.     REVIEW OF SYSTEMS  Constitutional - No fever,  +fatigue  Neurological - No headaches, No memory loss, +loss of strength, No tremors  Musculoskeletal - No joint pain, +joint swelling, No muscle pain    FUNCTIONAL PROGRESS   PT  Sit-Stand Transfer Training  Transfer Training Sit-to-Stand Transfer: Daniel  Transfer Training Stand-to-Sit Transfer: Daniel    Gait Training  Gait Trainin feet RW Daniel  Gait Analysis: pt declining further ambulation due to fatigue, decreased gait velocity and activity tolerance, decreased natty step length     VITALS  T(C): 36.6 (22 @ 08:14), Max: 36.8 (22 @ 16:21)  HR: 80 (22 @ 08:14) (77 - 90)  BP: 111/61 (22 @ 08:14) (108/73 - 129/85)  RR: 18 (22 @ 08:14) (18 - 18)  SpO2: 96% (22 @ 08:14) (95% - 97%)  Wt(kg): --    MEDICATIONS   acetaminophen     Tablet .. 650 milliGRAM(s) every 6 hours PRN  apixaban 2.5 milliGRAM(s) every 12 hours  ascorbic acid 500 milliGRAM(s) daily  aspirin enteric coated 81 milliGRAM(s) daily  atorvastatin 80 milliGRAM(s) at bedtime  chlorhexidine 2% Cloths 1 Application(s) <User Schedule>  diphenhydrAMINE 25 milliGRAM(s) every 4 hours PRN  ferrous    sulfate 325 milliGRAM(s) daily  folic acid 1 milliGRAM(s) daily  furosemide    Tablet 40 milliGRAM(s) daily  levothyroxine 150 MICROGram(s) daily  lidocaine   4% Patch 1 Patch daily  megestrol Suspension 400 milliGRAM(s) daily  metoprolol tartrate 50 milliGRAM(s) two times a day  pantoprazole    Tablet 40 milliGRAM(s) before breakfast  polyethylene glycol 3350 17 Gram(s) daily  senna 2 Tablet(s) at bedtime  sertraline 100 milliGRAM(s) daily  sodium chloride 0.9% lock flush 3 milliLiter(s) every 8 hours      RECENT LABS/IMAGING                          9.1    15.12 )-----------( 65       ( 06 Dec 2022 06:17 )             28.2     12    139  |  101  |  41.6<H>  ----------------------------<  100<H>  3.6   |  29.0  |  1.04    Ca    8.1<L>      06 Dec 2022 06:17  Mg     1.7         TPro  5.0<L>  /  Alb  2.4<L>  /  TBili  1.3  /  DBili  x   /  AST  82<H>  /  ALT  101<H>  /  AlkPhos  296<H>                        CXR  - Latest film shows catheter right chest tube inserted with diminished right effusion. Persistent mild left base findings.    CXR  -HEART:  Enlarged left atrial appendage clip. Prosthetic mitral valve. LUNGS: Small right effusion. There is opacity at the left base compatible with effusion/infiltrate. When taking into account difference in technique, there is no significant change.BONES: degenerative changes    TTE  - Summary:   1. Left ventricular ejection fraction, by visual estimation, is 35 to   40%.   2. Moderately decreased global left ventricular systolic function.   3. Entire apex, mid and apical anterior septum, and mid inferolateral   segment are abnormal as described above.   4. Abnormal septal motion consistent with post-operative status.   5. Mildly increased LV wall thickness.   6. A bioprosthetic valve is present in the mitral position appears well   seated. Mean transmitral gradient of 6 mmHg (HR- 84 bpm).   7. Trace mitral valve regurgitation.   8. Mild tricuspid regurgitation.   9. Estimated pulmonary artery systolic pressure is 38.5 mmHg assuming a   right atrial pressure of 3 mmHg, which is consistent with borderline   pulmonary hypertension.  10. Moderate pleural effusion in the left lateral region.  11. There is no evidence of pericardial effusion.  12. Compared to prior imaging 2022, the left ventricular function   remains moderately decreased.      ----------------------------------------------------------------------------------------  PHYSICAL EXAM  Constitutional - NAD, Uncomfortable  Extremities - BLE edema  Neurologic Exam -                    Cognitive - AAOx4     Motor -                     LEFT    UE - ShAB 4/5, EF 4/5, EE 4/5,  5/5                    RIGHT UE - ShAB 4/5, EF 4/5, EE 4/5,  5/5                    LEFT    LE - HF 1/5, KE 3/5, DF 4/5, PF 4/5                    RIGHT LE - HF 1/5, KE 3/5, DF 4/5, PF 4/5         Sensory - Intact to LT  Psychiatric - Fatigued  ----------------------------------------------------------------------------------------  ASSESSMENT/PLAN  82yFemale with functional deficits after having elective CABG x3, MVR and left atrial appendage clipping  Cardiopulmonary - ASA, Lasix, Lopressor   Pain - Tylenol, Lidoderm  Mood - Zoloft  DVT PPX - SCDs, Eliquis  Rehab - Recommend ACUTE inpatient rehabilitation for the functional deficits consisting of 3 hours of therapy/day & 24 hour RN/daily PMR physician for comorbid medical management. Will continue to follow for ongoing rehab needs and recommendations. Patient will be able to tolerate 3 hours a day.    Continue bedside therapy as well as OOB throughout the day with mobilization throughout the day with staff to maintain cardiopulmonary function and prevention of secondary complications related to debility.     Discussed with rehab clinical team.

## 2022-12-06 NOTE — DISCHARGE NOTE PROVIDER - DETAILS OF MALNUTRITION DIAGNOSIS/DIAGNOSES
This patient has been assessed with a concern for Malnutrition and was treated during this hospitalization for the following Nutrition diagnosis/diagnoses:     -  11/25/2022: Moderate protein-calorie malnutrition

## 2022-12-06 NOTE — PROGRESS NOTE ADULT - SUBJECTIVE AND OBJECTIVE BOX
Subjective: Patient seen and assessed s/p CABG, MVR. Patient states "I've been doing better everyday" At time of exam, patient reports itchiness to MSI (healing appropriately). Otherwise, Pt denies chest pain, palpitations, dizziness, headache, shortness of breath, abdominal pain or N/V/D/C.    Pertinent events of the past 24 hours: TSH elevated, synthroid increased     VITAL SIGNS  Vital Signs Last 24 Hrs  T(C): 36.8 (22 @ 01:21), Max: 36.8 (22 @ 08:00)  T(F): 98.2 (22 @ 01:21), Max: 98.3 (22 @ 08:00)  HR: 77 (22 @ 01:21) (76 - 90)  BP: 108/73 (22 @ 01:21) (108/73 - 129/85)  RR: 18 (22 @ 01:21) (18 - 18)  SpO2: 95% (22 @ 01:21) (95% - 98%)  on (O2)              Telemetry/Alarms:  NSR BBB 70-90s     MEDICATIONS  acetaminophen     Tablet .. 650 milliGRAM(s) Oral every 6 hours PRN  apixaban 2.5 milliGRAM(s) Oral every 12 hours  ascorbic acid 500 milliGRAM(s) Oral daily  aspirin enteric coated 81 milliGRAM(s) Oral daily  atorvastatin 80 milliGRAM(s) Oral at bedtime  chlorhexidine 2% Cloths 1 Application(s) Topical <User Schedule>  diphenhydrAMINE 25 milliGRAM(s) Oral every 4 hours PRN  ferrous    sulfate 325 milliGRAM(s) Oral daily  folic acid 1 milliGRAM(s) Oral daily  furosemide    Tablet 40 milliGRAM(s) Oral daily  levothyroxine 150 MICROGram(s) Oral daily  lidocaine   4% Patch 1 Patch Transdermal daily  megestrol Suspension 400 milliGRAM(s) Oral daily  metoprolol tartrate 50 milliGRAM(s) Oral two times a day  pantoprazole    Tablet 40 milliGRAM(s) Oral before breakfast  polyethylene glycol 3350 17 Gram(s) Oral daily  senna 2 Tablet(s) Oral at bedtime  sertraline 100 milliGRAM(s) Oral daily  sodium chloride 0.9% lock flush 3 milliLiter(s) IV Push every 8 hours    PHYSICAL EXAM  Constitutional: NAD  Neuro: A+O x 3, non-focal, speech clear and intact  CV: regular rate, regular rhythm, +S1S2, no murmurs or rub  Pulm/chest: breath sounds diminished, no accessory muscle use noted  Abd: +BS, soft, NT, ND  Ext: MOSHER x 4, +1/2 LE edema, +pedal pulses   Skin: warm, well perfused  Psych: calm, appropriate affect  Incision: midline sternal incision open to air, well approximated, no audible sternal click, Left vein harvest site open to air, well approximated    Intake and Output   @ :  -   @ 07:00  --------------------------------------------------------  IN: 840 mL / OUT: 1000 mL / NET: -160 mL     @ 07:  -   @ 01:36  --------------------------------------------------------  IN: 720 mL / OUT: 0 mL / NET: 720 mL    Weights:  Daily     Daily Weight in k.9 (05 Dec 2022 04:16)  Admit Wt: Drug Dosing Weight  Height (cm): 160 (2022 21:15)  Weight (kg): 65.4 (2022 21:15)  BMI (kg/m2): 25.5 (2022 21:15)  BSA (m2): 1.68 (2022 21:15)    All laboratory results, radiology and medications reviewed.    LABS      139  |  101  |  43.8<H>  ----------------------------<  105<H>  4.1   |  28.0  |  1.07    Ca    8.6      05 Dec 2022 06:05  Mg     1.9         TPro  5.8<L>  /  Alb  2.9<L>  /  TBili  1.4  /  DBili  0.5<H>  /  AST  109<H>  /  ALT  114<H>  /  AlkPhos  361<H>                                   10.3   16.18 )-----------( 69       ( 05 Dec 2022 06:05 )             31.3          PT/INR - ( 04 Dec 2022 06:08 )   PT: 13.2 sec;   INR: 1.14 ratio           Bilirubin Total, Serum: 1.4 mg/dL ( @ 06:05)  Bilirubin Direct, Serum: 0.5 mg/dL ( @ 06:05)    < from: Xray Chest 1 View- PORTABLE-Routine (Xray Chest 1 View- PORTABLE-Routine in AM.) (22 @ 05:31) >    IMPRESSION:  Trace to small loculated right pleural effusion, not   significantly changed.    No significant interval change in left basilar and retrocardiac opacity   which may be due to a left pleural effusion with passive atelectasis,   atelectasis of other cause, and/or pneumonia in the appropriate clinical   context.    < end of copied text >    < from: TTE Echo Limited or F/U (22 @ 12:35) >    Summary:   1. Left ventricular ejection fraction, by visual estimation, is 35 to   40%.   2. Moderately decreased global left ventricular systolic function.   3. Entire apex, mid and apical anterior septum, and mid inferolateral   segment are abnormal as described above.   4. Abnormal septal motion consistent with post-operative status.   5. Mildly increased LV wall thickness.   6. A bioprosthetic valve is present in the mitral position appears well   seated. Mean transmitral gradient of 6 mmHg (HR- 84 bpm).   7. Trace mitral valve regurgitation.   8. Mild tricuspid regurgitation.   9. Estimated pulmonary artery systolic pressure is 38.5 mmHg assuming a   right atrial pressure of 3 mmHg, which is consistent with borderline   pulmonary hypertension.  10. Moderate pleural effusion in the left lateral region.  11. There is no evidence of pericardial effusion.  12. Compared to prior imaging 2022, the left ventricular function   remains moderately decreased.    < end of copied text >    < from: 12 Lead ECG (22 @ 04:23) >    Ventricular Rate 84 BPM    Atrial Rate 84 BPM    P-R Interval 208 ms    QRS Duration 134 ms    Q-T Interval 428 ms    QTC Calculation(Bazett) 505 ms    P Axis 76 degrees    R Axis 41 degrees    T Axis 162 degrees    Diagnosis Line Sinus rhythm withPremature supraventricular complexes  with 1st degree A-V block  Left bundle branch block  Abnormal ECG    < end of copied text >

## 2022-12-06 NOTE — DISCHARGE NOTE PROVIDER - CARE PROVIDER_API CALL
Rogelio Ackerman)  Surgery; Thoracic and Cardiac Surgery  [address] [place] [address]  Phone: [phone]  Fax: [phone]  Follow Up Time:

## 2022-12-06 NOTE — DISCHARGE NOTE PROVIDER - NSDCACTIVITY_GEN_ALL_CORE
Bathing allowed/Sex allowed/Do not drive or operate machinery/Stairs allowed/No heavy lifting/straining/Follow Instructions Provided by your Surgical Team

## 2022-12-06 NOTE — PROGRESS NOTE ADULT - REASON FOR ADMISSION
NSTEMI, CAD with chest pain

## 2022-12-06 NOTE — PROGRESS NOTE ADULT - PROBLEM SELECTOR PROBLEM 5
HLD (hyperlipidemia)
HLD (hyperlipidemia)
Non-ST elevation MI (NSTEMI)
no
HLD (hyperlipidemia)
Non-ST elevation MI (NSTEMI)

## 2022-12-06 NOTE — PROGRESS NOTE ADULT - PROBLEM SELECTOR PLAN 7
Protonix for GI prophylaxis.  Eliquis & SCDs for DVT prophylaxis.    Plan to be discussed with Dr. Ackerman and CTS team during AM rounds.
Protonix for GI prophylaxis.  Heparin subcutaneously & SCDs for DVT prophylaxis.    Plan to be discussed with Dr. Ackerman and CTS team during AM rounds.
Protonix for GI prophylaxis.  Heparin subcutaneously & SCDs for DVT prophylaxis.    Plan to be discussed with Dr. Ackerman and CTS team during AM rounds.
Protonix for GI prophylaxis.  Heparin/SCDs for DVT prophylaxis.      Plan to be discussed with Dr. Ackerman and CTS team during AM rounds.
Protonix for GI prophylaxis.  Heparin subcutaneously & SCDs for DVT prophylaxis.    Plan to be discussed with Dr. Ackerman and CTS team during AM rounds.
Protonix for GI prophylaxis.  Heparin subcutaneously & SCDs for DVT prophylaxis.    Plan to be discussed with Dr. Ackerman and CTS team during AM rounds.

## 2022-12-06 NOTE — PROGRESS NOTE ADULT - PROBLEM SELECTOR PLAN 3
on nitroglycerin gtt now, HTN controlled
b/p elevated but dropped with AGUS  will not start ace/arb due to need for Cardiac surgery  add Norvasc 2.5mg qd  HR was in 60's yesterday now         Cardiac Meds  aspirin enteric coated 81 milliGRAM(s) Oral daily  atorvastatin 80 milliGRAM(s) Oral at bedtime  heparin  Infusion. 450 Unit(s)/Hr (4.5 mL/Hr) IV Continuous <Continuous>  metoprolol tartrate 25 milliGRAM(s) Oral every 6 hours
S/p MVR / CABG as above.
as above   s/p MVR / CABG
.  - cotn statin
S/p MVR / CABG as above.
as above   s/p MVR / CABG
currently requiring Angelo gtt
Care as per primary team
as above   s/p MVR / CABG
S/p MVR / CABG as above.
S/p MVR / CABG as above.
currently +/- levophed   beta blocker when able
S/p MVR / CABG as above.

## 2022-12-06 NOTE — DISCHARGE NOTE NURSING/CASE MANAGEMENT/SOCIAL WORK - NSDCFUADDAPPT_GEN_ALL_CORE_FT
Please follow up with Dr. Ackerman for post op appt set for December 21st at 9:30am. Please arrive 15 minutes early.     Your Care Navigator Nurse Practitioner will be in touch to see you in your home within a few days from discharge. The Follow Your [facility] program can help ensure you understand your medications, discharge instructions and answer any questions you may have at that time. They are also a great source to address concerns during the day and may be reached at [phone].     Please call the Cardiothoracic Surgery office at [phone] if you are experiencing any shortness of breath, chest pain, fevers or chills, drainage from the incisions, persistent nausea, vomiting or if you have any questions about your medications. If the symptoms are severe, call [phone] and go to the nearest hospital. You can also call [phone] for an emergency [facility] ambulance, which will take you to the closest [facility] Hospital.    If you need any assistance for making any appointments for a new consult or referral in any specialty, please call our [facility] Clinical Coordination Center at [phone].

## 2022-12-06 NOTE — PROGRESS NOTE ADULT - PROBLEM SELECTOR PROBLEM 4
HTN (hypertension)
HLD (hyperlipidemia)
HTN (hypertension)
HTN (hypertension)
HLD (hyperlipidemia)
HLD (hyperlipidemia)
HTN (hypertension)
HLD (hyperlipidemia)

## 2022-12-06 NOTE — DISCHARGE NOTE PROVIDER - NSDCFUSCHEDAPPT_GEN_ALL_CORE_FT
Amor Barros  [facility]  [facility] [address]  Scheduled Appointment: 12/21/2022

## 2022-12-06 NOTE — PROGRESS NOTE ADULT - PROBLEM SELECTOR PROBLEM 3
Severe mitral regurgitation by prior echocardiogram
HTN (hypertension)
Severe mitral regurgitation by prior echocardiogram
Severe mitral regurgitation by prior echocardiogram
HTN (hypertension)
HLD (hyperlipidemia)
Severe mitral regurgitation by prior echocardiogram
Severe mitral regurgitation by prior echocardiogram
HTN (hypertension)
Severe mitral regurgitation by prior echocardiogram

## 2022-12-06 NOTE — PROGRESS NOTE ADULT - PROBLEM SELECTOR PLAN 4
beta blocker when able
Continue lopressor as tolerated by HR and BP.
Continue statin
beta blocker when able
Continue lopressor as tolerated by HR and BP.
cont BB as tolerated
Continue statin
Continue statin
Continue lopressor as tolerated by HR and BP.
Continue statin  patinet education   diet

## 2022-12-06 NOTE — PROGRESS NOTE ADULT - PROBLEM SELECTOR PROBLEM 2
Non-ST elevation MI (NSTEMI)
Severe mitral regurgitation by prior echocardiogram
Non-ST elevation MI (NSTEMI)
Severe mitral regurgitation by prior echocardiogram
Severe mitral regurgitation by prior echocardiogram
Non-ST elevation MI (NSTEMI)
Severe mitral regurgitation by prior echocardiogram

## 2022-12-06 NOTE — PROGRESS NOTE ADULT - PROBLEM SELECTOR PLAN 1
S/p CABG X 3, MVR(t), DEVONTE Clip on 11/27/22.   IABP removed 11/28.   Neurologically intact.  Hemodynamically stable.  Continue lopressor as tolerated by HR and BP.   Continue aspirin for acute graft closure prophylaxis.  Continue statin for chronic graft patency prophylaxis.  Oxygenating well, coughing and deep breathing exercises/incentive spirometry encouraged.  Follow up AM CXR.   Continue diuresis with Lasix 40PO QD. Monitor strict I/Os. Replenish electrolytes PRN to keep K>4 and Mg>2.   Continue with PT, increase activity as tolerated.  Tylenol, Lidocaine patch, and Oxy PRN for analgesia.  Continue bowel regimen PRN constipation.   Continue Eliquis for valve prophylaxis  Dispo: Acute Rehab.   Case and plan to be reviewed / discussed with CT Surgery attending / team during AM rounds.
CV following for CABG  Balloon pump - placed 2:1 due to hypotension with  AGUS  will plan on d/cing pump later today if b/p ok
.  - 11/27 CABG-  C3L (LIMA-LAD, SVG-OM1, SVG-OM2); clipping DEVONTE, Mitral valve replacement  - remains on milrinone, insulin. extubated in early am   - managemnt as per CTSX team  - cont asa, statin  - Pt transfer from [facility]. follow up with primary cardiologist after discharge
R-IABP removed 11/25, hypotensive/bradycardic afterwards now requiring Angelo gtt  L-IABP re-inserted 11/25 by Cardiology  Will transfer to CTICU under the care of Dr. Ackerman after [facility] completed   Continue Heparin gtt and treat ACS as needed  Continue Preop work up  --  Preop work up:   carotid US negative for carotid stenosis,   PFTs to eval lung function PENDING  AGUS completed 11/25 - normal EF, moderate MR (with IABP)  Lab work: TSH 1.32, prealbumin PENDING, Hgb A1C 5.4, P2Y12 318, BNP 20599, T&S - completed, MRSA neg /MSSA pos, and UA- negative.   --  Discussed with Dr. Ackerman
S/p CABG X 3, MVR(t), DEVONTE Clip on 11/27/22.   IABP removed 11/28.   Neurologically intact.  Hemodynamically stable.  Continue lopressor as tolerated by HR and BP.   Continue aspirin for acute graft closure prophylaxis.  Continue statin for chronic graft patency prophylaxis.  Oxygenating well, coughing and deep breathing exercises/incentive spirometry encouraged.  Follow up AM CXR.   Left and substernal CT from OR, with Right pigtail for pleural effusion. Plan to remove CTs if output remains minimal.   Continue diuresis with Lasix 40PO QD. Monitor strict I/Os. Replenish electrolytes PRN to keep K>4 and Mg>2.   Morrison remains in place for accurate I/Os and due to CHAVO. Will discuss removing in AM rounds with renal function continues to improve.   Continue with PT, increase activity as tolerated.  Tylenol, Lidocaine patch, and Oxy PRN for analgesia.  Continue bowel regimen PRN constipation.   Dr. Ackerman to decide on Coumadin vs Eliquis for chemical anticoagulation.   Dispo: Acute Rehab.   Case and plan to be reviewed / discussed with CT Surgery attending / team during AM rounds.
Currently on IABP and heparin drip  On no inotropes or pressors at this time  Probable plan  to wean IABP tomorrow but will evaluate  Continue work up for open heart    Can eat but please make npo after midnight in case she need a AGUS on 11/25    Plan discussed with Dr Ackerman
S/p CABG X 3, MVR(t), DEVONTE Clip on 11/27/22.   IABP removed 11/28.   Neurologically intact.  Hemodynamically stable.  Continue lopressor as tolerated by HR and BP.   Continue aspirin for acute graft closure prophylaxis.  Continue statin for chronic graft patency prophylaxis.  Oxygenating well, coughing and deep breathing exercises/incentive spirometry encouraged.  Follow up AM CXR.   Continue diuresis with Lasix 40PO QD. Monitor strict I/Os. Replenish electrolytes PRN to keep K>4 and Mg>2.   Continue with PT, increase activity as tolerated.  Tylenol, Lidocaine patch, and Oxy PRN for analgesia.  Continue bowel regimen PRN constipation.   Dr. Ackerman to decide on Coumadin vs Eliquis for chemical anticoagulation.   Dispo: Acute Rehab.   Case and plan to be reviewed / discussed with CT Surgery attending / team during AM rounds.
s/p CABG   cont beta blocker as tolerated   pain control prn   continue bowel regimen   coughing, chest pt and IS   Ambulate with PT   f/u when to start Coumadin for MVR  s/p IABP removal, 1 unit of PRBC given 2/2 to groin bleeding with lasix 40 IVP.  maintain K+>4.2 and Mg >2.0  cont IV diuresis
R-IABP removed 11/25, hypotensive/bradycardic afterwards now requiring Angelo gtt  L-IABP re-inserted 11/25 by Cardiology  Will transfer to CTICU under the care of Dr. Ackerman after [facility] completed   Continue Heparin gtt and treat ACS as needed  Continue Preop work up  --  Preop work up:   carotid US negative for carotid stenosis,   PFTs to eval lung function PENDING  AGUS completed 11/25 - normal EF, moderate MR (with IABP)  Lab work: TSH 1.32, prealbumin PENDING, Hgb A1C 5.4, P2Y12 318, BNP 03389, T&S - completed, MRSA neg /MSSA pos, and UA- negative.   --  Discussed with Dr. Ackerman
s/p CABG   Maintain primacor at 0.3  pending beta blocker   pain control prn   continue bowel regimen   coughing, chest pt and IS   Ambulate with PT   f/u when to start Coumadin for MVR  s/p IABP removal, 1 unit of PRBC given 2/2 to groin bleeding with lasix 40 IVP.  maintain K+>4.2 and Mg >2.0
S/p CABG X 3, MVR(t), DEVONTE Clip on 11/27/22.   IABP removed 11/28.   Neurologically intact.  Hemodynamically stable.  Continue lopressor as tolerated by HR and BP.   Continue aspirin for acute graft closure prophylaxis.  Continue statin for chronic graft patency prophylaxis.  Oxygenating well, coughing and deep breathing exercises/incentive spirometry encouraged.  Follow up AM CXR.   Continue diuresis with Lasix 40PO QD. Monitor strict I/Os. Replenish electrolytes PRN to keep K>4 and Mg>2.   Continue with PT, increase activity as tolerated.  Tylenol, Lidocaine patch, and Oxy PRN for analgesia.  Continue bowel regimen PRN constipation.   Dr. Ackerman to decide on Coumadin vs Eliquis for chemical anticoagulation.   Dispo: Acute Rehab.   Case and plan to be reviewed / discussed with CT Surgery attending / team during AM rounds.
s/p CABG   Maintain primacor at 0.4 readdress weaning in the AM.   pending beta blocker   pain control prn   coughing, chest pt and IS   f/u when to start Coumadin for MVR  s/p IABP removal, 1 unit of PRBC given 2/2 to groin bleeding with lasix 40 IVP.
S/p CABG X 3, MVR(t), DEVONTE Clip on 11/27/22.   IABP removed 11/28.   Neurologically intact.  Hemodynamically stable.  Continue lopressor as tolerated by HR and BP.   Continue aspirin for acute graft closure prophylaxis.  Continue statin for chronic graft patency prophylaxis.  Oxygenating well, coughing and deep breathing exercises/incentive spirometry encouraged.  Follow up AM CXR.   Right pigtail for pleural effusion. Plan to remove CT if output remains minimal.   Continue diuresis with Lasix 40PO QD. Monitor strict I/Os. Replenish electrolytes PRN to keep K>4 and Mg>2.   Continue with PT, increase activity as tolerated.  Tylenol, Lidocaine patch, and Oxy PRN for analgesia.  Continue bowel regimen PRN constipation.   Dr. Ackerman to decide on Coumadin vs Eliquis for chemical anticoagulation.   Dispo: Acute Rehab.   Case and plan to be reviewed / discussed with CT Surgery attending / team during AM rounds.
s.p CABG   IABP remains in place   currently requiring priamcor and levophed for cardiogenic shock   pending beta blocker   pain control prn   coughing, chest pt and IS when able   also with pre op MR - since s.p MVR

## 2022-12-06 NOTE — DISCHARGE NOTE PROVIDER - HOSPITAL COURSE
82 year old female with a medical history of CAD, LBBB, HTN, HLD admitted to [facility] after experiencing acute onset chest pain, found to have NSTEMI, s/p cardiac cath that revealed triple vessel CAD with IABP placed, placed on Heparin gtt and loaded with Plavix.  TTE also confirmed Severe MR. Patient transferred to [facility] ICU for surgical evaluation and medical management on 11/23. IABP d/c'd 11/25 with subsequent hypotension, bradycardia, +NSTEMI, with patient urgently brought to cath lab and IABP reinserted in subsequent groin.  Pt transferred to CTICU under Dr. Ackerman service. Patient underwent CABG X 3, MVR(t), DEVONTE Clip on 11/27/22. IABP removed 11/28. Postoperative course significant for a slow wean of primacor for cardiogenic shock and levophed for hypotension, ABLA requiring blood, acute hypoxic respiratory failure (since resolved), hypervolemia (resolving with diuretics), and CHAVO due to cardiorenal syndrome (resolved).      Dispo- acute rehab pending bed - Pt is planned for discharge to [facility] today   Pt has post op appt set for December 21st at 9:30am.     General: NAD  Neuro: A+O x 3, non-focal, speech clear and intact  HEENT: PERRL, EOMI, oral mucosa pink and moist  Neck: supple, no JVD  CV: regular rate, regular rhythm, +S1S2, no murmurs or rub  Pulm/chest: lung sounds CTA and equal bilaterally, no accessory muscle use noted  Abd: soft, NT, ND, +BS  : normal and skin intact   Ext: MOSHER x 4, no C/C/E  Skin: warm, well perfused     EVH site c.d.i, mid sternal incision c/d/i 82 year old female with a medical history of CAD, LBBB, HTN, HLD admitted to [facility] after experiencing acute onset chest pain, found to have NSTEMI, s/p cardiac cath that revealed triple vessel CAD with IABP placed, placed on Heparin gtt and loaded with Plavix.  TTE also confirmed Severe MR. Patient transferred to [facility] ICU for surgical evaluation and medical management on 11/23. IABP d/c'd 11/25 with subsequent hypotension, bradycardia, +NSTEMI, with patient urgently brought to cath lab and IABP reinserted in subsequent groin.  Pt transferred to CTICU under Dr. Ackerman service. Patient underwent CABG X 3, MVR(t), DEVONTE Clip on 11/27/22. IABP removed 11/28. Postoperative course significant for a slow wean of primacor for cardiogenic shock and levophed for hypotension, ABLA requiring blood, acute hypoxic respiratory failure (since resolved), hypervolemia (resolving with diuretics), and CHAVO due to cardiorenal syndrome (resolved).    WBC is elevated likely due to post op SIRS. Dr. Ackerman aware. Pt stable for discharge to [facility].   Dispo- acute rehab pending bed - Pt is planned for discharge to [facility] today   Pt has post op appt set for December 21st at 9:30am.     General: NAD  Neuro: A+O x 3, non-focal, speech clear and intact  HEENT: PERRL, EOMI, oral mucosa pink and moist  Neck: supple, no JVD  CV: regular rate, regular rhythm, +S1S2, no murmurs or rub  Pulm/chest: lung sounds CTA and equal bilaterally, no accessory muscle use noted  Abd: soft, NT, ND, +BS  : normal and skin intact   Ext: MOSHER x 4, no C/C/E  Skin: warm, well perfused     EVH site c.d.i, mid sternal incision c/d/i

## 2022-12-06 NOTE — PROGRESS NOTE ADULT - ASSESSMENT
82 female with LBBB, HTN, HLD, MVP, hypothyroidism, remote breast cancer, presented to [facility] 11/22 with NSTEMI, [facility] revealed multivessel disease, IABP placed andpatient transferred to [facility] for cardiac surgery eval.      NSTEMI  - ASA, statin, BB, no plavix while awaiting decision on CABG  - heparin drip while on balloon pump  - cardiac surgery following  - s/p AGUS  Plan to remove IABP today    Hypothyroidism  - synthroid    Hypertension  - amlodipine    Sedation/Analgesia: none  Vasoactive medications: none  DVT prophylaxis: heparin  GI prophylaxis: protonix  Nutrition: dash diet  Central line: remove today  Mobility: bedrest while balloon pump in  Patient updated on plan of care for today    
82 year old female with PMH of HTN, HLD, LBBB, mitral valve prolapse, hypothyroidism, breast CA 10 years ago s/p chemotherapy and left mastectomy was initially admitted at [facility] for NSTEMI s/p cardiac catheterization which showed severe LAD, LCx, and occlusion of RCA with ventriculogram showing EF 30% and severe MR. She was subsequently transferred to [facility] s/p MVR + CABG + L atrial appendage clipping on 11/27. Hospital course is notable for CHAVO (improving).     -Baseline creatinine ~0.8mg/dL  -While here creatinine peaked at 1.7mg/dL; improved today to 1.3mg/dL  -Suspecting CHAVO to be due to cardiorenal syndrome   -UA no protein, no blood   -UACR 76mg/g; UPCR 0.4g/g   -Will hold off on renal ultrasound as creatinine is improving, so obstruction is less likely   -BP acceptable  -Volume Status - remains hypervolemic on exam - continue diuretics (lasix 40mg PO daily)    Leodan Rivera DO  Nephrology  [facility]  [address] [place] [address]  Office Number [phone]  
83yo F w/ CAD, HTN, HLD, known LBBB transferred from [facility] this evening after she was found to have NSTEMI and underwent cardiac cath that revealed triple vessel CAD, placed on Heparin gtt and loaded with Plavix.  TTE also confirmed severe MR currently in MICU for further evaluation and medical management.  + [place] rebeca Cath in place, patient denies chest pain, diaphoresis, dyspnea, palpitations, fever, chills or HA.  Stable hemodynamically  ECG: NSR old LBBB     
82 year old female with PMH of HTN, HLD, LBBB, mitral valve prolapse, hypothyroidism, breast CA 10 years ago s/p chemotherapy and left mastectomy was initially admitted at [facility] for NSTEMI s/p cardiac catheterization which showed severe LAD, LCx, and occlusion of RCA with ventriculogram showing EF 30% and severe MR. She was subsequently transferred to [facility] s/p MVR / CABG / L atrial appendage clipping on 11/27. Hospital course is notable for CHAVO.     -Baseline creatinine ~0.8mg/dL  -While here creatinine peaked at 1.7mg/dL; improved today to 1mg/dL  -Suspecting CHAVO to be due to cardiorenal syndrome   -UA no protein, no blood   -UACR 76mg/g; UPCR 0.4g/g   -BP acceptable  -Volume Status - continue lasix 40    Nephrology will sign off, thanks for the consult.        
82 year old female with a medical history of CAD, LBBB, HTN, HLD admitted to [facility] after experiencing acute onset chest pain, found to have NSTEMI, s/p cardiac cath that revealed triple vessel CAD with IABP placed, placed on Heparin gtt and loaded with Plavix.  TTE also confirmed Severe MR. Patient transferred to [facility] ICU for surgical evaluation and medical management on 11/23. IABP d/c'd 11/25 with subsequent hypotension, bradycardia, +NSTEMI, with patient urgently brought to cath lab and IABP reinserted in subsequent groin.  Pt transferred to CTICU under Dr. Ackerman service. Patient underwent CABG X 3, MVR(t), DEVONTE Clip on 11/27/22. IABP removed 11/28. Postoperative course significant for a slow wean of primacor for cardiogenic shock and levophed for hypotension, ABLA requiring blood, acute hypoxic respiratory failure (since resolved), hypervolemia (resolving with diuretics), and CHAVO due to cardiorenal syndrome (resolving).
82 year old female with PMH of HTN, HLD, LBBB, mitral valve prolapse, hypothyroidism, breast CA 10 years ago s/p chemotherapy and left mastectomy was initially admitted at [facility] for NSTEMI s/p cardiac catheterization which showed severe LAD, LCx, and occlusion of RCA with ventriculogram showing EF 30% and severe MR. She was subsequently transferred to [facility] s/p MVR / CABG / L atrial appendage clipping on 11/27. Hospital course is notable for CHAVO.     -Baseline creatinine ~0.8mg/dL  -While here creatinine peaked at 1.7mg/dL; improved today to 1.4mg/dL  -Suspecting CHAVO to be due to cardiorenal syndrome   -UA no protein, no blood   -UACR 76mg/g; UPCR 0.4g/g   -BP acceptable  -Volume Status - remains hypervolemic on exam - continue diuretics         
82 year old female with a medical history of CAD, LBBB, HTN, HLD admitted to [facility] after experiencing acute onset chest pain, found to have NSTEMI, s/p cardiac cath that revealed triple vessel CAD with IABP placed, placed on Heparin gtt and loaded with Plavix.  TTE also confirmed Severe MR. Patient transferred to [facility] ICU for surgical evaluation and medical management on 11/23. IABP d/c'd 11/25 with subsequent hypotension, bradycardia, +NSTEMI, with patient urgently brought to cath lab and IABP reinserted in subsequent groin.  Pt transferred to CTICU under Dr. Ackerman service. Patient underwent CABG X 3, MVR(t), DEVONTE Clip on 11/27/22. IABP removed 11/28. Postoperative course significant for a slow wean of primacor for cardiogenic shock and levophed for hypotension, ABLA requiring blood, acute hypoxic respiratory failure (since resolved), hypervolemia (resolving with diuretics), and CHAVO due to cardiorenal syndrome (resolving).    dispo- acute rehab pending bed - now patient is concerned about covid and does not want to go 
82F PMH LBBB, MVP, breast CA s/p chemotx and L mastectomy, HTN, HLD, hypothyroidism, admitted to [place] 11/22/22 with radiating chest pain, f/w NSTEMI s/p [facility] with severe 3VD, acute systolic CHF, and severe MR, had IABP and [place] catheter placed and transferred to [facility] ICU for further care    Impression/Plan:    3V CAD  HFrEF  Severe MR, h/o MVP  - S/p IABP 1:1 augmentation  - Monitoring hemodynamics closely  - Monitor for hemolysis, anemia  - Heparin infusion, serial PTT, monitor for s/s bleeding  - Monitor B/L groin sites B/L, doppler for pulses  - CTS for CABG evaluation  - GDMT - Lipitor 80, Lopressor 25mg Q6H, ASA 81  - Holding on DAPT given possible CABG  - F/u repeat TTE  - Monitor cardiac rhythm on telemetry  - Monitor UOP, Cr    Replete lytes PRN  NPO for now until surgical plan elucidated  PIV; B/L groin catheters in place  Full code  C/w care in ICU      Jony Tello M.D.  , Pulmonary & Critical Care Medicine  [facility]  Pulmonary and Sleep Medicine at [place]  [address]  [place] [address]  T: [phone]  F: [phone]        
83yo F w/ PMHx of CAD, LBBB, HTN, HLD admitted to [facility] after experiencing acute onset chest pain, found to have NSTEMI and underwent cardiac cath that revealed triple vessel CAD, placed on Heparin gtt and loaded with Plavix.  TTE also confirmed severe MR. Patient transferred to [facility] ICU for surgical evaluation and medical management on 11/23. 
81yo F w/ PMHx of CAD, LBBB, HTN, HLD admitted to [facility] after experiencing acute onset chest pain, found to have NSTEMI and underwent cardiac cath that revealed triple vessel CAD, placed on Heparin gtt and loaded with Plavix.  TTE also confirmed severe MR. Patient transferred to [facility] ICU for surgical evaluation and medical management on 11/23. Had intraoperative IABP that was d/c'd 11/25 pt subsequently became bradycardic and hypotensive.  Urgently brought to cath labs and IABP reinserted in subsequent groin.  Pt transferred to CTICU Dr Ackerman service.  +NSTEMI.  condition guarded 
83yo F w/ PMHx of CAD, LBBB, HTN, HLD admitted to [facility] after experiencing acute onset chest pain, found to have NSTEMI and underwent cardiac cath that revealed triple vessel CAD, placed on Heparin gtt and loaded with Plavix.  TTE also confirmed severe MR. Patient transferred to [facility] ICU for surgical evaluation and medical management on 11/23. Had intraoperative IABP that was d/c'd 11/25 pt subsequently became bradycardic and hypotensive.  Urgently brought to cath labs and IABP reinserted in subsequent groin.  Pt transferred to CTICU Dr Ackerman service.  +NSTEMI.  since s.p C3L MVR(t) DEVONTE Clip came ou on primacor, levophed - briefly on dobutamine since d/c for tachycardia - currently remains on priamcor for cardiogenic shock +/- levophed for hypotension   post op also with acute blood loss anemia requiring blood, and acute hypoxic respiratory failure with high oxygen requirement on vent - since resolved weaned off ventilator, currently on RM air. Pt now s/p L IABP removal, removal c/b by some bleeding from groin H & H 9/25, 1 unit of PRBC given for blood loss as  per Dr. Ackerman followed by lasix IVP, post-op course also noted for CHAVO, pt remains on primacor gtt as well as levo. 
81yo F w/ PMHx of CAD, LBBB, HTN, HLD admitted to [facility] after experiencing acute onset chest pain, found to have NSTEMI and underwent cardiac cath that revealed triple vessel CAD, placed on Heparin gtt and loaded with Plavix.  TTE also confirmed severe MR. Patient transferred to [facility] ICU for surgical evaluation and medical management on 11/23. Had intraoperative IABP that was d/c'd 11/25 pt subsequently became bradycardic and hypotensive.  Urgently brought to cath labs and IABP reinserted in subsequent groin.  Pt transferred to CTICU Dr Ackerman service.  +NSTEMI.  since s.p C3L MVR(t) DEVONTE Clip came ou on primacor, levophed - briefly on dobutamine since d/c for tachycardia - currently remains on priamcor for cardiogenic shock +/- levophed for hypotension   post op also with acute blood loss anemia requiring blood, and acute hypoxic respiratory failure with high oxygen requirement on vent - since resolved weaned off ventilator, currently on RM air. Pt now s/p L IABP removal, removal c/b by some bleeding from groin H & H 9/25, 1 unit of PRBC given for blood loss as  per Dr. Ackerman followed by christopher GARVIN.  
83yo F w/ CAD, HTN, HLD, known LBBB transferred from [facility] this evening after she was found to have NSTEMI and underwent cardiac cath that revealed triple vessel CAD, placed on Heparin gtt and loaded with Plavix.  TTE also confirmed severe MR currently in MICU for further evaluation and medical management. +[place] rebeca Cath in place, patient denies chest pain, diaphoresis, dyspnea, palpitations, fever, chills or HA.  Stable hemodynamically  ECG: NSR old LBBB       11/27 CABG-  C3L (LIMA-LAD, SVG-OM1, SVG-OM2); clipping DEVONTE, Mitral valve replacement   11/28- extubated, milrinone, insulin gtts. TVP in place, chest tubes, venti mask, swan, TLC niru HUMMEL,
81yo F w/ PMHx of CAD, LBBB, HTN, HLD admitted to [facility]  after experiencing acute onset chest pain with radiation to back.  Found to have NSTEMI and underwent cardiac cath that revealed triple vessel CAD, placed on Heparin gtt and loaded with Plavix.  TTE also confirmed severe MR. Patient transferred to [facility] ICU for further evaluation and medical management on 11/23.  Patient with right femoral access site (sheet) and [place] Cath in place conformed by bedside CXR this evening.  Patient denies chest pain, diaphoresis, dyspnea, palpitations, fever, chills or HA.  MICU and CT ICU team by the bedside.          Pt remains on heparin gtt with IABP for ACS, elevated troponin, ck, BNP.  Pt to be further evaluated for possible cardiac surgery. Dr. Ryan aware.  Echo completed 11/24 results found above.  
82 year old female with a medical history of CAD, LBBB, HTN, HLD admitted to [facility] after experiencing acute onset chest pain, found to have NSTEMI, s/p cardiac cath that revealed triple vessel CAD with IABP placed, placed on Heparin gtt and loaded with Plavix.  TTE also confirmed Severe MR. Patient transferred to [facility] ICU for surgical evaluation and medical management on 11/23. IABP d/c'd 11/25 with subsequent hypotension, bradycardia, +NSTEMI, with patient urgently brought to cath lab and IABP reinserted in subsequent groin.  Pt transferred to CTICU under Dr. Ackerman service. Patient underwent CABG X 3, MVR(t), DEVONTE Clip on 11/27/22. IABP removed 11/28. Postoperative course significant for a slow wean of primacor for cardiogenic shock and levophed for hypotension, ABLA requiring blood, acute hypoxic respiratory failure (since resolved), hypervolemia (resolving with diuretics), and CHAVO due to cardiorenal syndrome (resolving).    dispo- acute rehab pending bed - now patient is concerned about covid and does not want to go 
81yo F w/ PMHx of CAD, LBBB, HTN, HLD admitted to [facility] after experiencing acute onset chest pain, found to have NSTEMI and underwent cardiac cath that revealed triple vessel CAD, placed on Heparin gtt and loaded with Plavix.  TTE also confirmed severe MR. Patient transferred to [facility] ICU for surgical evaluation and medical management on 11/23. Had intraoperative IABP that was d/c'd 11/25 pt subsequently became bradycardic and hypotensive.  Urgently brought to cath labs and IABP reinserted in subsequent groin.  Pt transferred to CTICU Dr Ackerman service.  +NSTEMI.  since s.p C3L MVR(t) DEVONTE Clip came ou on primacor, levophed - briefly on dobutamine since d/c for tachycardia - currently remains on priamcor for cardiogenic shock +/- levophed for hypotension   post op also with acute blood loss anemia requiring blood, and acute hypoxic respiratory failure with high oxygen requirement on vent - since resolved weaned off ventilator, currently on RM air. Pt now s/p L IABP removal, removal c/b by some bleeding from groin H & H 9/25, 1 unit of PRBC given for blood loss as  per Dr. Ackerman followed by lasix IVP, post-op course also noted for CHAVO, pt remains on primacor gtt as well as levo. 
82 year old female with a medical history of CAD, LBBB, HTN, HLD admitted to [facility] after experiencing acute onset chest pain, found to have NSTEMI, s/p cardiac cath that revealed triple vessel CAD with IABP placed, placed on Heparin gtt and loaded with Plavix.  TTE also confirmed Severe MR. Patient transferred to [facility] ICU for surgical evaluation and medical management on 11/23. IABP d/c'd 11/25 with subsequent hypotension, bradycardia, +NSTEMI, with patient urgently brought to cath lab and IABP reinserted in subsequent groin.  Pt transferred to CTICU under Dr. Ackerman service. Patient underwent CABG X 3, MVR(t), DEVONTE Clip on 11/27/22. IABP removed 11/28. Postoperative course significant for a slow wean of primacor for cardiogenic shock and levophed for hypotension, ABLA requiring blood, acute hypoxic respiratory failure (since resolved), hypervolemia (resolving with diuretics), and CHAVO due to cardiorenal syndrome (resolving).
81yo F w/ PMHx of CAD, LBBB, HTN, HLD admitted to [facility] after experiencing acute onset chest pain, found to have NSTEMI and underwent cardiac cath that revealed triple vessel CAD, placed on Heparin gtt and loaded with Plavix.  TTE also confirmed severe MR. Patient transferred to [facility] ICU for surgical evaluation and medical management on 11/23. Had intraoperative IABP that was d/c'd 11/25 pt subsequently became bradycardic and hypotensive.  Urgently brought to cath labs and IABP reinserted in subsequent groin.  Pt transferred to CTICU Dr Ackerman service.  +NSTEMI.  since s.p C3L MVR(t) DEVONTE Clip came ou on primacor, levophed - briefly on dobutamine since d.c for tachycardia - currently remains on priamcor for cardiogenic shock +/- levophed for hypotension   post op also with acute blood loss anemia requiring blood, and acute hypoxic respiratory failure with high oxygen requirement on vent - since weaned off currently on cpap   condition guarded 
82 year old female with a medical history of CAD, LBBB, HTN, HLD admitted to [facility] after experiencing acute onset chest pain, found to have NSTEMI, s/p cardiac cath that revealed triple vessel CAD with IABP placed, placed on Heparin gtt and loaded with Plavix.  TTE also confirmed Severe MR. Patient transferred to [facility] ICU for surgical evaluation and medical management on 11/23. IABP d/c'd 11/25 with subsequent hypotension, bradycardia, +NSTEMI, with patient urgently brought to cath lab and IABP reinserted in subsequent groin.  Pt transferred to CTICU under Dr. Ackerman service. Patient underwent CABG X 3, MVR(t), DEVONTE Clip on 11/27/22. IABP removed 11/28. Postoperative course significant for a slow wean of primacor for cardiogenic shock and levophed for hypotension, ABLA requiring blood, acute hypoxic respiratory failure (since resolved), hypervolemia (resolving with diuretics), and CHAVO due to cardiorenal syndrome (resolving).

## 2022-12-06 NOTE — PROGRESS NOTE ADULT - PROBLEM SELECTOR PLAN 6
Protonix for GI prophylaxis.  Heparin/SCDs for DVT prophylaxis.      Plan to be discussed with Dr. Ackerman and CTS team during AM rounds.
Scr peaked at 1.73.  Continue to trend BUN/Cr.  Nephrology consulted. CHAVO likely due to cardiorenal syndrome. Renal function continues to improve. Hemodynamics remain stable.   May continue diuresis for hypervolemia.
Protonix for GI prophylaxis.  Lovenox/SCDs for DVT prophylaxis.      Plan to be discussed with Dr. Ackerman and CTS team during AM rounds.
Scr peaked at 1.73.  Continue to trend BUN/Cr.  Renal consulted. CHAVO likely due to cardiorenal syndrome. Hemodynamics remain stable.   May continue diuresis for hypervolemia.   Urine studies sent.
Scr peaked at 1.73.  Continue to trend BUN/Cr.  Nephrology consulted. CHAVO likely due to cardiorenal syndrome. Renal function continues to improve. Hemodynamics remain stable.   May continue diuresis for hypervolemia.
Scr rising peaked at 1.73  appearing to be downtrending 1.56 this AM 12/1  Renal consulted   Urine studies sent
Scr peaked at 1.73.  Continue to trend BUN/Cr.  Nephrology consulted. CHAVO likely due to cardiorenal syndrome. Renal function continues to improve. Hemodynamics remain stable.   May continue diuresis for hypervolemia.
Scr peaked at 1.73.  Continue to trend BUN/Cr.  Nephrology consulted. CHAVO likely due to cardiorenal syndrome. Renal function continues to improve. Hemodynamics remain stable.   May continue diuresis for hypervolemia.

## 2022-12-07 ENCOUNTER — TRANSCRIPTION ENCOUNTER (OUTPATIENT)
Age: 82
End: 2022-12-07

## 2022-12-08 ENCOUNTER — INPATIENT (INPATIENT)
Facility: HOSPITAL | Age: 82
LOS: 10 days | Discharge: NURSING, REHAB OR LONG-TERM CARE | DRG: 280 | End: 2022-12-19
Attending: THORACIC SURGERY (CARDIOTHORACIC VASCULAR SURGERY) | Admitting: THORACIC SURGERY (CARDIOTHORACIC VASCULAR SURGERY)
Payer: MEDICARE

## 2022-12-08 VITALS
TEMPERATURE: 98 F | DIASTOLIC BLOOD PRESSURE: 67 MMHG | SYSTOLIC BLOOD PRESSURE: 130 MMHG | OXYGEN SATURATION: 95 % | HEART RATE: 91 BPM | RESPIRATION RATE: 29 BRPM

## 2022-12-08 DIAGNOSIS — Z90.12 ACQUIRED ABSENCE OF LEFT BREAST AND NIPPLE: Chronic | ICD-10-CM

## 2022-12-08 DIAGNOSIS — Z95.2 PRESENCE OF PROSTHETIC HEART VALVE: Chronic | ICD-10-CM

## 2022-12-08 DIAGNOSIS — I10 ESSENTIAL (PRIMARY) HYPERTENSION: ICD-10-CM

## 2022-12-08 DIAGNOSIS — I25.10 ATHEROSCLEROTIC HEART DISEASE OF NATIVE CORONARY ARTERY WITHOUT ANGINA PECTORIS: ICD-10-CM

## 2022-12-08 DIAGNOSIS — Z95.1 PRESENCE OF AORTOCORONARY BYPASS GRAFT: Chronic | ICD-10-CM

## 2022-12-08 DIAGNOSIS — Z98.890 OTHER SPECIFIED POSTPROCEDURAL STATES: ICD-10-CM

## 2022-12-08 LAB
ALBUMIN SERPL ELPH-MCNC: 3 G/DL — LOW (ref 3.3–5.2)
ALP SERPL-CCNC: 327 U/L — HIGH (ref 40–120)
ALT FLD-CCNC: 111 U/L — HIGH
ANION GAP SERPL CALC-SCNC: 12 MMOL/L — SIGNIFICANT CHANGE UP (ref 5–17)
APTT BLD: 26 SEC — LOW (ref 27.5–35.5)
AST SERPL-CCNC: 82 U/L — HIGH
BASOPHILS # BLD AUTO: 0.07 K/UL — SIGNIFICANT CHANGE UP (ref 0–0.2)
BASOPHILS NFR BLD AUTO: 0.4 % — SIGNIFICANT CHANGE UP (ref 0–2)
BILIRUB SERPL-MCNC: 1.2 MG/DL — SIGNIFICANT CHANGE UP (ref 0.4–2)
BLD GP AB SCN SERPL QL: SIGNIFICANT CHANGE UP
BUN SERPL-MCNC: 41.2 MG/DL — HIGH (ref 8–20)
CALCIUM SERPL-MCNC: 8.9 MG/DL — SIGNIFICANT CHANGE UP (ref 8.4–10.5)
CHLORIDE SERPL-SCNC: 100 MMOL/L — SIGNIFICANT CHANGE UP (ref 96–108)
CK SERPL-CCNC: 72 U/L — SIGNIFICANT CHANGE UP (ref 25–170)
CO2 SERPL-SCNC: 26 MMOL/L — SIGNIFICANT CHANGE UP (ref 22–29)
CREAT SERPL-MCNC: 1.03 MG/DL — SIGNIFICANT CHANGE UP (ref 0.5–1.3)
EGFR: 54 ML/MIN/1.73M2 — LOW
EOSINOPHIL # BLD AUTO: 0.38 K/UL — SIGNIFICANT CHANGE UP (ref 0–0.5)
EOSINOPHIL NFR BLD AUTO: 2 % — SIGNIFICANT CHANGE UP (ref 0–6)
GLUCOSE SERPL-MCNC: 105 MG/DL — HIGH (ref 70–99)
HCT VFR BLD CALC: 32.8 % — LOW (ref 34.5–45)
HGB BLD-MCNC: 11.9 G/DL — SIGNIFICANT CHANGE UP (ref 11.5–15.5)
IMM GRANULOCYTES NFR BLD AUTO: 1.9 % — HIGH (ref 0–0.9)
INR BLD: 1.28 RATIO — HIGH (ref 0.88–1.16)
LACTATE SERPL-SCNC: 1.6 MMOL/L — SIGNIFICANT CHANGE UP (ref 0.5–2)
LYMPHOCYTES # BLD AUTO: 1 K/UL — SIGNIFICANT CHANGE UP (ref 1–3.3)
LYMPHOCYTES # BLD AUTO: 5.3 % — LOW (ref 13–44)
MAGNESIUM SERPL-MCNC: 2 MG/DL — SIGNIFICANT CHANGE UP (ref 1.8–2.6)
MCHC RBC-ENTMCNC: 34.2 PG — HIGH (ref 27–34)
MCHC RBC-ENTMCNC: 36.3 GM/DL — HIGH (ref 32–36)
MCV RBC AUTO: 94.3 FL — SIGNIFICANT CHANGE UP (ref 80–100)
MONOCYTES # BLD AUTO: 0.63 K/UL — SIGNIFICANT CHANGE UP (ref 0–0.9)
MONOCYTES NFR BLD AUTO: 3.4 % — SIGNIFICANT CHANGE UP (ref 2–14)
NEUTROPHILS # BLD AUTO: 16.33 K/UL — HIGH (ref 1.8–7.4)
NEUTROPHILS NFR BLD AUTO: 87 % — HIGH (ref 43–77)
PLATELET # BLD AUTO: 121 K/UL — LOW (ref 150–400)
POTASSIUM SERPL-MCNC: 4.4 MMOL/L — SIGNIFICANT CHANGE UP (ref 3.5–5.3)
POTASSIUM SERPL-SCNC: 4.4 MMOL/L — SIGNIFICANT CHANGE UP (ref 3.5–5.3)
PROT SERPL-MCNC: 6.6 G/DL — SIGNIFICANT CHANGE UP (ref 6.6–8.7)
PROTHROM AB SERPL-ACNC: 14.9 SEC — HIGH (ref 10.5–13.4)
RBC # BLD: 3.48 M/UL — LOW (ref 3.8–5.2)
RBC # FLD: 18.2 % — HIGH (ref 10.3–14.5)
SODIUM SERPL-SCNC: 138 MMOL/L — SIGNIFICANT CHANGE UP (ref 135–145)
TROPONIN T SERPL-MCNC: 0.96 NG/ML — HIGH (ref 0–0.06)
WBC # BLD: 18.77 K/UL — HIGH (ref 3.8–10.5)
WBC # FLD AUTO: 18.77 K/UL — HIGH (ref 3.8–10.5)

## 2022-12-08 PROCEDURE — 93010 ELECTROCARDIOGRAM REPORT: CPT

## 2022-12-08 PROCEDURE — 71045 X-RAY EXAM CHEST 1 VIEW: CPT | Mod: 26

## 2022-12-08 PROCEDURE — 93306 TTE W/DOPPLER COMPLETE: CPT | Mod: 26

## 2022-12-08 PROCEDURE — 99291 CRITICAL CARE FIRST HOUR: CPT

## 2022-12-08 RX ORDER — SENNA PLUS 8.6 MG/1
2 TABLET ORAL AT BEDTIME
Refills: 0 | Status: DISCONTINUED | OUTPATIENT
Start: 2022-12-08 | End: 2022-12-19

## 2022-12-08 RX ORDER — FERROUS SULFATE 325(65) MG
325 TABLET ORAL DAILY
Refills: 0 | Status: DISCONTINUED | OUTPATIENT
Start: 2022-12-09 | End: 2022-12-19

## 2022-12-08 RX ORDER — ASPIRIN/CALCIUM CARB/MAGNESIUM 324 MG
81 TABLET ORAL DAILY
Refills: 0 | Status: DISCONTINUED | OUTPATIENT
Start: 2022-12-09 | End: 2022-12-19

## 2022-12-08 RX ORDER — FUROSEMIDE 40 MG
40 TABLET ORAL ONCE
Refills: 0 | Status: COMPLETED | OUTPATIENT
Start: 2022-12-09 | End: 2022-12-09

## 2022-12-08 RX ORDER — ACETAMINOPHEN 500 MG
650 TABLET ORAL EVERY 6 HOURS
Refills: 0 | Status: DISCONTINUED | OUTPATIENT
Start: 2022-12-08 | End: 2022-12-19

## 2022-12-08 RX ORDER — ATORVASTATIN CALCIUM 80 MG/1
20 TABLET, FILM COATED ORAL AT BEDTIME
Refills: 0 | Status: DISCONTINUED | OUTPATIENT
Start: 2022-12-08 | End: 2022-12-19

## 2022-12-08 RX ORDER — SODIUM CHLORIDE 9 MG/ML
3 INJECTION INTRAMUSCULAR; INTRAVENOUS; SUBCUTANEOUS EVERY 8 HOURS
Refills: 0 | Status: DISCONTINUED | OUTPATIENT
Start: 2022-12-08 | End: 2022-12-19

## 2022-12-08 RX ORDER — POLYETHYLENE GLYCOL 3350 17 G/17G
17 POWDER, FOR SOLUTION ORAL DAILY
Refills: 0 | Status: DISCONTINUED | OUTPATIENT
Start: 2022-12-08 | End: 2022-12-19

## 2022-12-08 RX ORDER — SERTRALINE 25 MG/1
100 TABLET, FILM COATED ORAL DAILY
Refills: 0 | Status: DISCONTINUED | OUTPATIENT
Start: 2022-12-09 | End: 2022-12-19

## 2022-12-08 RX ORDER — ASCORBIC ACID 60 MG
500 TABLET,CHEWABLE ORAL DAILY
Refills: 0 | Status: DISCONTINUED | OUTPATIENT
Start: 2022-12-09 | End: 2022-12-19

## 2022-12-08 RX ORDER — INFLUENZA VIRUS VACCINE 15; 15; 15; 15 UG/.5ML; UG/.5ML; UG/.5ML; UG/.5ML
0.7 SUSPENSION INTRAMUSCULAR ONCE
Refills: 0 | Status: COMPLETED | OUTPATIENT
Start: 2022-12-08 | End: 2022-12-08

## 2022-12-08 RX ORDER — MEGESTROL ACETATE 40 MG/ML
400 SUSPENSION ORAL DAILY
Refills: 0 | Status: DISCONTINUED | OUTPATIENT
Start: 2022-12-08 | End: 2022-12-19

## 2022-12-08 RX ORDER — PANTOPRAZOLE SODIUM 20 MG/1
40 TABLET, DELAYED RELEASE ORAL
Refills: 0 | Status: DISCONTINUED | OUTPATIENT
Start: 2022-12-08 | End: 2022-12-19

## 2022-12-08 RX ORDER — LEVOTHYROXINE SODIUM 125 MCG
112 TABLET ORAL DAILY
Refills: 0 | Status: DISCONTINUED | OUTPATIENT
Start: 2022-12-09 | End: 2022-12-19

## 2022-12-08 RX ORDER — METOPROLOL TARTRATE 50 MG
25 TABLET ORAL EVERY 12 HOURS
Refills: 0 | Status: DISCONTINUED | OUTPATIENT
Start: 2022-12-08 | End: 2022-12-09

## 2022-12-08 RX ADMIN — ATORVASTATIN CALCIUM 20 MILLIGRAM(S): 80 TABLET, FILM COATED ORAL at 21:39

## 2022-12-08 RX ADMIN — SODIUM CHLORIDE 3 MILLILITER(S): 9 INJECTION INTRAMUSCULAR; INTRAVENOUS; SUBCUTANEOUS at 22:46

## 2022-12-08 RX ADMIN — SENNA PLUS 2 TABLET(S): 8.6 TABLET ORAL at 21:39

## 2022-12-08 NOTE — H&P ADULT - NSCORESITESY/N_GEN_A_CORE_RD
[address]  [id number]  Hospital Day #8  Date of Consult:   12/21/2021        Reason for Consultation:      Consult requested by Dr. Tomasz Engel for evaluation of palliative care needs,  Goals o Patient Confused: unable to understand verbally, mother able to communicate to meet Sandeep's needs.       Functional Status: Wheel chair    Substance History     Smoking status: no  History of Substance abuse: no      Allergies: No Known Allergies    Medicatio Chemistry:  Lab Results   Component Value Date    CREATSERUM 1.13 12/21/2021    BUN 21 (H) 12/21/2021     12/21/2021    K 3.1 (L) 12/21/2021     12/21/2021    CO2 33.0 (H) 12/21/2021     (H) 12/21/2021    CA 8.8 12/21/2021    ALB 3.5 (36.6 °C) (Oral)   Resp 18   Wt 132 lb (59.9 kg)   SpO2 99%   BMI 20.07 kg/m²     General: appears comfortable in bed  Body habitus: Thin  Body mass index is 20.07 kg/m².   HEENT: Mucus membranes moist  Cardiac: Regular rate  Lungs: Clear anterior, Normal verbalized palliative care was involved about 5 years ago, she is familiar with the service. Overall it does not meet her needs. We discussed the concern regarding trajectory of chronic medical conditions and his/ their current wishes.  We discussed the circulation. DNR ( do not resuscitate) indicates if the heart stops and no spontaneous breaths end of life is present, \" allow natural death. \"    Section B:   DNAR with Full treatment includes ongoing medical management and treatment for new potential Discussed today’s visit with Gloria Johnson RN, Epic message sent Dr. Simona Bonilla, Dr. Germán Avelar and Roberto Marion. Thank you for allowing Palliative Care services to participate in the care of Mr. Rony Nicole.     A total of 50 minutes were spent on th No

## 2022-12-08 NOTE — H&P ADULT - HISTORY OF PRESENT ILLNESS
82 year old female with a medical history of CAD, MVP, LBBB, Atrial Fibrillation, HTN, HLD, Hypothyroidism, Breast CA s/p b/l mastectomy, originally admitted to [facility] after experiencing acute onset chest pain, found to have NSTEMI, s/p cardiac cath that revealed triple vessel CAD with IABP placed, placed on Heparin gtt and loaded with Plavix.  TTE also confirmed Severe MR. Patient transferred to [facility] ICU for surgical evaluation and medical management on 11/23. IABP d/c'd 11/25 with subsequent hypotension, bradycardia, +NSTEMI, with patient urgently brought to cath lab and IABP reinserted in subsequent groin.  Pt transferred to CTICU under Dr. Ackerman service. Patient underwent CABG X 3, MVR(t), DEVONTE Clip on 11/27/22. IABP removed 11/28. Postoperative course significant for a slow wean of primacor for cardiogenic shock and levophed for hypotension, ABLA requiring blood, acute hypoxic respiratory failure (since resolved), hypervolemia (resolving with diuretics), and CHAVO due to cardiorenal syndrome (resolved). Patient was discharged to [facility] on 12/6.     Now readmitted 12/8, sent from [facility] with complaints of tachycardia, SOB, and high output serosanguinous drainage from prior mediastinal CT site. Patient states she began feeling progressively weaker starting yesterday. Admits to some aching, non-radiating chest pain, mostly isolated to her lower left chest. States it is not better or worse with movement, or deep breathing. Also admits to palpitations and shortness of breath, and some dizziness which has since improved. Denies any wheezing, abd pain, nausea, vomiting, constipation, headaches, fevers, or chills.

## 2022-12-08 NOTE — H&P ADULT - NSHPPHYSICALEXAM_GEN_ALL_CORE
Appearance: Sitting upright in bed, NAD	  HEENT:   Normal oral mucosa, PERRL, EOMI	  Neck: Supple, - JVD; - Carotid Bruit   Cardiovascular: Normal S1 S2. No JVD. No murmurs.  Respiratory: Lungs clear to auscultation. No Rales, Rhonchi, Wheezing. 	  Gastrointestinal:  Soft, non-tender, + BS.	  Skin: No rashes. No ecchymoses. No cyanosis.  Extremities: +3 pitting edema of b/l lower extremities. Normal range of motion, no clubbing, cyanosis.  Vascular: Peripheral pulses palpable 2+ bilaterally.  Neurologic: Non-focal.  Psychiatry: A & O x 3, mood & affect appropriate.   Incisions: MSI c/d/i no erythema or purulence, no sternal click. Mediastinal CT site draining serosanguinous fluid, ostomy bag in place over site to catch fluid.

## 2022-12-08 NOTE — H&P ADULT - NSHPREVIEWOFSYSTEMS_GEN_ALL_CORE
CONSTITUTIONAL: Admits to fatigue, Denies fevers / chills, sweats, weight loss, weight gain  NEUROLOGICAL: No paresthesias, seizures, syncope, confusion  EYES: No blurry vision, discharge, pain, loss of vision  ENMT: No difficulty hearing, vertigo, dysphagia, epistaxis, recent dental work  CV: Admits to chest pain, palpitations  RESPIRATORY:  Admits to SOB, No wheezing, cough / sputum, hemoptysis  GI: No nausea, vomiting, diarrhea, constipation, melena  : No hematuria, dysuria, urgency, incontinence  MUSCULOSKELETAL: No arthritis, joint swelling, muscle weakness  SKIN/BREAST: No rash, itching, hair loss, masses  PSYCHIATRY: No depression, anxiety, suicidal ideation  HEMATOLOGY:  No bruising easily, enlarged lymph nodes, tender lymph nodes  ENDOCRINE: No cold intolerance, heat intolerance, polydipsia

## 2022-12-08 NOTE — PATIENT PROFILE ADULT - NAME OF PERSON WHO ASSISTED
Iris Diamond (daughter) Last cpe was September 2018 with Alexia Trevizo NP.  Pt had colposcopy with Dr Flynn Siddiqi in October 2018 and pap test due in October 2019 with cpe.  Office received refill request for valtrex 500 mg. RN called pt and pt states she does take it daily and had ran out a while ago. Pt would like a refill of 90 day supply be faxed to [facility] in [place].    Pt also called to give update. Pt had seen Alexia Trevizo NP on March 12 for hives. Pt states she has been taking Zyrtec daily as instructed and states is taking the lotion that was recommended. Pt is not tanning any longer.  Pt is concerned as the hives is still occurring in the same areas despite taking the zyrtec. Pt states medrol dose pack helped in the past and she does not want her vacation be affected in negative way if the hives should worsen or develop on her face.  Pt asked that Alexia be updated. Pt is leaving for [place] tomorrow to [place] trip. Pt does not want nor have time to see dermatology and she is hoping for medrol dose pack or other recommendations.  Message routed to Alexia Trevizo NP to review the above.   To ok refill of valtrex 500 mg with 90 tablets and 1 refill as well.

## 2022-12-08 NOTE — PATIENT PROFILE ADULT - SURGICAL SITE DESCRIPTION
midline chest incision, medial old chest tube site, b/L old groin sites from balloon, R old chest tube site

## 2022-12-08 NOTE — PATIENT PROFILE ADULT - FALL HARM RISK - FALLEN IN PAST
[] : The components of the vaccine(s) to be administered today are listed in the plan of care. The disease(s) for which the vaccine(s) are intended to prevent and the risks have been discussed with the caretaker.  The risks are also included in the appropriate vaccination information statements which have been provided to the patient's caregiver.  The caregiver has given consent to vaccinate.
No

## 2022-12-08 NOTE — PATIENT PROFILE ADULT - FALL HARM RISK - HARM RISK INTERVENTIONS
Assistance with ambulation/Assistance OOB with selected safe patient handling equipment/Communicate Risk of Fall with Harm to all staff/Discuss with provider need for PT consult/Monitor gait and stability/Orthostatic vital signs/Provide patient with walking aids - walker, cane, crutches/Reinforce activity limits and safety measures with patient and family/Tailored Fall Risk Interventions/Visual Cue: Yellow wristband and red socks/Bed in lowest position, wheels locked, appropriate side rails in place/Call bell, personal items and telephone in reach/Instruct patient to call for assistance before getting out of bed or chair/Non-slip footwear when patient is out of bed/[place] to call system/Physically safe environment - no spills, clutter or unnecessary equipment/Purposeful Proactive Rounding/Room/bathroom lighting operational, light cord in reach

## 2022-12-08 NOTE — H&P ADULT - PROBLEM SELECTOR PLAN 3
- Readmitted 12/8 with Mediastinal CT site drainage  - Ostomy bag in place over incision, placed by outside facility  - Drainage has slowed, serosanguinous in quality   - Pending CXR, Labs

## 2022-12-08 NOTE — H&P ADULT - PROBLEM SELECTOR PLAN 1
- s/p CABG X 3, MVR(t), DEVONTE Clip on 11/27/22  - Discharged to [facility] 12/6   - Readmitted 12/8 for tachycardia, SOB, CT site drainage  - Pending TTE, CXR, Labs  - Case discussed with Dr. Dye, Admit to CT ICU for further workup

## 2022-12-08 NOTE — H&P ADULT - ASSESSMENT
82 year old female with a medical history of CAD, MVP, LBBB, Atrial Fibrillation, HTN, HLD, Hypothyroidism, Breast CA s/p b/l mastectomy, originally admitted to Health system after experiencing acute onset chest pain, found to have NSTEMI, s/p cardiac cath that revealed triple vessel CAD with IABP placed, placed on Heparin gtt and loaded with Plavix.  TTE also confirmed Severe MR. Patient transferred to [facility] ICU for surgical evaluation and medical management on 11/23. IABP d/c'd 11/25 with subsequent hypotension, bradycardia, +NSTEMI, with patient urgently brought to cath lab and IABP reinserted in subsequent groin.  Pt transferred to CTICU under Dr. Ackerman service. Patient underwent CABG X 3, MVR(t), DEVONTE Clip on 11/27/22. IABP removed 11/28. Postoperative course significant for a slow wean of primacor for cardiogenic shock and levophed for hypotension, ABLA requiring blood, acute hypoxic respiratory failure (since resolved), hypervolemia (resolving with diuretics), and CHAVO due to cardiorenal syndrome (resolved). Patient was discharged to [facility] on 12/6. Now readmitted 12/8, arrived via ambulance sent from [facility] with complaints of tachycardia, SOB, and high output serosanguinous drainage from prior mediastinal CT site. Patient hemodynamically stable, oxygenating well on 2LNC, EKG with NSR, NAD.

## 2022-12-08 NOTE — PROGRESS NOTE ADULT - SUBJECTIVE AND OBJECTIVE BOX
PRAVEEN GONZALEZ  MRN-[id number]    HPI:  82 year old female with a medical history of CAD, MVP, LBBB, Atrial Fibrillation, HTN, HLD, Hypothyroidism, Breast CA s/p b/l mastectomy, originally admitted to [facility] after experiencing acute onset chest pain, found to have NSTEMI, s/p cardiac cath that revealed triple vessel CAD with IABP placed, placed on Heparin gtt and loaded with Plavix.  TTE also confirmed Severe MR. Patient transferred to [facility] ICU for surgical evaluation and medical management on 11/23. IABP d/c'd 11/25 with subsequent hypotension, bradycardia, +NSTEMI, with patient urgently brought to cath lab and IABP reinserted in subsequent groin.  Pt transferred to CTICU under Dr. Ackerman service. Patient underwent CABG X 3, MVR(t), DEVONTE Clip on 11/27/22. IABP removed 11/28. Postoperative course significant for a slow wean of primacor for cardiogenic shock and levophed for hypotension, ABLA requiring blood, acute hypoxic respiratory failure (since resolved), hypervolemia (resolving with diuretics), and CHAVO due to cardiorenal syndrome (resolved). Patient was discharged to [facility] on 12/6.   Now readmitted 12/8, sent from [facility] with complaints of tachycardia, SOB, and high output serosanguinous drainage from prior mediastinal CT site. Patient states she began feeling progressively weaker starting yesterday. Admits to some aching, non-radiating chest pain, mostly isolated to her lower left chest. States it is not better or worse with movement, or deep breathing. Also admits to palpitations and shortness of breath, and some dizziness which has since improved. Denies any wheezing, abd pain, nausea, vomiting, constipation, headaches, fevers, or chills.    (08 Dec 2022 16:56)    Surgery/Hospital Course:  ·  PRE-OP DIAGNOSIS:  CAD (coronary artery disease)   Severe mitral insufficiency   Non-ST elevation MI (NSTEMI)   ·  POST-OP DIAGNOSIS:  CAD (coronary artery disease)  Severe mitral insufficiency  NSTEMI (non-ST elevation myocardial infarction)  ·  PROCEDURES:  CABG, with saphenous vein graft 27-Nov-2022   C3L (LIMA-LAD, SVG-OM1, SVG-OM2)   Clipping, left atrial appendage ,  45 V Clip   Mitral valve replacement , Epic Plus Valve   Today:  continue  BB  left  chest US  TTE ordered    ICU Vital Signs Last 24 Hrs  T(C): --  T(F): --  HR: 91 (08 Dec 2022 17:10) (91 - 91)  BP: 130/67 (08 Dec 2022 17:10) (130/67 - 130/67)  BP(mean): 93 (08 Dec 2022 17:10) (93 - 93)  ABP: --  ABP(mean): --  RR: 29 (08 Dec 2022 17:10) (29 - 29)  SpO2: 95% (08 Dec 2022 17:10) (95% - 95%)    O2 Parameters below as of 08 Dec 2022 17:10  Patient On (Oxygen Delivery Method): nasal cannula  O2 Flow (L/min): 1          Physical Exam:  Gen: A&O   CNS: non focal 	  Neck: no JVD  RES : clear , no wheezing              CVS: Regular  rhythm. Normal S1/S2  Abd: Soft, non-distended. Bowel sounds present.  Skin: No rash.  Ext:  no edema    ============================I/O===========================   I&O's Detail    ============================ LABS =========================                        11.9   18.77 )-----------( x        ( 08 Dec 2022 17:35 )             32.8                   ======================Micro/Rad/Cardio=================  Culture: Reviewed   CXR: Reviewed  Echo:Reviewed  ======================================================  PAST MEDICAL & SURGICAL HISTORY:  Hypertension      Hyperlipidemia      Hypothyroid      Breast cancer      History of mitral valve prolapse      S/P mastectomy, left      S/P CABG x 3      S/P MVR (mitral valve replacement)        ====================ASSESSMENT ==============  82 year old female with a medical history of CAD, MVP, LBBB, Atrial Fibrillation, HTN, HLD, Hypothyroidism, Breast CA s/p b/l mastectomy, originally admitted to [facility] after experiencing acute onset chest pain, found to have NSTEMI, s/p cardiac cath that revealed triple vessel CAD with IABP placed, placed on Heparin gtt and loaded with Plavix.  TTE also confirmed Severe MR. Patient transferred to [facility] ICU for surgical evaluation and medical management on 11/23. IABP d/c'd 11/25 with subsequent hypotension, bradycardia, +NSTEMI, with patient urgently brought to cath lab and IABP reinserted in subsequent groin.  Pt transferred to CTICU under Dr. Ackerman service. Patient underwent CABG X 3, MVR(t), DEVONTE Clip on 11/27/22. IABP removed 11/28. Postoperative course significant for a slow wean of primacor for cardiogenic shock and levophed for hypotension, ABLA requiring blood, acute hypoxic respiratory failure (since resolved), hypervolemia (resolving with diuretics), and CHAVO due to cardiorenal syndrome (resolved). Patient was discharged to [facility] on 12/6. Now readmitted 12/8, arrived via ambulance sent from [facility] with complaints of tachycardia, SOB, and high output serosanguinous drainage from prior mediastinal CT site. Patient hemodynamically stable, oxygenating well on 2LNC, EKG with NSR, NAD.     ---CAD (coronary artery disease).   ---- s/p CABG X 3, MVR(t), DEVONTE Clip on 11/27/22  --- Discharged to [facility] 12/6   --- Readmitted 12/8 for tachycardia, SOB, CT site drainage  --- HTN (hypertension).       Plan:  -start Metoprolol 25mg q12h. tomorrow  - Ostomy bag in place over incision, placed by outside facility  - Pending CXR, Labs.-    ====================== NEUROLOGY=====================  acetaminophen     Tablet .. 650 milliGRAM(s) Oral every 6 hours PRN Mild Pain (1 - 3)    ==================== RESPIRATORY======================      ====================CARDIOVASCULAR==================  metoprolol tartrate 25 milliGRAM(s) Oral every 12 hours    ===================HEMATOLOGIC/ONC ===================  Monitor H&H/Plts      ===================== RENAL =========================  Continue monitoring urine output, I&OS, BUN/Cr     ==================== GASTROINTESTINAL===================  pantoprazole    Tablet 40 milliGRAM(s) Oral before breakfast  polyethylene glycol 3350 17 Gram(s) Oral daily  senna 2 Tablet(s) Oral at bedtime  sodium chloride 0.9% lock flush 3 milliLiter(s) IV Push every 8 hours    =======================    ENDOCRINE  =====================  atorvastatin 20 milliGRAM(s) Oral at bedtime  megestrol Suspension 400 milliGRAM(s) Oral daily    ========================INFECTIOUS DISEASE================      -Close hemodynamic , ventilatory and drain monitoring and management per post op routine .  -Monitor Neurologic status ,   -Head of the bed should remain elevated to 45 degrees,  -Monitor adequacy of oxygenation and ventilation and attempt to wean oxygen ,  -Monitor for arrhythmias and monitor parameters for organ perfusion,  -Glycemic control is satisfactory,  -Nutritional goals will be met using po eventually , insure adequate caloric intake and monitor the same ,  -Electrolytes have been repleted as necessary , pain control has been achieved  and wound care has been carried out ,  -Stress ulcer and VTE prophylaxis will be achieved,  -Agressive PT and early mobility and ambulation goals will be met,  -The family was updated about the course and plan .      I have spent 35 minutes providing acute care for this critically ill patient     Patient requires continuous monitoring with bedside rhythm monitoring, pulse ox monitoring, and intermittent blood gas analysis. Care plan discussed with ICU care team. Patient remained critical and at risk for life threatening decompensation.

## 2022-12-08 NOTE — H&P ADULT - NSICDXPASTSURGICALHX_GEN_ALL_CORE_FT
PAST SURGICAL HISTORY:  S/P CABG x 3     S/P mastectomy, left     S/P MVR (mitral valve replacement)

## 2022-12-09 DIAGNOSIS — I50.23 ACUTE ON CHRONIC SYSTOLIC (CONGESTIVE) HEART FAILURE: ICD-10-CM

## 2022-12-09 DIAGNOSIS — J90 PLEURAL EFFUSION, NOT ELSEWHERE CLASSIFIED: ICD-10-CM

## 2022-12-09 DIAGNOSIS — I31.39 OTHER PERICARDIAL EFFUSION (NONINFLAMMATORY): ICD-10-CM

## 2022-12-09 DIAGNOSIS — I48.91 UNSPECIFIED ATRIAL FIBRILLATION: ICD-10-CM

## 2022-12-09 DIAGNOSIS — I34.0 NONRHEUMATIC MITRAL (VALVE) INSUFFICIENCY: ICD-10-CM

## 2022-12-09 DIAGNOSIS — E03.9 HYPOTHYROIDISM, UNSPECIFIED: ICD-10-CM

## 2022-12-09 LAB
ALBUMIN SERPL ELPH-MCNC: 2.7 G/DL — LOW (ref 3.3–5.2)
ALP SERPL-CCNC: 265 U/L — HIGH (ref 40–120)
ALT FLD-CCNC: 89 U/L — HIGH
ANION GAP SERPL CALC-SCNC: 12 MMOL/L — SIGNIFICANT CHANGE UP (ref 5–17)
APPEARANCE UR: ABNORMAL
AST SERPL-CCNC: 66 U/L — HIGH
BACTERIA # UR AUTO: ABNORMAL
BILIRUB DIRECT SERPL-MCNC: 0.5 MG/DL — HIGH (ref 0–0.3)
BILIRUB INDIRECT FLD-MCNC: 0.8 MG/DL — SIGNIFICANT CHANGE UP (ref 0.2–1)
BILIRUB SERPL-MCNC: 1.3 MG/DL — SIGNIFICANT CHANGE UP (ref 0.4–2)
BILIRUB UR-MCNC: NEGATIVE — SIGNIFICANT CHANGE UP
BUN SERPL-MCNC: 39.3 MG/DL — HIGH (ref 8–20)
CALCIUM SERPL-MCNC: 8.6 MG/DL — SIGNIFICANT CHANGE UP (ref 8.4–10.5)
CHLORIDE SERPL-SCNC: 102 MMOL/L — SIGNIFICANT CHANGE UP (ref 96–108)
CO2 SERPL-SCNC: 25 MMOL/L — SIGNIFICANT CHANGE UP (ref 22–29)
COLOR SPEC: YELLOW — SIGNIFICANT CHANGE UP
CREAT SERPL-MCNC: 0.97 MG/DL — SIGNIFICANT CHANGE UP (ref 0.5–1.3)
DIFF PNL FLD: ABNORMAL
EGFR: 58 ML/MIN/1.73M2 — LOW
EPI CELLS # UR: SIGNIFICANT CHANGE UP
GLUCOSE SERPL-MCNC: 92 MG/DL — SIGNIFICANT CHANGE UP (ref 70–99)
GLUCOSE UR QL: NEGATIVE MG/DL — SIGNIFICANT CHANGE UP
HCT VFR BLD CALC: 30.8 % — LOW (ref 34.5–45)
HGB BLD-MCNC: 10 G/DL — LOW (ref 11.5–15.5)
KETONES UR-MCNC: NEGATIVE — SIGNIFICANT CHANGE UP
LEUKOCYTE ESTERASE UR-ACNC: ABNORMAL
MAGNESIUM SERPL-MCNC: 1.9 MG/DL — SIGNIFICANT CHANGE UP (ref 1.8–2.6)
MCHC RBC-ENTMCNC: 30.6 PG — SIGNIFICANT CHANGE UP (ref 27–34)
MCHC RBC-ENTMCNC: 32.5 GM/DL — SIGNIFICANT CHANGE UP (ref 32–36)
MCV RBC AUTO: 94.2 FL — SIGNIFICANT CHANGE UP (ref 80–100)
NITRITE UR-MCNC: NEGATIVE — SIGNIFICANT CHANGE UP
NT-PROBNP SERPL-SCNC: HIGH PG/ML (ref 0–300)
PH UR: 5 — SIGNIFICANT CHANGE UP (ref 5–8)
PLATELET # BLD AUTO: 125 K/UL — LOW (ref 150–400)
POTASSIUM SERPL-MCNC: 4.3 MMOL/L — SIGNIFICANT CHANGE UP (ref 3.5–5.3)
POTASSIUM SERPL-SCNC: 4.3 MMOL/L — SIGNIFICANT CHANGE UP (ref 3.5–5.3)
PROT SERPL-MCNC: 6 G/DL — LOW (ref 6.6–8.7)
PROT UR-MCNC: NEGATIVE — SIGNIFICANT CHANGE UP
RBC # BLD: 3.27 M/UL — LOW (ref 3.8–5.2)
RBC # FLD: 18.3 % — HIGH (ref 10.3–14.5)
RBC CASTS # UR COMP ASSIST: ABNORMAL /HPF (ref 0–4)
SODIUM SERPL-SCNC: 138 MMOL/L — SIGNIFICANT CHANGE UP (ref 135–145)
SP GR SPEC: 1.01 — SIGNIFICANT CHANGE UP (ref 1.01–1.02)
UROBILINOGEN FLD QL: NEGATIVE MG/DL — SIGNIFICANT CHANGE UP
WBC # BLD: 16.97 K/UL — HIGH (ref 3.8–10.5)
WBC # FLD AUTO: 16.97 K/UL — HIGH (ref 3.8–10.5)
WBC UR QL: ABNORMAL /HPF (ref 0–5)

## 2022-12-09 PROCEDURE — 99292 CRITICAL CARE ADDL 30 MIN: CPT | Mod: 25

## 2022-12-09 PROCEDURE — 71045 X-RAY EXAM CHEST 1 VIEW: CPT | Mod: 26,76

## 2022-12-09 PROCEDURE — 99291 CRITICAL CARE FIRST HOUR: CPT | Mod: 24

## 2022-12-09 RX ORDER — OXYCODONE HYDROCHLORIDE 5 MG/1
5 TABLET ORAL EVERY 4 HOURS
Refills: 0 | Status: DISCONTINUED | OUTPATIENT
Start: 2022-12-09 | End: 2022-12-13

## 2022-12-09 RX ORDER — MAGNESIUM SULFATE 500 MG/ML
2 VIAL (ML) INJECTION ONCE
Refills: 0 | Status: COMPLETED | OUTPATIENT
Start: 2022-12-09 | End: 2022-12-09

## 2022-12-09 RX ORDER — ACETAMINOPHEN 500 MG
1000 TABLET ORAL ONCE
Refills: 0 | Status: COMPLETED | OUTPATIENT
Start: 2022-12-09 | End: 2022-12-09

## 2022-12-09 RX ORDER — METOPROLOL TARTRATE 50 MG
50 TABLET ORAL
Refills: 0 | Status: DISCONTINUED | OUTPATIENT
Start: 2022-12-09 | End: 2022-12-19

## 2022-12-09 RX ORDER — METOPROLOL TARTRATE 50 MG
25 TABLET ORAL ONCE
Refills: 0 | Status: COMPLETED | OUTPATIENT
Start: 2022-12-09 | End: 2022-12-09

## 2022-12-09 RX ORDER — METOPROLOL TARTRATE 50 MG
5 TABLET ORAL ONCE
Refills: 0 | Status: COMPLETED | OUTPATIENT
Start: 2022-12-09 | End: 2022-12-09

## 2022-12-09 RX ORDER — FUROSEMIDE 40 MG
40 TABLET ORAL DAILY
Refills: 0 | Status: DISCONTINUED | OUTPATIENT
Start: 2022-12-10 | End: 2022-12-10

## 2022-12-09 RX ORDER — METOPROLOL TARTRATE 50 MG
5 TABLET ORAL ONCE
Refills: 0 | Status: DISCONTINUED | OUTPATIENT
Start: 2022-12-09 | End: 2022-12-09

## 2022-12-09 RX ORDER — LIDOCAINE 4 G/100G
1 CREAM TOPICAL DAILY
Refills: 0 | Status: DISCONTINUED | OUTPATIENT
Start: 2022-12-09 | End: 2022-12-19

## 2022-12-09 RX ORDER — APIXABAN 2.5 MG/1
2.5 TABLET, FILM COATED ORAL
Refills: 0 | Status: DISCONTINUED | OUTPATIENT
Start: 2022-12-09 | End: 2022-12-19

## 2022-12-09 RX ADMIN — POLYETHYLENE GLYCOL 3350 17 GRAM(S): 17 POWDER, FOR SOLUTION ORAL at 12:36

## 2022-12-09 RX ADMIN — Medication 500 MILLIGRAM(S): at 12:35

## 2022-12-09 RX ADMIN — Medication 25 MILLIGRAM(S): at 05:53

## 2022-12-09 RX ADMIN — SODIUM CHLORIDE 3 MILLILITER(S): 9 INJECTION INTRAMUSCULAR; INTRAVENOUS; SUBCUTANEOUS at 06:30

## 2022-12-09 RX ADMIN — Medication 400 MILLIGRAM(S): at 03:00

## 2022-12-09 RX ADMIN — LIDOCAINE 1 PATCH: 4 CREAM TOPICAL at 20:46

## 2022-12-09 RX ADMIN — Medication 40 MILLIGRAM(S): at 04:04

## 2022-12-09 RX ADMIN — Medication 1000 MILLIGRAM(S): at 11:30

## 2022-12-09 RX ADMIN — SENNA PLUS 2 TABLET(S): 8.6 TABLET ORAL at 22:17

## 2022-12-09 RX ADMIN — SERTRALINE 100 MILLIGRAM(S): 25 TABLET, FILM COATED ORAL at 12:35

## 2022-12-09 RX ADMIN — Medication 81 MILLIGRAM(S): at 12:35

## 2022-12-09 RX ADMIN — MEGESTROL ACETATE 400 MILLIGRAM(S): 40 SUSPENSION ORAL at 12:36

## 2022-12-09 RX ADMIN — PANTOPRAZOLE SODIUM 40 MILLIGRAM(S): 20 TABLET, DELAYED RELEASE ORAL at 05:53

## 2022-12-09 RX ADMIN — Medication 1000 MILLIGRAM(S): at 03:34

## 2022-12-09 RX ADMIN — Medication 400 MILLIGRAM(S): at 17:24

## 2022-12-09 RX ADMIN — OXYCODONE HYDROCHLORIDE 5 MILLIGRAM(S): 5 TABLET ORAL at 15:14

## 2022-12-09 RX ADMIN — OXYCODONE HYDROCHLORIDE 5 MILLIGRAM(S): 5 TABLET ORAL at 04:10

## 2022-12-09 RX ADMIN — Medication 1000 MILLIGRAM(S): at 17:39

## 2022-12-09 RX ADMIN — Medication 25 GRAM(S): at 05:00

## 2022-12-09 RX ADMIN — Medication 112 MICROGRAM(S): at 05:52

## 2022-12-09 RX ADMIN — SODIUM CHLORIDE 3 MILLILITER(S): 9 INJECTION INTRAMUSCULAR; INTRAVENOUS; SUBCUTANEOUS at 12:22

## 2022-12-09 RX ADMIN — Medication 25 MILLIGRAM(S): at 07:02

## 2022-12-09 RX ADMIN — OXYCODONE HYDROCHLORIDE 5 MILLIGRAM(S): 5 TABLET ORAL at 16:14

## 2022-12-09 RX ADMIN — Medication 325 MILLIGRAM(S): at 12:35

## 2022-12-09 RX ADMIN — ATORVASTATIN CALCIUM 20 MILLIGRAM(S): 80 TABLET, FILM COATED ORAL at 22:17

## 2022-12-09 RX ADMIN — SODIUM CHLORIDE 3 MILLILITER(S): 9 INJECTION INTRAMUSCULAR; INTRAVENOUS; SUBCUTANEOUS at 22:13

## 2022-12-09 RX ADMIN — OXYCODONE HYDROCHLORIDE 5 MILLIGRAM(S): 5 TABLET ORAL at 04:50

## 2022-12-09 RX ADMIN — Medication 25 GRAM(S): at 04:04

## 2022-12-09 RX ADMIN — Medication 5 MILLIGRAM(S): at 06:04

## 2022-12-09 RX ADMIN — LIDOCAINE 1 PATCH: 4 CREAM TOPICAL at 17:24

## 2022-12-09 RX ADMIN — Medication 400 MILLIGRAM(S): at 11:00

## 2022-12-09 RX ADMIN — Medication 50 MILLIGRAM(S): at 17:24

## 2022-12-09 NOTE — PROGRESS NOTE ADULT - SUBJECTIVE AND OBJECTIVE BOX
PRAVEEN GONZALEZ  MRN-[id number]    HPI:  82 year old female with a medical history of CAD, MVP, LBBB, Atrial Fibrillation, HTN, HLD, Hypothyroidism, Breast CA s/p b/l mastectomy, originally admitted to [facility] after experiencing acute onset chest pain, found to have NSTEMI, s/p cardiac cath that revealed triple vessel CAD with IABP placed, placed on Heparin gtt and loaded with Plavix.  TTE also confirmed Severe MR. Patient transferred to [facility] ICU for surgical evaluation and medical management on . IABP d/c'd  with subsequent hypotension, bradycardia, +NSTEMI, with patient urgently brought to cath lab and IABP reinserted in subsequent groin.  Pt transferred to CTICU under Dr. Ackerman service. Patient underwent CABG X 3, MVR(t), DEVONTE Clip on 22. IABP removed . Postoperative course significant for a slow wean of primacor for cardiogenic shock and levophed for hypotension, ABLA requiring blood, acute hypoxic respiratory failure (since resolved), hypervolemia (resolving with diuretics), and CHAVO due to cardiorenal syndrome (resolved). Patient was discharged to [facility] on .   Now readmitted , sent from [facility] with complaints of tachycardia, SOB, and high output serosanguinous drainage from prior mediastinal CT site. Patient states she began feeling progressively weaker starting yesterday. Admits to some aching, non-radiating chest pain, mostly isolated to her lower left chest. States it is not better or worse with movement, or deep breathing. Also admits to palpitations and shortness of breath, and some dizziness which has since improved. Denies any wheezing, abd pain, nausea, vomiting, constipation, headaches, fevers, or chills.    (08 Dec 2022 16:56)    Surgery/Hospital Course:  ·  PRE-OP DIAGNOSIS:  CAD (coronary artery disease)   Severe mitral insufficiency   Non-ST elevation MI (NSTEMI)   ·  POST-OP DIAGNOSIS:  CAD (coronary artery disease)  Severe mitral insufficiency  NSTEMI (non-ST elevation myocardial infarction)  ·  PROCEDURES:  CABG, with saphenous vein graft 2022   C3L (LIMA-LAD, SVG-OM1, SVG-OM2)   Clipping, left atrial appendage ,  45 V Clip   Mitral valve replacement , Epic Plus Valve   Today:  continue  BB  left  chest US guided pigtail insertion  TTE yesterday :    1. Left ventricular ejection fraction, by visual estimation, is 30 to   35%.   2. Entire apex, mid and apical anterior wall, mid andapical inferior   septum, and mid and apical inferior wall are abnormal as described above.   3. Small right ventricle.   4. Moderately enlarged left atrium.   5. Normal right atrial size.   6. Large pleural effusion in both left and right lateral regions.   7. Trivial pericardial effusion.   8. Moderate mitral annular calcification.   9. Trace mitral valve regurgitation.      ICU Vital Signs Last 24 Hrs  T(C): 36.7 (09 Dec 2022 08:00), Max: 36.9 (08 Dec 2022 20:00)  T(F): 98 (09 Dec 2022 08:00), Max: 98.4 (08 Dec 2022 20:00)  HR: 80 (09 Dec 2022 12:00) (75 - 138)  BP: 123/59 (09 Dec 2022 12:00) (96/70 - 151/76)  BP(mean): 85 (09 Dec 2022 12:00) (80 - 116)  ABP: --  ABP(mean): --  RR: 20 (09 Dec 2022 12:00) (12 - 29)  SpO2: 99% (09 Dec 2022 12:00) (95% - 100%)    O2 Parameters below as of 09 Dec 2022 11:00  Patient On (Oxygen Delivery Method): nasal cannula  O2 Flow (L/min): 2          Physical Exam:  Gen: A&O   CNS: non focal 	  Neck: no JVD  RES : clear , no wheezing              CVS: Regular  rhythm. Normal S1/S2  Abd: Soft, non-distended. Bowel sounds present.  Skin: No rash.  Ext:  no edema    ============================I/O===========================   I&O's Detail    08 Dec 2022 07:01  -  09 Dec 2022 07:00  --------------------------------------------------------  IN:    Oral Fluid: 60 mL  Total IN: 60 mL    OUT:    Voided (mL): 550 mL  Total OUT: 550 mL    Total NET: -490 mL      09 Dec 2022 07:01  -  09 Dec 2022 12:17  --------------------------------------------------------  IN:  Total IN: 0 mL    OUT:    Chest Tube (mL): 400 mL    Voided (mL): 300 mL  Total OUT: 700 mL    Total NET: -700 mL        ============================ LABS =========================                        10.0   16.97 )-----------( 125      ( 09 Dec 2022 02:55 )             30.8         138  |  102  |  39.3<H>  ----------------------------<  92  4.3   |  25.0  |  0.97    Ca    8.6      09 Dec 2022 02:55  Mg     1.9         TPro  6.0<L>  /  Alb  2.7<L>  /  TBili  1.3  /  DBili  0.5<H>  /  AST  66<H>  /  ALT  89<H>  /  AlkPhos  265<H>      LIVER FUNCTIONS - ( 09 Dec 2022 02:55 )  Alb: 2.7 g/dL / Pro: 6.0 g/dL / ALK PHOS: 265 U/L / ALT: 89 U/L / AST: 66 U/L / GGT: x           PT/INR - ( 08 Dec 2022 17:35 )   PT: 14.9 sec;   INR: 1.28 ratio         PTT - ( 08 Dec 2022 17:35 )  PTT:26.0 sec    Urinalysis Basic - ( 09 Dec 2022 09:20 )    Color: Yellow / Appearance: very cloudy / S.015 / pH: x  Gluc: x / Ketone: Negative  / Bili: Negative / Urobili: Negative mg/dL   Blood: x / Protein: Negative / Nitrite: Negative   Leuk Esterase: Small / RBC: 3-5 /HPF / WBC 6-10 /HPF   Sq Epi: x / Non Sq Epi: Few / Bacteria: Many      ======================Micro/Rad/Cardio=================  Culture: Reviewed   CXR: Reviewed  Echo:Reviewed  ======================================================  PAST MEDICAL & SURGICAL HISTORY:  Hypertension      Hyperlipidemia      Hypothyroid      Breast cancer      History of mitral valve prolapse      S/P mastectomy, left      S/P CABG x 3      S/P MVR (mitral valve replacement)        ====================ASSESSMENT ==============  82F PMH of CAD, MVP, LBBB, Atrial Fibrillation, HTN, HLD, Hypothyroidism, Breast CA s/p b/l mastectomy, originally admitted to [facility] after experiencing acute onset chest pain, found to have NSTEMI, s/p cardiac cath that revealed triple vessel CAD with IABP placed and TTE showed severe MR. Patient transferred to [facility] ICU for surgical evaluation and medical management on . Patient underwent CABG X 3, MVR(t), DEVONTE Clip on 22. IABP removed . Postoperative course significant for cardiogenic shock requiring Primacor, ABLA, CHAVO. Patient was discharged to [facility] on . Now readmitted  with tachycardia, SOB, and high output serosanguinous drainage from prior mediastinal CT site.       --- Pericardial effusion.   ---Readmitted  for tachycardia, SOB, mediastinal chest tube site drainage  --- CAD (coronary artery disease).   --- s/p CABG X 3, MVR(t), DEVONTE Clip on 22  --- Mitral insufficiency.  --- Acute on chronic systolic congestive heart failure.  --- Bilateral pleural effusion.  --- HTN (hypertension).  --- Atrial fibrillation.  ---Hypothyroidism.     Plan:  -Continue ASA, Lopressor, Statin.  -Lasix resumed  -Resume Eliquis   -Continue Lopressor for HR / BP control.  - Resume home dose synthroid.-  ====================== NEUROLOGY=====================  acetaminophen     Tablet .. 650 milliGRAM(s) Oral every 6 hours PRN Mild Pain (1 - 3)  acetaminophen   IVPB .. 1000 milliGRAM(s) IV Intermittent once  oxyCODONE    IR 5 milliGRAM(s) Oral every 4 hours PRN Severe Pain (7 - 10)  sertraline 100 milliGRAM(s) Oral daily    ==================== RESPIRATORY======================    ====================CARDIOVASCULAR==================  metoprolol tartrate 50 milliGRAM(s) Oral two times a day    ===================HEMATOLOGIC/ONC ===================  Monitor H&H/Plts    aspirin enteric coated 81 milliGRAM(s) Oral daily    ===================== RENAL =========================  Continue monitoring urine output, I&OS, BUN/Cr     ==================== GASTROINTESTINAL===================  ascorbic acid 500 milliGRAM(s) Oral daily  ferrous    sulfate 325 milliGRAM(s) Oral daily  pantoprazole    Tablet 40 milliGRAM(s) Oral before breakfast  polyethylene glycol 3350 17 Gram(s) Oral daily  senna 2 Tablet(s) Oral at bedtime  sodium chloride 0.9% lock flush 3 milliLiter(s) IV Push every 8 hours    =======================    ENDOCRINE  =====================  atorvastatin 20 milliGRAM(s) Oral at bedtime  levothyroxine 112 MICROGram(s) Oral daily  megestrol Suspension 400 milliGRAM(s) Oral daily    ========================INFECTIOUS DISEASE================      -Close hemodynamic , ventilatory and drain monitoring and management per post op routine .  -Monitor Neurologic status ,   -Head of the bed should remain elevated to 45 degrees,  -Monitor adequacy of oxygenation and ventilation and attempt to wean oxygen ,  -Monitor for arrhythmias and monitor parameters for organ perfusion,  -Glycemic control is satisfactory,  -Nutritional goals will be met using po eventually , insure adequate caloric intake and monitor the same ,  -Electrolytes have been repleted as necessary , pain control has been achieved  and wound care has been carried out ,  -Stress ulcer and VTE prophylaxis will be achieved,  -Agressive PT and early mobility and ambulation goals will be met,  -The family was updated about the course and plan .      I have spent 35 minutes providing acute care for this critically ill patient     Patient requires continuous monitoring with bedside rhythm monitoring, pulse ox monitoring, and intermittent blood gas analysis. Care plan discussed with ICU care team. Patient remained critical and at risk for life threatening decompensation.

## 2022-12-09 NOTE — PROGRESS NOTE ADULT - PROBLEM SELECTOR PLAN 3
TTE 12/5 EF 35-40%  TTE 12/8 EF 30-35%  BNP pending with AM labs  Discuss starting GDMT including ACE/ARB TTE 12/5 EF 35-40%  TTE 12/8 EF 30-35%  BNP pending with AM labs  Lasix resumed  Discuss starting GDMT including ACE/ARB Standing diuretic  Resume Eliquis after pigtail insertion

## 2022-12-09 NOTE — PROGRESS NOTE ADULT - PROBLEM SELECTOR PLAN 4
Bilateral pleural effusions  Recurrent Right Pleural Effusion  Will discuss pigtail insertion after rounds - will be 24 hours off Eliquis at that time  Diuresis resumed TTE 12/5 EF 35-40%  TTE 12/8 EF 30-35%  BNP pending with AM labs  Lasix resumed  Discuss starting GDMT including ACE/ARB

## 2022-12-09 NOTE — PROGRESS NOTE ADULT - PROBLEM SELECTOR PLAN 1
Readmitted 12/8 for tachycardia, SOB, mediastinal chest tube site drainage  Reportedly, 1L serosang drainage from mediastinal chest tube site with ostomy bag placed over site  Suspect pericardial effusion  TTE on arrival showed EF 30-35%, trivial pericardial effusion   Appears to be resolved  HD stable

## 2022-12-09 NOTE — PROGRESS NOTE ADULT - SUBJECTIVE AND OBJECTIVE BOX
Brief summary:  82F PMH of CAD, MVP, LBBB, Atrial Fibrillation, HTN, HLD, Hypothyroidism, Breast CA s/p b/l mastectomy, originally admitted to [facility] after experiencing acute onset chest pain, found to have NSTEMI, s/p cardiac cath that revealed triple vessel CAD with IABP placed and TTE showed severe MR. Patient transferred to [facility] ICU for surgical evaluation and medical management on 11/23. Patient underwent CABG X 3, MVR(t), DEVONTE Clip on 11/27/22. IABP removed 11/28. Postoperative course significant for cardiogenic shock requiring Primacor, ABLA, CHAVO. Patient was discharged to [facility] on 12/6. Now readmitted 12/8 with tachycardia, SOB, and high output serosanguinous drainage from prior mediastinal CT site.     Overnight events:  TTE revealed trivial pericardial effusion. Has b/l pleural effusions with SpO2 > 95% on 2L NC. Diuretic resumed.  Patient denies acute pain with radiating or aggravating factors.  She denies chest pain, shortness of breath, palpitations, headache, dizziness, nausea, or vomiting.     PAST MEDICAL & SURGICAL HISTORY:  Hypertension      Hyperlipidemia      Hypothyroid      Breast cancer      History of mitral valve prolapse      S/P mastectomy, left      S/P CABG x 3      S/P MVR (mitral valve replacement)          Medications:  acetaminophen     Tablet .. 650 milliGRAM(s) Oral every 6 hours PRN  acetaminophen   IVPB .. 1000 milliGRAM(s) IV Intermittent once  ascorbic acid 500 milliGRAM(s) Oral daily  aspirin enteric coated 81 milliGRAM(s) Oral daily  atorvastatin 20 milliGRAM(s) Oral at bedtime  ferrous    sulfate 325 milliGRAM(s) Oral daily  furosemide   Injectable 40 milliGRAM(s) IV Push once  influenza  Vaccine (HIGH DOSE) 0.7 milliLiter(s) IntraMuscular once  levothyroxine 112 MICROGram(s) Oral daily  megestrol Suspension 400 milliGRAM(s) Oral daily  metoprolol tartrate 25 milliGRAM(s) Oral every 12 hours  pantoprazole    Tablet 40 milliGRAM(s) Oral before breakfast  polyethylene glycol 3350 17 Gram(s) Oral daily  senna 2 Tablet(s) Oral at bedtime  sertraline 100 milliGRAM(s) Oral daily  sodium chloride 0.9% lock flush 3 milliLiter(s) IV Push every 8 hours      MEDICATIONS  (PRN):  acetaminophen     Tablet .. 650 milliGRAM(s) Oral every 6 hours PRN Mild Pain (1 - 3)      Daily Review:      Weight (kg): 72.4 (12-08 @ 22:00)                          11.9   18.77 )-----------( 121      ( 08 Dec 2022 17:35 )             32.8   12-08    138  |  100  |  41.2<H>  ----------------------------<  105<H>  4.4   |  26.0  |  1.03    Ca    8.9      08 Dec 2022 17:35  Mg     2.0     12-08    TPro  6.6  /  Alb  3.0<L>  /  TBili  1.2  /  DBili  x   /  AST  82<H>  /  ALT  111<H>  /  AlkPhos  327<H>  12-08    CARDIAC MARKERS ( 08 Dec 2022 17:35 )  x     / 0.96 ng/mL / 72 U/L / x     / x        PT/INR - ( 08 Dec 2022 17:35 )   PT: 14.9 sec;   INR: 1.28 ratio         PTT - ( 08 Dec 2022 17:35 )  PTT:26.0 sec    T(C): 36.8 (12-09-22 @ 00:00), Max: 36.9 (12-08-22 @ 20:00)  HR: 93 (12-09-22 @ 00:00) (87 - 93)  BP: 134/63 (12-09-22 @ 00:00) (124/65 - 147/64)  RR: 16 (12-09-22 @ 00:00) (13 - 29)  SpO2: 97% (12-09-22 @ 00:00) (95% - 100%)  Wt(kg): --    CAPILLARY BLOOD GLUCOSE      I&O's Summary    08 Dec 2022 07:01  -  09 Dec 2022 01:05  --------------------------------------------------------  IN: 0 mL / OUT: 250 mL / NET: -250 mL        Physical Exam  General: Well appearing, NAD  Neuro: AxO x3, non-focal, MOSHER  Cardiac: S1S2, no murmurs  Pulm: diminished b/l lung bases, no wheezing or rales  Abdomen: Soft, NT, ND  Peripheral: +DP pulses b/l, no peripheral edema

## 2022-12-09 NOTE — PROGRESS NOTE ADULT - PROBLEM SELECTOR PLAN 7
Resume home dose synthroid Resume Eliquis after pigtail catheter insertion  Continue Lopressor for HR / BP control

## 2022-12-09 NOTE — PROGRESS NOTE ADULT - PROBLEM SELECTOR PLAN 5
Lopressor resumed Bilateral pleural effusions  Recurrent Right Pleural Effusion  Will discuss pigtail insertion after rounds - will be 24 hours off Eliquis at that time  Diuresis resumed

## 2022-12-10 LAB
ANION GAP SERPL CALC-SCNC: 9 MMOL/L — SIGNIFICANT CHANGE UP (ref 5–17)
BUN SERPL-MCNC: 40.3 MG/DL — HIGH (ref 8–20)
CALCIUM SERPL-MCNC: 8.1 MG/DL — LOW (ref 8.4–10.5)
CHLORIDE SERPL-SCNC: 100 MMOL/L — SIGNIFICANT CHANGE UP (ref 96–108)
CO2 SERPL-SCNC: 27 MMOL/L — SIGNIFICANT CHANGE UP (ref 22–29)
CREAT SERPL-MCNC: 1.12 MG/DL — SIGNIFICANT CHANGE UP (ref 0.5–1.3)
EGFR: 49 ML/MIN/1.73M2 — LOW
GLUCOSE SERPL-MCNC: 129 MG/DL — HIGH (ref 70–99)
HCT VFR BLD CALC: 28.3 % — LOW (ref 34.5–45)
HGB BLD-MCNC: 9.1 G/DL — LOW (ref 11.5–15.5)
MAGNESIUM SERPL-MCNC: 2.5 MG/DL — SIGNIFICANT CHANGE UP (ref 1.6–2.6)
MCHC RBC-ENTMCNC: 30.3 PG — SIGNIFICANT CHANGE UP (ref 27–34)
MCHC RBC-ENTMCNC: 32.2 GM/DL — SIGNIFICANT CHANGE UP (ref 32–36)
MCV RBC AUTO: 94.3 FL — SIGNIFICANT CHANGE UP (ref 80–100)
PLATELET # BLD AUTO: 132 K/UL — LOW (ref 150–400)
POTASSIUM SERPL-MCNC: 3.8 MMOL/L — SIGNIFICANT CHANGE UP (ref 3.5–5.3)
POTASSIUM SERPL-SCNC: 3.8 MMOL/L — SIGNIFICANT CHANGE UP (ref 3.5–5.3)
RBC # BLD: 3 M/UL — LOW (ref 3.8–5.2)
RBC # FLD: 18.2 % — HIGH (ref 10.3–14.5)
SODIUM SERPL-SCNC: 136 MMOL/L — SIGNIFICANT CHANGE UP (ref 135–145)
WBC # BLD: 16.5 K/UL — HIGH (ref 3.8–10.5)
WBC # FLD AUTO: 16.5 K/UL — HIGH (ref 3.8–10.5)

## 2022-12-10 PROCEDURE — 99024 POSTOP FOLLOW-UP VISIT: CPT

## 2022-12-10 PROCEDURE — 99291 CRITICAL CARE FIRST HOUR: CPT

## 2022-12-10 PROCEDURE — 71045 X-RAY EXAM CHEST 1 VIEW: CPT | Mod: 26

## 2022-12-10 RX ORDER — POTASSIUM CHLORIDE 20 MEQ
40 PACKET (EA) ORAL ONCE
Refills: 0 | Status: COMPLETED | OUTPATIENT
Start: 2022-12-10 | End: 2022-12-10

## 2022-12-10 RX ORDER — ALPRAZOLAM 0.25 MG
0.25 TABLET ORAL DAILY
Refills: 0 | Status: DISCONTINUED | OUTPATIENT
Start: 2022-12-10 | End: 2022-12-12

## 2022-12-10 RX ADMIN — OXYCODONE HYDROCHLORIDE 5 MILLIGRAM(S): 5 TABLET ORAL at 21:16

## 2022-12-10 RX ADMIN — Medication 500 MILLIGRAM(S): at 11:18

## 2022-12-10 RX ADMIN — Medication 112 MICROGRAM(S): at 07:29

## 2022-12-10 RX ADMIN — Medication 325 MILLIGRAM(S): at 11:18

## 2022-12-10 RX ADMIN — Medication 650 MILLIGRAM(S): at 16:57

## 2022-12-10 RX ADMIN — Medication 50 MILLIGRAM(S): at 05:26

## 2022-12-10 RX ADMIN — Medication 40 MILLIEQUIVALENT(S): at 05:25

## 2022-12-10 RX ADMIN — Medication 50 MILLIGRAM(S): at 16:57

## 2022-12-10 RX ADMIN — ATORVASTATIN CALCIUM 20 MILLIGRAM(S): 80 TABLET, FILM COATED ORAL at 21:16

## 2022-12-10 RX ADMIN — APIXABAN 2.5 MILLIGRAM(S): 2.5 TABLET, FILM COATED ORAL at 16:56

## 2022-12-10 RX ADMIN — PANTOPRAZOLE SODIUM 40 MILLIGRAM(S): 20 TABLET, DELAYED RELEASE ORAL at 05:26

## 2022-12-10 RX ADMIN — OXYCODONE HYDROCHLORIDE 5 MILLIGRAM(S): 5 TABLET ORAL at 10:55

## 2022-12-10 RX ADMIN — POLYETHYLENE GLYCOL 3350 17 GRAM(S): 17 POWDER, FOR SOLUTION ORAL at 11:17

## 2022-12-10 RX ADMIN — SERTRALINE 100 MILLIGRAM(S): 25 TABLET, FILM COATED ORAL at 11:17

## 2022-12-10 RX ADMIN — APIXABAN 2.5 MILLIGRAM(S): 2.5 TABLET, FILM COATED ORAL at 05:26

## 2022-12-10 RX ADMIN — OXYCODONE HYDROCHLORIDE 5 MILLIGRAM(S): 5 TABLET ORAL at 10:00

## 2022-12-10 RX ADMIN — SODIUM CHLORIDE 3 MILLILITER(S): 9 INJECTION INTRAMUSCULAR; INTRAVENOUS; SUBCUTANEOUS at 21:13

## 2022-12-10 RX ADMIN — Medication 650 MILLIGRAM(S): at 17:46

## 2022-12-10 RX ADMIN — MEGESTROL ACETATE 400 MILLIGRAM(S): 40 SUSPENSION ORAL at 11:19

## 2022-12-10 RX ADMIN — Medication 0.25 MILLIGRAM(S): at 21:16

## 2022-12-10 RX ADMIN — SODIUM CHLORIDE 3 MILLILITER(S): 9 INJECTION INTRAMUSCULAR; INTRAVENOUS; SUBCUTANEOUS at 13:06

## 2022-12-10 RX ADMIN — SENNA PLUS 2 TABLET(S): 8.6 TABLET ORAL at 21:17

## 2022-12-10 RX ADMIN — OXYCODONE HYDROCHLORIDE 5 MILLIGRAM(S): 5 TABLET ORAL at 22:16

## 2022-12-10 RX ADMIN — Medication 40 MILLIGRAM(S): at 05:26

## 2022-12-10 RX ADMIN — SODIUM CHLORIDE 3 MILLILITER(S): 9 INJECTION INTRAMUSCULAR; INTRAVENOUS; SUBCUTANEOUS at 07:23

## 2022-12-10 RX ADMIN — Medication 81 MILLIGRAM(S): at 11:18

## 2022-12-10 NOTE — PROGRESS NOTE ADULT - SUBJECTIVE AND OBJECTIVE BOX
PRAVEEN GONZALEZ  MRN-[id number]    HPI:  82 year old female with a medical history of CAD, MVP, LBBB, Atrial Fibrillation, HTN, HLD, Hypothyroidism, Breast CA s/p b/l mastectomy, originally admitted to [facility] after experiencing acute onset chest pain, found to have NSTEMI, s/p cardiac cath that revealed triple vessel CAD with IABP placed, placed on Heparin gtt and loaded with Plavix.  TTE also confirmed Severe MR. Patient transferred to [facility] ICU for surgical evaluation and medical management on . IABP d/c'd  with subsequent hypotension, bradycardia, +NSTEMI, with patient urgently brought to cath lab and IABP reinserted in subsequent groin.  Pt transferred to CTICU under Dr. Ackerman service. Patient underwent CABG X 3, MVR(t), DEVONTE Clip on 22. IABP removed . Postoperative course significant for a slow wean of primacor for cardiogenic shock and levophed for hypotension, ABLA requiring blood, acute hypoxic respiratory failure (since resolved), hypervolemia (resolving with diuretics), and CHAVO due to cardiorenal syndrome (resolved). Patient was discharged to [facility] on .     Now readmitted , sent from [facility] with complaints of tachycardia, SOB, and high output serosanguinous drainage from prior mediastinal CT site. Patient states she began feeling progressively weaker starting yesterday. Admits to some aching, non-radiating chest pain, mostly isolated to her lower left chest. States it is not better or worse with movement, or deep breathing. Also admits to palpitations and shortness of breath, and some dizziness which has since improved. Denies any wheezing, abd pain, nausea, vomiting, constipation, headaches, fevers, or chills.       (08 Dec 2022 16:56)      Surgery/Hospital Course:    Today:  No acute events, decreased drainage from mediastinal tube site    ICU Vital Signs Last 24 Hrs  T(C): 36.7 (10 Dec 2022 04:00), Max: 37.1 (10 Dec 2022 00:00)  T(F): 98.1 (10 Dec 2022 04:00), Max: 98.8 (10 Dec 2022 00:00)  HR: 71 (10 Dec 2022 07:00) (66 - 86)  BP: 112/58 (10 Dec 2022 07:00) (96/51 - 134/66)  BP(mean): 82 (10 Dec 2022 07:00) (69 - 94)  ABP: --  ABP(mean): --  RR: 14 (10 Dec 2022 07:00) (10 - 24)  SpO2: 95% (10 Dec 2022 07:00) (94% - 100%)    O2 Parameters below as of 10 Dec 2022 07:00  Patient On (Oxygen Delivery Method): room air    Physical Exam:  Gen: A&O   CNS: non focal 	  Neck: no JVD  RES : clear , no wheezing              CVS: Regular  rhythm. Normal S1/S2  Abd: Soft, non-distended. Bowel sounds present.  Skin: No rash.  Ext:  no edema    ============================I/O===========================   I&O's Detail    09 Dec 2022 07:01  -  10 Dec 2022 07:00  --------------------------------------------------------  IN:    Oral Fluid: 360 mL  Total IN: 360 mL    OUT:    Chest Tube (mL): 1350 mL    Voided (mL): 1050 mL  Total OUT: 2400 mL    Total NET: -2040 mL    ============================ LABS =========================                        9.1    16.50 )-----------( 132      ( 10 Dec 2022 03:05 )             28.3     12-10    136  |  100  |  40.3<H>  ----------------------------<  129<H>  3.8   |  27.0  |  1.12    Ca    8.1<L>      10 Dec 2022 03:05  Mg     2.5     12-10    TPro  6.0<L>  /  Alb  2.7<L>  /  TBili  1.3  /  DBili  0.5<H>  /  AST  66<H>  /  ALT  89<H>  /  AlkPhos  265<H>      LIVER FUNCTIONS - ( 09 Dec 2022 02:55 )  Alb: 2.7 g/dL / Pro: 6.0 g/dL / ALK PHOS: 265 U/L / ALT: 89 U/L / AST: 66 U/L / GGT: x           PT/INR - ( 08 Dec 2022 17:35 )   PT: 14.9 sec;   INR: 1.28 ratio         PTT - ( 08 Dec 2022 17:35 )  PTT:26.0 sec    Urinalysis Basic - ( 09 Dec 2022 09:20 )    Color: Yellow / Appearance: very cloudy / S.015 / pH: x  Gluc: x / Ketone: Negative  / Bili: Negative / Urobili: Negative mg/dL   Blood: x / Protein: Negative / Nitrite: Negative   Leuk Esterase: Small / RBC: 3-5 /HPF / WBC 6-10 /HPF   Sq Epi: x / Non Sq Epi: Few / Bacteria: Many      ======================Micro/Rad/Cardio=================  Culture: Reviewed   CXR: Reviewed  Echo:Reviewed  ======================================================  PAST MEDICAL & SURGICAL HISTORY:  Hypertension      Hyperlipidemia      Hypothyroid      Breast cancer      History of mitral valve prolapse      S/P mastectomy, left      S/P CABG x 3      S/P MVR (mitral valve replacement)        ====================ASSESSMENT ==============    Post op Hypovolemia  Post op respiratory insufficiency       Plan:  ====================== NEUROLOGY=====================  acetaminophen     Tablet .. 650 milliGRAM(s) Oral every 6 hours PRN Mild Pain (1 - 3)  oxyCODONE    IR 5 milliGRAM(s) Oral every 4 hours PRN Severe Pain (7 - 10)  sertraline 100 milliGRAM(s) Oral daily    ==================== RESPIRATORY======================  Post op respiratory insufficiency  Mechanical Ventilation:  NONE  Room air with following of continuous pulse oximetry    ====================CARDIOVASCULAR==================  Post op Hypovolemia, resoling   metoprolol tartrate 50 milliGRAM(s) Oral two times a day  torsemide 40 milliGRAM(s) Oral daily    ===================HEMATOLOGIC/ONC ===================  Monitor H&H/Plts    apixaban 2.5 milliGRAM(s) Oral two times a day  aspirin enteric coated 81 milliGRAM(s) Oral daily    ===================== RENAL =========================  Continue monitoring urine output, I&OS, BUN/Cr     ==================== GASTROINTESTINAL===================  ascorbic acid 500 milliGRAM(s) Oral daily  ferrous    sulfate 325 milliGRAM(s) Oral daily  pantoprazole    Tablet 40 milliGRAM(s) Oral before breakfast  polyethylene glycol 3350 17 Gram(s) Oral daily  senna 2 Tablet(s) Oral at bedtime  sodium chloride 0.9% lock flush 3 milliLiter(s) IV Push every 8 hours    =======================    ENDOCRINE  =====================  atorvastatin 20 milliGRAM(s) Oral at bedtime  levothyroxine 112 MICROGram(s) Oral daily  megestrol Suspension 400 milliGRAM(s) Oral daily    ========================INFECTIOUS DISEASE================      -Close hemodynamic and drain monitoring and management  -Monitor Neurologic status ,   -Head of the bed should remain elevated to 45 degrees,  -Monitor adequacy of oxygenation on room air   -Monitor for arrhythmias and monitor parameters for organ perfusion,  -Glycemic control is satisfactory,  -Nutritional goals will be met using po eventually , insure adequate caloric intake and monitor the same ,  -Electrolytes have been repleted as necessary , pain control has been achieved  and wound care has been carried out ,  -Stress ulcer and VTE prophylaxis will be achieved,  -Agressive PT and early mobility and ambulation goals will be met,  -The family was updated about the course and plan .    I have spent 35 minutes providing acute care for this critically ill patient     Patient requires continuous monitoring with bedside rhythm monitoring, pulse ox monitoring, and intermittent blood gas analysis. Care plan discussed with ICU care team. Patient remained critical and at risk for life threatening decompensation.

## 2022-12-10 NOTE — PROGRESS NOTE ADULT - SUBJECTIVE AND OBJECTIVE BOX
Significant recent/past 24 hr events:  No acute overnight events. Pt remained HD stable with without acute complaints.  Pt currently in NAD and denies N/V/D HA, dizziness, blurry vision, numbness/tingling, SOB, cough, chest pain, palpitations, abd pain or urinary sx's.     Subjective:  Review of Systems       ROS negative x 10 systems except as noted above    Patient is a 82y old  Female who presents with a chief complaint of Tachycardia, SOB, CT site drainage (09 Dec 2022 12:17)    HPI:  82 year old female with a medical history of CAD, MVP, LBBB, Atrial Fibrillation, HTN, HLD, Hypothyroidism, Breast CA s/p b/l mastectomy, originally admitted to [facility] after experiencing acute onset chest pain, found to have NSTEMI, s/p cardiac cath that revealed triple vessel CAD with IABP placed, placed on Heparin gtt and loaded with Plavix.  TTE also confirmed Severe MR. Patient transferred to [facility] ICU for surgical evaluation and medical management on . IABP d/c'd  with subsequent hypotension, bradycardia, +NSTEMI, with patient urgently brought to cath lab and IABP reinserted in subsequent groin.  Pt transferred to CTICU under Dr. Ackerman service. Patient underwent CABG X 3, MVR(t), DEVONTE Clip on 22. IABP removed . Postoperative course significant for a slow wean of primacor for cardiogenic shock and levophed for hypotension, ABLA requiring blood, acute hypoxic respiratory failure (since resolved), hypervolemia (resolving with diuretics), and CHAVO due to cardiorenal syndrome (resolved). Patient was discharged to [facility] on .     Now readmitted , sent from [facility] with complaints of tachycardia, SOB, and high output serosanguinous drainage from prior mediastinal CT site. Patient states she began feeling progressively weaker starting yesterday. Admits to some aching, non-radiating chest pain, mostly isolated to her lower left chest. States it is not better or worse with movement, or deep breathing. Also admits to palpitations and shortness of breath, and some dizziness which has since improved. Denies any wheezing, abd pain, nausea, vomiting, constipation, headaches, fevers, or chills.       (08 Dec 2022 16:56)    PAST MEDICAL & SURGICAL HISTORY:  Hypertension      Hyperlipidemia      Hypothyroid      Breast cancer      History of mitral valve prolapse      S/P mastectomy, left      S/P CABG x 3      S/P MVR (mitral valve replacement)        FAMILY HISTORY:  Family history of systemic lupus erythematosus (Child)    Family history of CVA (Grandparent)        Vitals   ICU Vital Signs Last 24 Hrs  T(C): 37.1 (10 Dec 2022 00:00), Max: 37.1 (10 Dec 2022 00:00)  T(F): 98.8 (10 Dec 2022 00:00), Max: 98.8 (10 Dec 2022 00:00)  HR: 79 (10 Dec 2022 03:00) (66 - 138)  BP: 113/60 (10 Dec 2022 03:00) (96/51 - 151/76)  BP(mean): 81 (10 Dec 2022 03:00) (69 - 116)  ABP: --  ABP(mean): --  RR: 11 (10 Dec 2022 03:00) (10 - 26)  SpO2: 95% (10 Dec 2022 03:00) (95% - 100%)    O2 Parameters below as of 10 Dec 2022 03:00  Patient On (Oxygen Delivery Method): room air        I&O's Detail    08 Dec 2022 07:01  -  09 Dec 2022 07:00  --------------------------------------------------------  IN:    Oral Fluid: 60 mL  Total IN: 60 mL    OUT:    Voided (mL): 550 mL  Total OUT: 550 mL    Total NET: -490 mL      09 Dec 2022 07:01  -  10 Dec 2022 04:41  --------------------------------------------------------  IN:    Oral Fluid: 240 mL  Total IN: 240 mL    OUT:    Chest Tube (mL): 1110 mL    Voided (mL): 650 mL  Total OUT: 1760 mL    Total NET: -1520 mL      LABS                        9.1    16.50 )-----------( 132      ( 10 Dec 2022 03:05 )             28.3     12-10    136  |  100  |  40.3<H>  ----------------------------<  129<H>  3.8   |  27.0  |  1.12    Ca    8.1<L>      10 Dec 2022 03:05  Mg     2.5     12-10    TPro  6.0<L>  /  Alb  2.7<L>  /  TBili  1.3  /  DBili  0.5<H>  /  AST  66<H>  /  ALT  89<H>  /  AlkPhos  265<H>      LIVER FUNCTIONS - ( 09 Dec 2022 02:55 )  Alb: 2.7 g/dL / Pro: 6.0 g/dL / ALK PHOS: 265 U/L / ALT: 89 U/L / AST: 66 U/L / GGT: x           PT/INR - ( 08 Dec 2022 17:35 )   PT: 14.9 sec;   INR: 1.28 ratio         PTT - ( 08 Dec 2022 17:35 )  PTT:26.0 sec      Urinalysis Basic - ( 09 Dec 2022 09:20 )    Color: Yellow / Appearance: very cloudy / S.015 / pH: x  Gluc: x / Ketone: Negative  / Bili: Negative / Urobili: Negative mg/dL   Blood: x / Protein: Negative / Nitrite: Negative   Leuk Esterase: Small / RBC: 3-5 /HPF / WBC 6-10 /HPF   Sq Epi: x / Non Sq Epi: Few / Bacteria: Many        MEDICATIONS  (STANDING):  apixaban 2.5 milliGRAM(s) Oral two times a day  ascorbic acid 500 milliGRAM(s) Oral daily  aspirin enteric coated 81 milliGRAM(s) Oral daily  atorvastatin 20 milliGRAM(s) Oral at bedtime  ferrous    sulfate 325 milliGRAM(s) Oral daily  furosemide    Tablet 40 milliGRAM(s) Oral daily  influenza  Vaccine (HIGH DOSE) 0.7 milliLiter(s) IntraMuscular once  levothyroxine 112 MICROGram(s) Oral daily  lidocaine   4% Patch 1 Patch Transdermal daily  megestrol Suspension 400 milliGRAM(s) Oral daily  metoprolol tartrate 50 milliGRAM(s) Oral two times a day  pantoprazole    Tablet 40 milliGRAM(s) Oral before breakfast  polyethylene glycol 3350 17 Gram(s) Oral daily  potassium chloride   Powder 40 milliEquivalent(s) Oral once  senna 2 Tablet(s) Oral at bedtime  sertraline 100 milliGRAM(s) Oral daily  sodium chloride 0.9% lock flush 3 milliLiter(s) IV Push every 8 hours    MEDICATIONS  (PRN):  acetaminophen     Tablet .. 650 milliGRAM(s) Oral every 6 hours PRN Mild Pain (1 - 3)  oxyCODONE    IR 5 milliGRAM(s) Oral every 4 hours PRN Severe Pain (7 - 10)      Allergies:  amiodarone (Hives)  iodinated radiocontrast agents (Vomiting; Headache)      Physical Exam:   General: Well appearing, NAD  Neuro: AxO x3, non-focal, MOSHER  Cardiac: S1S2, no murmurs  Pulm: diminished b/l lung bases, no wheezing or rales  Abdomen: Soft, NT, ND  Peripheral: +DP pulses b/l, no peripheral edema       Code Status: Full Code

## 2022-12-10 NOTE — PROGRESS NOTE ADULT - PROBLEM SELECTOR PLAN 5
Bilateral pleural effusions  Lt PTC placed 12/9  -Daily CXR while CT in place  -Monitor output  -Cont Deep breathing and IS therapy   -Daily chest PT

## 2022-12-10 NOTE — PROGRESS NOTE ADULT - PROBLEM SELECTOR PLAN 4
TTE 12/5 EF 35-40%  TTE 12/8 EF 30-35%  BNP pending with AM labs  Lasix resumed  Discuss starting GDMT including ACE/ARB

## 2022-12-11 LAB
ALBUMIN SERPL ELPH-MCNC: 3 G/DL — LOW (ref 3.3–5.2)
ALP SERPL-CCNC: 220 U/L — HIGH (ref 40–120)
ALT FLD-CCNC: 66 U/L — HIGH
ANION GAP SERPL CALC-SCNC: 11 MMOL/L — SIGNIFICANT CHANGE UP (ref 5–17)
AST SERPL-CCNC: 36 U/L — HIGH
BILIRUB SERPL-MCNC: 0.9 MG/DL — SIGNIFICANT CHANGE UP (ref 0.4–2)
BUN SERPL-MCNC: 44.2 MG/DL — HIGH (ref 8–20)
CALCIUM SERPL-MCNC: 8.4 MG/DL — SIGNIFICANT CHANGE UP (ref 8.4–10.5)
CHLORIDE SERPL-SCNC: 100 MMOL/L — SIGNIFICANT CHANGE UP (ref 96–108)
CO2 SERPL-SCNC: 28 MMOL/L — SIGNIFICANT CHANGE UP (ref 22–29)
CREAT SERPL-MCNC: 1.1 MG/DL — SIGNIFICANT CHANGE UP (ref 0.5–1.3)
EGFR: 50 ML/MIN/1.73M2 — LOW
GLUCOSE SERPL-MCNC: 118 MG/DL — HIGH (ref 70–99)
HCT VFR BLD CALC: 32.3 % — LOW (ref 34.5–45)
HGB BLD-MCNC: 10.1 G/DL — LOW (ref 11.5–15.5)
MAGNESIUM SERPL-MCNC: 2 MG/DL — SIGNIFICANT CHANGE UP (ref 1.6–2.6)
MCHC RBC-ENTMCNC: 30.4 PG — SIGNIFICANT CHANGE UP (ref 27–34)
MCHC RBC-ENTMCNC: 31.3 GM/DL — LOW (ref 32–36)
MCV RBC AUTO: 97.3 FL — SIGNIFICANT CHANGE UP (ref 80–100)
PLATELET # BLD AUTO: 114 K/UL — LOW (ref 150–400)
POTASSIUM SERPL-MCNC: 4.5 MMOL/L — SIGNIFICANT CHANGE UP (ref 3.5–5.3)
POTASSIUM SERPL-SCNC: 4.5 MMOL/L — SIGNIFICANT CHANGE UP (ref 3.5–5.3)
PROT SERPL-MCNC: 6.2 G/DL — LOW (ref 6.6–8.7)
RBC # BLD: 3.32 M/UL — LOW (ref 3.8–5.2)
RBC # FLD: 18.7 % — HIGH (ref 10.3–14.5)
SODIUM SERPL-SCNC: 139 MMOL/L — SIGNIFICANT CHANGE UP (ref 135–145)
WBC # BLD: 18.95 K/UL — HIGH (ref 3.8–10.5)
WBC # FLD AUTO: 18.95 K/UL — HIGH (ref 3.8–10.5)

## 2022-12-11 PROCEDURE — 99024 POSTOP FOLLOW-UP VISIT: CPT

## 2022-12-11 PROCEDURE — 71045 X-RAY EXAM CHEST 1 VIEW: CPT | Mod: 26

## 2022-12-11 RX ADMIN — PANTOPRAZOLE SODIUM 40 MILLIGRAM(S): 20 TABLET, DELAYED RELEASE ORAL at 05:11

## 2022-12-11 RX ADMIN — Medication 81 MILLIGRAM(S): at 12:57

## 2022-12-11 RX ADMIN — SODIUM CHLORIDE 3 MILLILITER(S): 9 INJECTION INTRAMUSCULAR; INTRAVENOUS; SUBCUTANEOUS at 06:03

## 2022-12-11 RX ADMIN — Medication 325 MILLIGRAM(S): at 12:58

## 2022-12-11 RX ADMIN — OXYCODONE HYDROCHLORIDE 5 MILLIGRAM(S): 5 TABLET ORAL at 16:26

## 2022-12-11 RX ADMIN — OXYCODONE HYDROCHLORIDE 5 MILLIGRAM(S): 5 TABLET ORAL at 21:54

## 2022-12-11 RX ADMIN — Medication 112 MICROGRAM(S): at 05:11

## 2022-12-11 RX ADMIN — OXYCODONE HYDROCHLORIDE 5 MILLIGRAM(S): 5 TABLET ORAL at 08:30

## 2022-12-11 RX ADMIN — Medication 500 MILLIGRAM(S): at 12:57

## 2022-12-11 RX ADMIN — SERTRALINE 100 MILLIGRAM(S): 25 TABLET, FILM COATED ORAL at 12:59

## 2022-12-11 RX ADMIN — Medication 40 MILLIGRAM(S): at 05:11

## 2022-12-11 RX ADMIN — SODIUM CHLORIDE 3 MILLILITER(S): 9 INJECTION INTRAMUSCULAR; INTRAVENOUS; SUBCUTANEOUS at 13:00

## 2022-12-11 RX ADMIN — ATORVASTATIN CALCIUM 20 MILLIGRAM(S): 80 TABLET, FILM COATED ORAL at 21:54

## 2022-12-11 RX ADMIN — SODIUM CHLORIDE 3 MILLILITER(S): 9 INJECTION INTRAMUSCULAR; INTRAVENOUS; SUBCUTANEOUS at 22:00

## 2022-12-11 RX ADMIN — APIXABAN 2.5 MILLIGRAM(S): 2.5 TABLET, FILM COATED ORAL at 05:11

## 2022-12-11 RX ADMIN — Medication 0.25 MILLIGRAM(S): at 21:54

## 2022-12-11 RX ADMIN — OXYCODONE HYDROCHLORIDE 5 MILLIGRAM(S): 5 TABLET ORAL at 08:05

## 2022-12-11 RX ADMIN — Medication 50 MILLIGRAM(S): at 18:25

## 2022-12-11 RX ADMIN — APIXABAN 2.5 MILLIGRAM(S): 2.5 TABLET, FILM COATED ORAL at 18:25

## 2022-12-11 RX ADMIN — Medication 50 MILLIGRAM(S): at 05:11

## 2022-12-11 RX ADMIN — POLYETHYLENE GLYCOL 3350 17 GRAM(S): 17 POWDER, FOR SOLUTION ORAL at 12:59

## 2022-12-11 RX ADMIN — SENNA PLUS 2 TABLET(S): 8.6 TABLET ORAL at 21:54

## 2022-12-11 RX ADMIN — OXYCODONE HYDROCHLORIDE 5 MILLIGRAM(S): 5 TABLET ORAL at 14:26

## 2022-12-11 NOTE — PROGRESS NOTE ADULT - PROBLEM SELECTOR PLAN 5
Bilateral pleural effusions with Left pigtail catheter placed 12/9.  Daily CXR while CT in place.  Monitor output.  Continue Torsemide 40mg QD for diuresis. Monitor strict I/Os.  Continue Deep breathing and IS therapy.  Daily chest PT

## 2022-12-11 NOTE — PROGRESS NOTE ADULT - PROBLEM SELECTOR PLAN 4
no TTE 12/5 EF 35-40%.  TTE 12/8 EF 30-35%.  Continue Torsemide 40mg QD for diuresis. Monitor strict I/Os. Supplement electrolytes to maintain K>4 and Mg>2.   Discuss starting GDMT including ACE/ARB as BP allows.

## 2022-12-11 NOTE — PROGRESS NOTE ADULT - PROBLEM SELECTOR PLAN 1
Readmitted 12/8 for tachycardia, SOB, mediastinal chest tube site drainage  Reportedly, 1L serosang drainage from mediastinal chest tube site with ostomy bag placed over site.  Suspected pericardial effusion.   TTE on arrival showed EF 30-35%, trivial pericardial effusion   Patient remains Hemodynamically stable.

## 2022-12-11 NOTE — PROGRESS NOTE ADULT - SUBJECTIVE AND OBJECTIVE BOX
POD #14 s/p CABG X 3 (LIMA-LAD, SVG-OM1, SVG-OM2), Mitral valve replacement (#27 Epic Plus Valve), and left atrial appendage clipping (45 V Clip)  Readmitted  with drainage from former chest tube site, and Left pleural effusion    PAST MEDICAL & SURGICAL HISTORY:  Hypertension  Hyperlipidemia  Hypothyroid  Breast cancer  History of mitral valve prolapse  S/P mastectomy, left    FAMILY HISTORY:  Family history of systemic lupus erythematosus (Child)  Family history of CVA (Grandparent)    Brief Hospital Course: 82F PMH of CAD, MVP, LBBB, Atrial Fibrillation, HTN, HLD, Hypothyroidism, Breast CA s/p b/l mastectomy, originally admitted to [facility] after experiencing acute onset chest pain, found to have NSTEMI, s/p cardiac cath that revealed triple vessel CAD with IABP placed and TTE showed severe MR. Patient transferred to [facility] ICU for surgical evaluation and medical management on . Patient underwent CABG X 3, MVR(t), DEVONTE Clip on 22. IABP removed . Postoperative course significant for cardiogenic shock requiring Primacor, ABLA, CHAVO. Patient was discharged to [facility] on . Now readmitted  with tachycardia, SOB, and high output serosanguinous drainage from prior mediastinal CT site. Left pigtail catheter placed for a Left pleural effusion .     Significant recent/past 24 hr events: No overnight events reported.    Subjective: Patient lying in bed in NAD. +Tolerating diet. +Passing BMs since surgery. +Pain currently controlled. Denies fevers, chills, lightheadedness, dizziness, HA, CP, palpitations, SOB, cough, abdominal pain, N/V, diarrhea, numbness/tingling in extremities, or any other acute complaints. ROS negative x 10 systems except as noted above.    MEDICATIONS  (STANDING):  apixaban 2.5 milliGRAM(s) Oral two times a day  ascorbic acid 500 milliGRAM(s) Oral daily  aspirin enteric coated 81 milliGRAM(s) Oral daily  atorvastatin 20 milliGRAM(s) Oral at bedtime  ferrous    sulfate 325 milliGRAM(s) Oral daily  levothyroxine 112 MICROGram(s) Oral daily  lidocaine   4% Patch 1 Patch Transdermal daily  megestrol Suspension 400 milliGRAM(s) Oral daily  metoprolol tartrate 50 milliGRAM(s) Oral two times a day  pantoprazole    Tablet 40 milliGRAM(s) Oral before breakfast  polyethylene glycol 3350 17 Gram(s) Oral daily  senna 2 Tablet(s) Oral at bedtime  sertraline 100 milliGRAM(s) Oral daily  sodium chloride 0.9% lock flush 3 milliLiter(s) IV Push every 8 hours  torsemide 40 milliGRAM(s) Oral daily    MEDICATIONS  (PRN):  acetaminophen     Tablet .. 650 milliGRAM(s) Oral every 6 hours PRN Mild Pain (1 - 3)  ALPRAZolam 0.25 milliGRAM(s) Oral daily PRN anxiety  oxyCODONE    IR 5 milliGRAM(s) Oral every 4 hours PRN Severe Pain (7 - 10)    Allergies:  amiodarone (Hives)  iodinated radiocontrast agents (Vomiting; Headache)    Vitals   T(C): 36.6 (10 Dec 2022 20:30), Max: 36.8 (10 Dec 2022 15:45)  T(F): 97.8 (10 Dec 2022 20:30), Max: 98.2 (10 Dec 2022 15:45)  HR: 85 (11 Dec 2022 00:55) (71 - 89)  BP: 110/73 (11 Dec 2022 00:55) (100/58 - 127/62)  BP(mean): 83 (10 Dec 2022 10:55) (78 - 87)  RR: 18 (11 Dec 2022 00:55) (10 - 21)  SpO2: 97% (11 Dec 2022 00:55) (94% - 97%)  O2 Parameters below as of 11 Dec 2022 00:55  Patient On (Oxygen Delivery Method): room air    I&O's Detail    09 Dec 2022 07:01  -  10 Dec 2022 07:00  --------------------------------------------------------  IN:    Oral Fluid: 360 mL  Total IN: 360 mL    OUT:    Chest Tube (mL): 1350 mL    Voided (mL): 1050 mL  Total OUT: 2400 mL    Total NET: -2040 mL      10 Dec 2022 07:01  -  11 Dec 2022 02:41  --------------------------------------------------------  IN:    Oral Fluid: 360 mL  Total IN: 360 mL    OUT:    Chest Tube (mL): 70 mL    Voided (mL): 200 mL  Total OUT: 270 mL    Total NET: 90 mL    Physical Exam  Neuro: A+O x 3, non-focal, speech clear and intact  HEENT:  NCAT, No conjuctival edema or icterus, no thrush.    Neck:  Supple, trachea midline  Pulm: +Diminished BSs at bases bilaterally, no accessory muscle use noted  Chest: Left pigtail CT to waterseal with dressing intact and no air leak  CV: regular rate, regular rhythm, +S1S2, no murmur or rub noted  Abd: soft, NT, ND, + BS  Ext: MOSHER x 4, +trace LE edema b/l, no cyanosis or clubbing, distal motor/neuro/circ intact  Skin: warm, dry, perfused  Incisions: midsternal incision open to air C/D/I, sternum stable, LLE harvest site C/D/I     LABS                        9.1    16.50 )-----------( 132      ( 10 Dec 2022 03:05 )             28.3     12-10    136  |  100  |  40.3<H>  ----------------------------<  129<H>  3.8   |  27.0  |  1.12    Ca    8.1<L>      10 Dec 2022 03:05  Mg     2.5     12-10    TPro  6.0<L>  /  Alb  2.7<L>  /  TBili  1.3  /  DBili  0.5<H>  /  AST  66<H>  /  ALT  89<H>  /  AlkPhos  265<H>  12    Urinalysis Basic - ( 09 Dec 2022 09:20 )  Color: Yellow / Appearance: very cloudy / S.015 / pH: x  Gluc: x / Ketone: Negative  / Bili: Negative / Urobili: Negative mg/dL   Blood: x / Protein: Negative / Nitrite: Negative   Leuk Esterase: Small / RBC: 3-5 /HPF / WBC 6-10 /HPF   Sq Epi: x / Non Sq Epi: Few / Bacteria: Many    Last CXR:  < from: Xray Chest 1 View- PORTABLE-Urgent (Xray Chest 1 View- PORTABLE-Urgent .) (22 @ 12:35) >  Impression:  Status post placement of left basilar pigtail chest tube with mild decrease in left effusion. The left effusion is still small with lower lobe consolidation.  Small right effusion and lower lobe consolidation. Previous pulmonary venous congestion has resolved.  < end of copied text >    Last TTE:  < from: TTE Echo Complete w/o Contrast w/ Doppler (22 @ 20:33) >  Summary:   1. Left ventricular ejection fraction, by visual estimation, is 30 to 35%.   2. Entire apex, mid and apical anterior wall, mid andapical inferior septum, and mid and apical inferior wall are abnormal as described above.   3. Small right ventricle.   4. Moderately enlarged left atrium.   5. Normal right atrial size.   6. Large pleural effusion in both left and right lateral regions.   7. Trivial pericardial effusion.   8. Moderate mitral annular calcification.   9. Trace mitral valve regurgitation.  < end of copied text >

## 2022-12-11 NOTE — PROGRESS NOTE ADULT - PROBLEM SELECTOR PLAN 3
Continue Torsemide 40mg QD for diuresis. Monitor strict I/Os. Supplement electrolytes to maintain K>4 and Mg>2.   Continue Eliquis 2.5BID for MVR.

## 2022-12-12 LAB
ALBUMIN SERPL ELPH-MCNC: 2.3 G/DL — LOW (ref 3.3–5.2)
ALP SERPL-CCNC: 186 U/L — HIGH (ref 40–120)
ALT FLD-CCNC: 60 U/L — HIGH
ANION GAP SERPL CALC-SCNC: 12 MMOL/L — SIGNIFICANT CHANGE UP (ref 5–17)
AST SERPL-CCNC: 40 U/L — HIGH
BILIRUB SERPL-MCNC: 0.8 MG/DL — SIGNIFICANT CHANGE UP (ref 0.4–2)
BUN SERPL-MCNC: 45.8 MG/DL — HIGH (ref 8–20)
CALCIUM SERPL-MCNC: 8.2 MG/DL — LOW (ref 8.4–10.5)
CHLORIDE SERPL-SCNC: 104 MMOL/L — SIGNIFICANT CHANGE UP (ref 96–108)
CO2 SERPL-SCNC: 25 MMOL/L — SIGNIFICANT CHANGE UP (ref 22–29)
CREAT SERPL-MCNC: 0.95 MG/DL — SIGNIFICANT CHANGE UP (ref 0.5–1.3)
EGFR: 60 ML/MIN/1.73M2 — SIGNIFICANT CHANGE UP
GLUCOSE SERPL-MCNC: 86 MG/DL — SIGNIFICANT CHANGE UP (ref 70–99)
HCT VFR BLD CALC: 30.7 % — LOW (ref 34.5–45)
HGB BLD-MCNC: 9.7 G/DL — LOW (ref 11.5–15.5)
MAGNESIUM SERPL-MCNC: 2 MG/DL — SIGNIFICANT CHANGE UP (ref 1.6–2.6)
MCHC RBC-ENTMCNC: 30.1 PG — SIGNIFICANT CHANGE UP (ref 27–34)
MCHC RBC-ENTMCNC: 31.6 GM/DL — LOW (ref 32–36)
MCV RBC AUTO: 95.3 FL — SIGNIFICANT CHANGE UP (ref 80–100)
PLATELET # BLD AUTO: 119 K/UL — LOW (ref 150–400)
POTASSIUM SERPL-MCNC: 3.9 MMOL/L — SIGNIFICANT CHANGE UP (ref 3.5–5.3)
POTASSIUM SERPL-SCNC: 3.9 MMOL/L — SIGNIFICANT CHANGE UP (ref 3.5–5.3)
PROT SERPL-MCNC: 5.5 G/DL — LOW (ref 6.6–8.7)
RBC # BLD: 3.22 M/UL — LOW (ref 3.8–5.2)
RBC # FLD: 18.9 % — HIGH (ref 10.3–14.5)
SODIUM SERPL-SCNC: 141 MMOL/L — SIGNIFICANT CHANGE UP (ref 135–145)
WBC # BLD: 13.71 K/UL — HIGH (ref 3.8–10.5)
WBC # FLD AUTO: 13.71 K/UL — HIGH (ref 3.8–10.5)

## 2022-12-12 PROCEDURE — 71045 X-RAY EXAM CHEST 1 VIEW: CPT | Mod: 26

## 2022-12-12 PROCEDURE — 99231 SBSQ HOSP IP/OBS SF/LOW 25: CPT | Mod: 24

## 2022-12-12 RX ORDER — COLCHICINE 0.6 MG
0.6 TABLET ORAL EVERY 12 HOURS
Refills: 0 | Status: DISCONTINUED | OUTPATIENT
Start: 2022-12-13 | End: 2022-12-18

## 2022-12-12 RX ORDER — COLCHICINE 0.6 MG
1.2 TABLET ORAL ONCE
Refills: 0 | Status: COMPLETED | OUTPATIENT
Start: 2022-12-12 | End: 2022-12-12

## 2022-12-12 RX ADMIN — ATORVASTATIN CALCIUM 20 MILLIGRAM(S): 80 TABLET, FILM COATED ORAL at 21:59

## 2022-12-12 RX ADMIN — SODIUM CHLORIDE 3 MILLILITER(S): 9 INJECTION INTRAMUSCULAR; INTRAVENOUS; SUBCUTANEOUS at 13:37

## 2022-12-12 RX ADMIN — Medication 325 MILLIGRAM(S): at 11:10

## 2022-12-12 RX ADMIN — POLYETHYLENE GLYCOL 3350 17 GRAM(S): 17 POWDER, FOR SOLUTION ORAL at 11:13

## 2022-12-12 RX ADMIN — SENNA PLUS 2 TABLET(S): 8.6 TABLET ORAL at 21:59

## 2022-12-12 RX ADMIN — APIXABAN 2.5 MILLIGRAM(S): 2.5 TABLET, FILM COATED ORAL at 05:57

## 2022-12-12 RX ADMIN — SODIUM CHLORIDE 3 MILLILITER(S): 9 INJECTION INTRAMUSCULAR; INTRAVENOUS; SUBCUTANEOUS at 21:10

## 2022-12-12 RX ADMIN — Medication 40 MILLIGRAM(S): at 05:57

## 2022-12-12 RX ADMIN — APIXABAN 2.5 MILLIGRAM(S): 2.5 TABLET, FILM COATED ORAL at 17:39

## 2022-12-12 RX ADMIN — Medication 1.2 MILLIGRAM(S): at 17:39

## 2022-12-12 RX ADMIN — Medication 81 MILLIGRAM(S): at 11:11

## 2022-12-12 RX ADMIN — Medication 0.25 MILLIGRAM(S): at 21:58

## 2022-12-12 RX ADMIN — Medication 50 MILLIGRAM(S): at 05:58

## 2022-12-12 RX ADMIN — Medication 112 MICROGRAM(S): at 05:57

## 2022-12-12 RX ADMIN — Medication 500 MILLIGRAM(S): at 11:10

## 2022-12-12 RX ADMIN — Medication 50 MILLIGRAM(S): at 17:39

## 2022-12-12 RX ADMIN — SERTRALINE 100 MILLIGRAM(S): 25 TABLET, FILM COATED ORAL at 11:12

## 2022-12-12 RX ADMIN — SODIUM CHLORIDE 3 MILLILITER(S): 9 INJECTION INTRAMUSCULAR; INTRAVENOUS; SUBCUTANEOUS at 07:27

## 2022-12-12 NOTE — PHYSICAL THERAPY INITIAL EVALUATION ADULT - PERTINENT HX OF CURRENT PROBLEM, REHAB EVAL
Patient underwent CABG X 3, MVR(t), DEVONTE Clip on 11/27/22.  Patient was discharged to [facility] on 12/6 developed SOB, tachycardia, and copious drainage from CT site requiring readmission 12/8 now s/p Left pigtail catheter placed for a Left pleural effusion 12/9

## 2022-12-12 NOTE — PHYSICAL THERAPY INITIAL EVALUATION ADULT - ACTIVE RANGE OF MOTION EXAMINATION, REHAB EVAL
assessed during functional mobility, resistance not applied, within precautions/bilateral upper extremity Active ROM was WFL (within functional limits)/bilateral  lower extremity Active ROM was WFL (within functional limits)

## 2022-12-12 NOTE — PROGRESS NOTE ADULT - SUBJECTIVE AND OBJECTIVE BOX
Subjective:    T(C): 36.5 (12-12-22 @ 00:26), Max: 36.7 (12-11-22 @ 05:00)  HR: 69 (12-12-22 @ 00:26) (66 - 92)  BP: 110/71 (12-12-22 @ 00:26) (101/58 - 121/61)  ABP: --  ABP(mean): --  RR: 16 (12-12-22 @ 00:26) (16 - 18)  SpO2: 95% (12-12-22 @ 00:26) (95% - 100%)  Wt(kg): --  CVP(mm Hg): --  CO: --  CI: --  PA: --      12-11    139  |  100  |  44.2<H>  ----------------------------<  118<H>  4.5   |  28.0  |  1.10    Ca    8.4      11 Dec 2022 10:52  Mg     2.0     12-11    TPro  6.2<L>  /  Alb  3.0<L>  /  TBili  0.9  /  DBili  x   /  AST  36<H>  /  ALT  66<H>  /  AlkPhos  220<H>  12-11                               10.1   18.95 )-----------( 114      ( 11 Dec 2022 10:52 )             32.3                    CAPILLARY BLOOD GLUCOSE               CXR:    I&O's Detail    10 Dec 2022 07:01  -  11 Dec 2022 07:00  --------------------------------------------------------  IN:    Oral Fluid: 420 mL  Total IN: 420 mL    OUT:    Chest Tube (mL): 80 mL    Voided (mL): 200 mL  Total OUT: 280 mL    Total NET: 140 mL      11 Dec 2022 07:01  -  12 Dec 2022 02:03  --------------------------------------------------------  IN:    Oral Fluid: 120 mL  Total IN: 120 mL    OUT:    Chest Tube (mL): 380 mL    Voided (mL): 200 mL  Total OUT: 580 mL    Total NET: -460 mL        Drug Dosing Weight  Height (cm): 160 (23 Nov 2022 21:15)  Weight (kg): 72.4 (08 Dec 2022 22:00)  BMI (kg/m2): 28.3 (08 Dec 2022 22:00)  BSA (m2): 1.76 (08 Dec 2022 22:00)    MEDICATIONS  (STANDING):  apixaban 2.5 milliGRAM(s) Oral two times a day  ascorbic acid 500 milliGRAM(s) Oral daily  aspirin enteric coated 81 milliGRAM(s) Oral daily  atorvastatin 20 milliGRAM(s) Oral at bedtime  ferrous    sulfate 325 milliGRAM(s) Oral daily  levothyroxine 112 MICROGram(s) Oral daily  lidocaine   4% Patch 1 Patch Transdermal daily  megestrol Suspension 400 milliGRAM(s) Oral daily  metoprolol tartrate 50 milliGRAM(s) Oral two times a day  pantoprazole    Tablet 40 milliGRAM(s) Oral before breakfast  polyethylene glycol 3350 17 Gram(s) Oral daily  senna 2 Tablet(s) Oral at bedtime  sertraline 100 milliGRAM(s) Oral daily  sodium chloride 0.9% lock flush 3 milliLiter(s) IV Push every 8 hours  torsemide 40 milliGRAM(s) Oral daily    MEDICATIONS  (PRN):  acetaminophen     Tablet .. 650 milliGRAM(s) Oral every 6 hours PRN Mild Pain (1 - 3)  ALPRAZolam 0.25 milliGRAM(s) Oral daily PRN anxiety  oxyCODONE    IR 5 milliGRAM(s) Oral every 4 hours PRN Severe Pain (7 - 10)    Physical Exam  Gen: NAD  Neuro: AOx3 non focal speech clear and intact  Pulm: crackles L base, decreased at right base, no wheezing  CV: S1S2 RRR no murmurs  Abd: +BS soft NT ND  Extrem/MS: 1+ b/l LE edema no cyanosis  Incision(s): mid sternal inc C/D/I, stable no click, LLE inc C/D/I       Subjective: reports feeling anxiety postop denies CP, palpitations, SOB, cough, fever, chills, itchiness/rash, diaphoresis, vision changes, HA, dizziness/lightheadedness, numbness/tingling, abd pain, N/V     T(C): 36.5 (12-12-22 @ 00:26), Max: 36.7 (12-11-22 @ 05:00)  HR: 69 (12-12-22 @ 00:26) (66 - 92)  BP: 110/71 (12-12-22 @ 00:26) (101/58 - 121/61)  RR: 16 (12-12-22 @ 00:26) (16 - 18)  SpO2: 95% (12-12-22 @ 00:26) (95% - 100%)    12-11    139  |  100  |  44.2<H>  ----------------------------<  118<H>  4.5   |  28.0  |  1.10    Ca    8.4      11 Dec 2022 10:52  Mg     2.0     12-11    TPro  6.2<L>  /  Alb  3.0<L>  /  TBili  0.9  /  DBili  x   /  AST  36<H>  /  ALT  66<H>  /  AlkPhos  220<H>  12-11                               10.1   18.95 )-----------( 114      ( 11 Dec 2022 10:52 )             32.3        I&O's Detail    10 Dec 2022 07:01  -  11 Dec 2022 07:00  --------------------------------------------------------  IN:    Oral Fluid: 420 mL  Total IN: 420 mL    OUT:    Chest Tube (mL): 80 mL    Voided (mL): 200 mL  Total OUT: 280 mL    Total NET: 140 mL      11 Dec 2022 07:01  -  12 Dec 2022 02:03  --------------------------------------------------------  IN:    Oral Fluid: 120 mL  Total IN: 120 mL    OUT:    Chest Tube (mL): 380 mL    Voided (mL): 200 mL  Total OUT: 580 mL  Total NET: -460 mL    Drug Dosing Weight  Height (cm): 160 (23 Nov 2022 21:15)  Weight (kg): 72.4 (08 Dec 2022 22:00)  BMI (kg/m2): 28.3 (08 Dec 2022 22:00)  BSA (m2): 1.76 (08 Dec 2022 22:00)    MEDICATIONS  (STANDING):  apixaban 2.5 milliGRAM(s) Oral two times a day  ascorbic acid 500 milliGRAM(s) Oral daily  aspirin enteric coated 81 milliGRAM(s) Oral daily  atorvastatin 20 milliGRAM(s) Oral at bedtime  ferrous    sulfate 325 milliGRAM(s) Oral daily  levothyroxine 112 MICROGram(s) Oral daily  lidocaine   4% Patch 1 Patch Transdermal daily  megestrol Suspension 400 milliGRAM(s) Oral daily  metoprolol tartrate 50 milliGRAM(s) Oral two times a day  pantoprazole    Tablet 40 milliGRAM(s) Oral before breakfast  polyethylene glycol 3350 17 Gram(s) Oral daily  senna 2 Tablet(s) Oral at bedtime  sertraline 100 milliGRAM(s) Oral daily  sodium chloride 0.9% lock flush 3 milliLiter(s) IV Push every 8 hours  torsemide 40 milliGRAM(s) Oral daily    MEDICATIONS  (PRN):  acetaminophen     Tablet .. 650 milliGRAM(s) Oral every 6 hours PRN Mild Pain (1 - 3)  ALPRAZolam 0.25 milliGRAM(s) Oral daily PRN anxiety  oxyCODONE    IR 5 milliGRAM(s) Oral every 4 hours PRN Severe Pain (7 - 10)    Physical Exam  Gen: NAD  Neuro: AOx3 non focal speech clear and intact  Pulm: crackles L base, decreased at right base, no wheezing  CV: S1S2 RRR no murmurs  Abd: +BS soft NT ND  Extrem/MS: 1+ b/l LE edema no cyanosis  Incision(s): mid sternal inc C/D/I, stable no click, LLE inc C/D/I

## 2022-12-12 NOTE — PHYSICAL THERAPY INITIAL EVALUATION ADULT - ADDITIONAL COMMENTS
as per pt: resides in the private house with 2 steps no rail to enter, prior to surgery independent with all functional activities, (+) driving, (-) DME, Family available to assist as needed

## 2022-12-12 NOTE — PROGRESS NOTE ADULT - PROBLEM SELECTOR PLAN 4
TTE 12/5 EF 35-40%.  TTE 12/8 EF 30-35%.  Continue Torsemide 40mg QD for diuresis. Monitor strict I/Os. Supplement electrolytes to maintain K>4 and Mg>2.   Discuss starting GDMT including ACE/ARB as BP allows.

## 2022-12-12 NOTE — PHYSICAL THERAPY INITIAL EVALUATION ADULT - DIAGNOSIS, PT EVAL
Pericardial effusion Impaired Functional Mobility  due to Pericardial effusion, Tachycardia, SOB, CT site drainage.

## 2022-12-12 NOTE — PROGRESS NOTE ADULT - PROBLEM SELECTOR PLAN 1
Readmitted 12/8 for tachycardia, SOB, mediastinal chest tube site drainage  Reportedly, 1L serosang drainage from mediastinal chest tube site with ostomy bag placed over site.  Suspected pericardial effusion.   TTE on arrival showed EF 30-35%, trivial pericardial effusion   Patient remains Hemodynamically stable. Readmitted 12/8 for tachycardia, SOB, mediastinal chest tube site drainage  Reportedly, 1L serosang drainage from mediastinal chest tube site with ostomy bag placed over site.  Suspected pericardial effusion.   TTE on arrival showed EF 30-35%, trivial pericardial effusion   Patient remains Hemodynamically stable.    pt expresses she does not want to go back to rehab  also requesting psych consult

## 2022-12-13 LAB
ANION GAP SERPL CALC-SCNC: 13 MMOL/L — SIGNIFICANT CHANGE UP (ref 5–17)
BUN SERPL-MCNC: 44.3 MG/DL — HIGH (ref 8–20)
CALCIUM SERPL-MCNC: 8.4 MG/DL — SIGNIFICANT CHANGE UP (ref 8.4–10.5)
CHLORIDE SERPL-SCNC: 100 MMOL/L — SIGNIFICANT CHANGE UP (ref 96–108)
CO2 SERPL-SCNC: 28 MMOL/L — SIGNIFICANT CHANGE UP (ref 22–29)
CREAT SERPL-MCNC: 0.98 MG/DL — SIGNIFICANT CHANGE UP (ref 0.5–1.3)
EGFR: 58 ML/MIN/1.73M2 — LOW
GLUCOSE SERPL-MCNC: 90 MG/DL — SIGNIFICANT CHANGE UP (ref 70–99)
HCT VFR BLD CALC: 29.9 % — LOW (ref 34.5–45)
HGB BLD-MCNC: 9.4 G/DL — LOW (ref 11.5–15.5)
MAGNESIUM SERPL-MCNC: 1.9 MG/DL — SIGNIFICANT CHANGE UP (ref 1.6–2.6)
MCHC RBC-ENTMCNC: 30.5 PG — SIGNIFICANT CHANGE UP (ref 27–34)
MCHC RBC-ENTMCNC: 31.4 GM/DL — LOW (ref 32–36)
MCV RBC AUTO: 97.1 FL — SIGNIFICANT CHANGE UP (ref 80–100)
PLATELET # BLD AUTO: SIGNIFICANT CHANGE UP K/UL (ref 150–400)
POTASSIUM SERPL-MCNC: 3.8 MMOL/L — SIGNIFICANT CHANGE UP (ref 3.5–5.3)
POTASSIUM SERPL-SCNC: 3.8 MMOL/L — SIGNIFICANT CHANGE UP (ref 3.5–5.3)
RBC # BLD: 3.08 M/UL — LOW (ref 3.8–5.2)
RBC # FLD: 19.1 % — HIGH (ref 10.3–14.5)
SODIUM SERPL-SCNC: 141 MMOL/L — SIGNIFICANT CHANGE UP (ref 135–145)
WBC # BLD: 11.51 K/UL — HIGH (ref 3.8–10.5)
WBC # FLD AUTO: 11.51 K/UL — HIGH (ref 3.8–10.5)

## 2022-12-13 PROCEDURE — 99231 SBSQ HOSP IP/OBS SF/LOW 25: CPT | Mod: 24

## 2022-12-13 PROCEDURE — 71045 X-RAY EXAM CHEST 1 VIEW: CPT | Mod: 26

## 2022-12-13 PROCEDURE — 93010 ELECTROCARDIOGRAM REPORT: CPT

## 2022-12-13 RX ORDER — MAGNESIUM OXIDE 400 MG ORAL TABLET 241.3 MG
400 TABLET ORAL EVERY 4 HOURS
Refills: 0 | Status: COMPLETED | OUTPATIENT
Start: 2022-12-13 | End: 2022-12-13

## 2022-12-13 RX ORDER — POTASSIUM CHLORIDE 20 MEQ
20 PACKET (EA) ORAL DAILY
Refills: 0 | Status: DISCONTINUED | OUTPATIENT
Start: 2022-12-13 | End: 2022-12-17

## 2022-12-13 RX ADMIN — Medication 40 MILLIGRAM(S): at 05:16

## 2022-12-13 RX ADMIN — SODIUM CHLORIDE 3 MILLILITER(S): 9 INJECTION INTRAMUSCULAR; INTRAVENOUS; SUBCUTANEOUS at 13:38

## 2022-12-13 RX ADMIN — MAGNESIUM OXIDE 400 MG ORAL TABLET 400 MILLIGRAM(S): 241.3 TABLET ORAL at 08:33

## 2022-12-13 RX ADMIN — Medication 0.6 MILLIGRAM(S): at 18:03

## 2022-12-13 RX ADMIN — Medication 325 MILLIGRAM(S): at 11:24

## 2022-12-13 RX ADMIN — SENNA PLUS 2 TABLET(S): 8.6 TABLET ORAL at 22:32

## 2022-12-13 RX ADMIN — ATORVASTATIN CALCIUM 20 MILLIGRAM(S): 80 TABLET, FILM COATED ORAL at 22:32

## 2022-12-13 RX ADMIN — SERTRALINE 100 MILLIGRAM(S): 25 TABLET, FILM COATED ORAL at 11:23

## 2022-12-13 RX ADMIN — SODIUM CHLORIDE 3 MILLILITER(S): 9 INJECTION INTRAMUSCULAR; INTRAVENOUS; SUBCUTANEOUS at 22:33

## 2022-12-13 RX ADMIN — OXYCODONE HYDROCHLORIDE 5 MILLIGRAM(S): 5 TABLET ORAL at 07:04

## 2022-12-13 RX ADMIN — SODIUM CHLORIDE 3 MILLILITER(S): 9 INJECTION INTRAMUSCULAR; INTRAVENOUS; SUBCUTANEOUS at 05:16

## 2022-12-13 RX ADMIN — POLYETHYLENE GLYCOL 3350 17 GRAM(S): 17 POWDER, FOR SOLUTION ORAL at 11:24

## 2022-12-13 RX ADMIN — Medication 81 MILLIGRAM(S): at 11:23

## 2022-12-13 RX ADMIN — APIXABAN 2.5 MILLIGRAM(S): 2.5 TABLET, FILM COATED ORAL at 18:03

## 2022-12-13 RX ADMIN — Medication 20 MILLIEQUIVALENT(S): at 08:33

## 2022-12-13 RX ADMIN — Medication 50 MILLIGRAM(S): at 05:16

## 2022-12-13 RX ADMIN — Medication 50 MILLIGRAM(S): at 18:03

## 2022-12-13 RX ADMIN — Medication 112 MICROGRAM(S): at 05:15

## 2022-12-13 RX ADMIN — Medication 0.6 MILLIGRAM(S): at 05:15

## 2022-12-13 RX ADMIN — Medication 500 MILLIGRAM(S): at 11:24

## 2022-12-13 RX ADMIN — MAGNESIUM OXIDE 400 MG ORAL TABLET 400 MILLIGRAM(S): 241.3 TABLET ORAL at 11:24

## 2022-12-13 RX ADMIN — APIXABAN 2.5 MILLIGRAM(S): 2.5 TABLET, FILM COATED ORAL at 05:15

## 2022-12-13 NOTE — PROGRESS NOTE ADULT - SUBJECTIVE AND OBJECTIVE BOX
Subjective: no complaints, in better spirits today denies CP, palpitations, SOB, cough, fever, chills, itchiness/rash, diaphoresis, vision changes, HA, dizziness/lightheadedness, numbness/tingling, abd pain, N/V     T(C): 36.9 (12-12-22 @ 21:00), Max: 36.9 (12-12-22 @ 11:09)  HR: 80 (12-12-22 @ 21:00) (80 - 92)  BP: 103/66 (12-12-22 @ 21:00) (103/66 - 121/63)  RR: 18 (12-12-22 @ 21:00) (16 - 18)  SpO2: 96% (12-12-22 @ 21:00) (94% - 96%)    12-12    141  |  104  |  45.8<H>  ----------------------------<  86  3.9   |  25.0  |  0.95    Ca    8.2<L>      12 Dec 2022 05:16  Mg     2.0     12-12    TPro  5.5<L>  /  Alb  2.3<L>  /  TBili  0.8  /  DBili  x   /  AST  40<H>  /  ALT  60<H>  /  AlkPhos  186<H>  12-12                               9.7    13.71 )-----------( 119      ( 12 Dec 2022 05:16 )             30.7          I&O's Detail    11 Dec 2022 07:01  -  12 Dec 2022 07:00  --------------------------------------------------------  IN:    Oral Fluid: 300 mL  Total IN: 300 mL    OUT:    Chest Tube (mL): 415 mL    Voided (mL): 450 mL  Total OUT: 865 mL  Total NET: -565 mL    12 Dec 2022 07:01  -  13 Dec 2022 01:08  --------------------------------------------------------  IN:    Oral Fluid: 350 mL  Total IN: 350 mL    OUT:    Chest Tube (mL): 286 mL    Voided (mL): 150 mL  Total OUT: 436 mL  Total NET: -86 mL    Drug Dosing Weight  Height (cm): 160 (23 Nov 2022 21:15)  Weight (kg): 72.4 (08 Dec 2022 22:00)  BMI (kg/m2): 28.3 (08 Dec 2022 22:00)  BSA (m2): 1.76 (08 Dec 2022 22:00)    MEDICATIONS  (STANDING):  apixaban 2.5 milliGRAM(s) Oral two times a day  ascorbic acid 500 milliGRAM(s) Oral daily  aspirin enteric coated 81 milliGRAM(s) Oral daily  atorvastatin 20 milliGRAM(s) Oral at bedtime  colchicine 0.6 milliGRAM(s) Oral every 12 hours  ferrous    sulfate 325 milliGRAM(s) Oral daily  levothyroxine 112 MICROGram(s) Oral daily  lidocaine   4% Patch 1 Patch Transdermal daily  megestrol Suspension 400 milliGRAM(s) Oral daily  metoprolol tartrate 50 milliGRAM(s) Oral two times a day  pantoprazole    Tablet 40 milliGRAM(s) Oral before breakfast  polyethylene glycol 3350 17 Gram(s) Oral daily  senna 2 Tablet(s) Oral at bedtime  sertraline 100 milliGRAM(s) Oral daily  sodium chloride 0.9% lock flush 3 milliLiter(s) IV Push every 8 hours  torsemide 40 milliGRAM(s) Oral daily    MEDICATIONS  (PRN):  acetaminophen     Tablet .. 650 milliGRAM(s) Oral every 6 hours PRN Mild Pain (1 - 3)  ALPRAZolam 0.25 milliGRAM(s) Oral daily PRN anxiety  oxyCODONE    IR 5 milliGRAM(s) Oral every 4 hours PRN Severe Pain (7 - 10)    Physical Exam  Gen: NAD  Neuro: AOx3 non focal speech clear and intact  Pulm: crackles L base, decreased at right base, no wheezing  CV: S1S2 RRR no murmurs  Abd: +BS soft NT ND  Extrem/MS: 1+ b/l LE edema no cyanosis  Incision(s): mid sternal inc C/D/I, stable no click, LLE inc C/D/I

## 2022-12-13 NOTE — PROGRESS NOTE ADULT - PROBLEM SELECTOR PLAN 1
Readmitted 12/8 for tachycardia, SOB, mediastinal chest tube site drainage  Reportedly, 1L serosang drainage from mediastinal chest tube site with ostomy bag placed over site.  Suspected pericardial effusion.   TTE on arrival showed EF 30-35%, trivial pericardial effusion   Patient remains Hemodynamically stable.  PT now rec d/c to [facility]  pt amenable to returning to rehab likey end of week pending removal of L pigtail   plan to be d/w Dr. Ackerman in AM rounds

## 2022-12-14 LAB
ANION GAP SERPL CALC-SCNC: 12 MMOL/L — SIGNIFICANT CHANGE UP (ref 5–17)
ANION GAP SERPL CALC-SCNC: 13 MMOL/L — SIGNIFICANT CHANGE UP (ref 5–17)
BUN SERPL-MCNC: 38.8 MG/DL — HIGH (ref 8–20)
BUN SERPL-MCNC: 42.3 MG/DL — HIGH (ref 8–20)
CALCIUM SERPL-MCNC: 8.5 MG/DL — SIGNIFICANT CHANGE UP (ref 8.4–10.5)
CALCIUM SERPL-MCNC: 9 MG/DL — SIGNIFICANT CHANGE UP (ref 8.4–10.5)
CHLORIDE SERPL-SCNC: 100 MMOL/L — SIGNIFICANT CHANGE UP (ref 96–108)
CHLORIDE SERPL-SCNC: 103 MMOL/L — SIGNIFICANT CHANGE UP (ref 96–108)
CO2 SERPL-SCNC: 27 MMOL/L — SIGNIFICANT CHANGE UP (ref 22–29)
CO2 SERPL-SCNC: 29 MMOL/L — SIGNIFICANT CHANGE UP (ref 22–29)
CREAT SERPL-MCNC: 1.03 MG/DL — SIGNIFICANT CHANGE UP (ref 0.5–1.3)
CREAT SERPL-MCNC: 1.03 MG/DL — SIGNIFICANT CHANGE UP (ref 0.5–1.3)
EGFR: 54 ML/MIN/1.73M2 — LOW
EGFR: 54 ML/MIN/1.73M2 — LOW
GLUCOSE SERPL-MCNC: 93 MG/DL — SIGNIFICANT CHANGE UP (ref 70–99)
GLUCOSE SERPL-MCNC: 96 MG/DL — SIGNIFICANT CHANGE UP (ref 70–99)
HCT VFR BLD CALC: 31.2 % — LOW (ref 34.5–45)
HCT VFR BLD CALC: 32 % — LOW (ref 34.5–45)
HGB BLD-MCNC: 9.8 G/DL — LOW (ref 11.5–15.5)
HGB BLD-MCNC: 9.9 G/DL — LOW (ref 11.5–15.5)
MAGNESIUM SERPL-MCNC: 1.9 MG/DL — SIGNIFICANT CHANGE UP (ref 1.6–2.6)
MAGNESIUM SERPL-MCNC: 1.9 MG/DL — SIGNIFICANT CHANGE UP (ref 1.6–2.6)
MCHC RBC-ENTMCNC: 30.5 PG — SIGNIFICANT CHANGE UP (ref 27–34)
MCHC RBC-ENTMCNC: 30.5 PG — SIGNIFICANT CHANGE UP (ref 27–34)
MCHC RBC-ENTMCNC: 30.9 GM/DL — LOW (ref 32–36)
MCHC RBC-ENTMCNC: 31.4 GM/DL — LOW (ref 32–36)
MCV RBC AUTO: 97.2 FL — SIGNIFICANT CHANGE UP (ref 80–100)
MCV RBC AUTO: 98.5 FL — SIGNIFICANT CHANGE UP (ref 80–100)
PLATELET # BLD AUTO: 153 K/UL — SIGNIFICANT CHANGE UP (ref 150–400)
PLATELET # BLD AUTO: 156 K/UL — SIGNIFICANT CHANGE UP (ref 150–400)
POTASSIUM SERPL-MCNC: 4.3 MMOL/L — SIGNIFICANT CHANGE UP (ref 3.5–5.3)
POTASSIUM SERPL-MCNC: 4.9 MMOL/L — SIGNIFICANT CHANGE UP (ref 3.5–5.3)
POTASSIUM SERPL-SCNC: 4.3 MMOL/L — SIGNIFICANT CHANGE UP (ref 3.5–5.3)
POTASSIUM SERPL-SCNC: 4.9 MMOL/L — SIGNIFICANT CHANGE UP (ref 3.5–5.3)
RBC # BLD: 3.21 M/UL — LOW (ref 3.8–5.2)
RBC # BLD: 3.25 M/UL — LOW (ref 3.8–5.2)
RBC # FLD: 19 % — HIGH (ref 10.3–14.5)
RBC # FLD: 19.1 % — HIGH (ref 10.3–14.5)
SODIUM SERPL-SCNC: 141 MMOL/L — SIGNIFICANT CHANGE UP (ref 135–145)
SODIUM SERPL-SCNC: 143 MMOL/L — SIGNIFICANT CHANGE UP (ref 135–145)
WBC # BLD: 10.35 K/UL — SIGNIFICANT CHANGE UP (ref 3.8–10.5)
WBC # BLD: 9.62 K/UL — SIGNIFICANT CHANGE UP (ref 3.8–10.5)
WBC # FLD AUTO: 10.35 K/UL — SIGNIFICANT CHANGE UP (ref 3.8–10.5)
WBC # FLD AUTO: 9.62 K/UL — SIGNIFICANT CHANGE UP (ref 3.8–10.5)

## 2022-12-14 PROCEDURE — 99223 1ST HOSP IP/OBS HIGH 75: CPT

## 2022-12-14 PROCEDURE — 99024 POSTOP FOLLOW-UP VISIT: CPT

## 2022-12-14 PROCEDURE — 71045 X-RAY EXAM CHEST 1 VIEW: CPT | Mod: 26

## 2022-12-14 PROCEDURE — 93010 ELECTROCARDIOGRAM REPORT: CPT

## 2022-12-14 RX ORDER — DIGOXIN 250 MCG
250 TABLET ORAL ONCE
Refills: 0 | Status: DISCONTINUED | OUTPATIENT
Start: 2022-12-15 | End: 2022-12-15

## 2022-12-14 RX ORDER — DIGOXIN 250 MCG
500 TABLET ORAL ONCE
Refills: 0 | Status: COMPLETED | OUTPATIENT
Start: 2022-12-14 | End: 2022-12-14

## 2022-12-14 RX ORDER — DIGOXIN 250 MCG
125 TABLET ORAL DAILY
Refills: 0 | Status: DISCONTINUED | OUTPATIENT
Start: 2022-12-16 | End: 2022-12-19

## 2022-12-14 RX ORDER — DIGOXIN 250 MCG
250 TABLET ORAL ONCE
Refills: 0 | Status: COMPLETED | OUTPATIENT
Start: 2022-12-15 | End: 2022-12-15

## 2022-12-14 RX ORDER — MAGNESIUM SULFATE 500 MG/ML
2 VIAL (ML) INJECTION ONCE
Refills: 0 | Status: COMPLETED | OUTPATIENT
Start: 2022-12-14 | End: 2022-12-14

## 2022-12-14 RX ORDER — MAGNESIUM OXIDE 400 MG ORAL TABLET 241.3 MG
400 TABLET ORAL EVERY 4 HOURS
Refills: 0 | Status: COMPLETED | OUTPATIENT
Start: 2022-12-14 | End: 2022-12-14

## 2022-12-14 RX ADMIN — Medication 0.6 MILLIGRAM(S): at 17:02

## 2022-12-14 RX ADMIN — Medication 500 MILLIGRAM(S): at 11:07

## 2022-12-14 RX ADMIN — Medication 81 MILLIGRAM(S): at 11:07

## 2022-12-14 RX ADMIN — APIXABAN 2.5 MILLIGRAM(S): 2.5 TABLET, FILM COATED ORAL at 17:02

## 2022-12-14 RX ADMIN — Medication 0.6 MILLIGRAM(S): at 05:03

## 2022-12-14 RX ADMIN — APIXABAN 2.5 MILLIGRAM(S): 2.5 TABLET, FILM COATED ORAL at 05:05

## 2022-12-14 RX ADMIN — Medication 112 MICROGRAM(S): at 05:05

## 2022-12-14 RX ADMIN — Medication 50 MILLIGRAM(S): at 17:02

## 2022-12-14 RX ADMIN — MAGNESIUM OXIDE 400 MG ORAL TABLET 400 MILLIGRAM(S): 241.3 TABLET ORAL at 08:26

## 2022-12-14 RX ADMIN — Medication 325 MILLIGRAM(S): at 11:08

## 2022-12-14 RX ADMIN — Medication 20 MILLIEQUIVALENT(S): at 11:07

## 2022-12-14 RX ADMIN — Medication 25 GRAM(S): at 22:07

## 2022-12-14 RX ADMIN — SODIUM CHLORIDE 3 MILLILITER(S): 9 INJECTION INTRAMUSCULAR; INTRAVENOUS; SUBCUTANEOUS at 15:29

## 2022-12-14 RX ADMIN — Medication 40 MILLIGRAM(S): at 05:02

## 2022-12-14 RX ADMIN — SODIUM CHLORIDE 3 MILLILITER(S): 9 INJECTION INTRAMUSCULAR; INTRAVENOUS; SUBCUTANEOUS at 05:05

## 2022-12-14 RX ADMIN — SODIUM CHLORIDE 3 MILLILITER(S): 9 INJECTION INTRAMUSCULAR; INTRAVENOUS; SUBCUTANEOUS at 21:02

## 2022-12-14 RX ADMIN — ATORVASTATIN CALCIUM 20 MILLIGRAM(S): 80 TABLET, FILM COATED ORAL at 21:10

## 2022-12-14 RX ADMIN — Medication 500 MICROGRAM(S): at 22:28

## 2022-12-14 RX ADMIN — Medication 50 MILLIGRAM(S): at 05:04

## 2022-12-14 RX ADMIN — SERTRALINE 100 MILLIGRAM(S): 25 TABLET, FILM COATED ORAL at 11:07

## 2022-12-14 NOTE — PROGRESS NOTE ADULT - PROBLEM SELECTOR PLAN 1
Readmitted 12/8 for tachycardia, SOB, mediastinal chest tube site drainage  Reportedly, 1L serosang drainage from mediastinal chest tube site with ostomy bag placed over site.  Suspected pericardial effusion.   TTE on arrival showed EF 30-35%, trivial pericardial effusion   Patient remains Hemodynamically stable.  PT now rec d/c to [facility]  pt amenable to returning to rehab likey end of week pending removal of L pigtail   plan to be d/w Dr. Ackerman in AM rounds normal...

## 2022-12-14 NOTE — PROGRESS NOTE ADULT - SUBJECTIVE AND OBJECTIVE BOX
Subjective - patient seen and evaluated bedside. Sitting comfortably in chair. Admits to pain at chest tube site.  Denies CP, SOB, HA, dizziness, n/v/d    Review of Systems: negative x 10 systems except as noted above    Brief summary:  82yFemale s/p CABGx3, MVR, DEVONTE clip, readmitted with drainage from mediastina CT site. s/p LT pTC placement    Significant/[id number] events: continued drainage from LT PTC. colchicine started/       PAST MEDICAL & SURGICAL HISTORY:  Hypertension      Hyperlipidemia      Hypothyroid      Breast cancer      History of mitral valve prolapse      S/P mastectomy, left      S/P CABG x 3      S/P MVR (mitral valve replacement)            acetaminophen     Tablet .. 650 milliGRAM(s) Oral every 6 hours PRN  ALPRAZolam 0.25 milliGRAM(s) Oral daily PRN  apixaban 2.5 milliGRAM(s) Oral two times a day  ascorbic acid 500 milliGRAM(s) Oral daily  aspirin enteric coated 81 milliGRAM(s) Oral daily  atorvastatin 20 milliGRAM(s) Oral at bedtime  colchicine 0.6 milliGRAM(s) Oral every 12 hours  ferrous    sulfate 325 milliGRAM(s) Oral daily  levothyroxine 112 MICROGram(s) Oral daily  lidocaine   4% Patch 1 Patch Transdermal daily  megestrol Suspension 400 milliGRAM(s) Oral daily  metoprolol tartrate 50 milliGRAM(s) Oral two times a day  oxyCODONE    IR 5 milliGRAM(s) Oral every 4 hours PRN  pantoprazole    Tablet 40 milliGRAM(s) Oral before breakfast  polyethylene glycol 3350 17 Gram(s) Oral daily  potassium chloride    Tablet ER 20 milliEquivalent(s) Oral daily  senna 2 Tablet(s) Oral at bedtime  sertraline 100 milliGRAM(s) Oral daily  sodium chloride 0.9% lock flush 3 milliLiter(s) IV Push every 8 hours  torsemide 40 milliGRAM(s) Oral daily  MEDICATIONS  (PRN):  acetaminophen     Tablet .. 650 milliGRAM(s) Oral every 6 hours PRN Mild Pain (1 - 3)  ALPRAZolam 0.25 milliGRAM(s) Oral daily PRN anxiety  oxyCODONE    IR 5 milliGRAM(s) Oral every 4 hours PRN Severe Pain (7 - 10)      Daily     Daily Weight in k.5 (13 Dec 2022 04:00)                              9.4    11.51 )-----------( Clumped    ( 13 Dec 2022 05:02 )             29.9       141  |  100  |  44.3<H>  ----------------------------<  90  3.8   |  28.0  |  0.98    Ca    8.4      13 Dec 2022 05:02  Mg     1.9         TPro  5.5<L>  /  Alb  2.3<L>  /  TBili  0.8  /  DBili  x   /  AST  40<H>  /  ALT  60<H>  /  AlkPhos  186<H>              Objective:  T(C): 36.7 (22 @ 00:27), Max: 36.9 (22 @ 05:00)  HR: 83 (22 @ 00:27) (64 - 94)  BP: 102/62 (22 @ 00:27) (101/63 - 123/73)  RR: 17 (22 @ 00:27) (17 - 18)  SpO2: 99% (22 @ 00:27) (97% - 100%)  Wt(kg): --CAPILLARY BLOOD GLUCOSE      I&O's Summary    12 Dec 2022 07:  -  13 Dec 2022 07:00  --------------------------------------------------------  IN: 350 mL / OUT: 436 mL / NET: -86 mL    13 Dec 2022 07:01  -  14 Dec 2022 00:43  --------------------------------------------------------  IN: 350 mL / OUT: 920 mL / NET: -570 mL        Physical Exam  General: NAD  Neuro: A+O x 3, non-focal, speech clear and intact  Psych: Appropriate affect  HEENT:  NCAT, No conjuctival edema or icterus, no thrush.  Pulm: CTA, equal bilaterally  CV: RRR,  +S1S2  Abd: soft, NT, ND, +BS  Ext: +DP Pulses b/l, no edema  Skin: Warm, dry, intact  Inc: MSI C/D/I/stable open to air, LE vein harvest site C/D/I  Chest tubes: LT PTC to WS, draining appropriately, no AL         Imaging:    < from: Xray Chest 1 View- PORTABLE-Routine (Xray Chest 1 View- PORTABLE-Routine in AM.) (22 @ 06:14) >    IMPRESSION:  Small pleural effusions. Left apical pneumothorax as noted.        Thank you for the courtesy of this referral.    --- End of Report ---      < end of copied text >

## 2022-12-14 NOTE — CONSULT NOTE ADULT - SUBJECTIVE AND OBJECTIVE BOX
82yF was readmitted on 12-08 from [facility] after developed tachycardia, SOB, and high output serosanguinous drainage from prior mediastinal CT site. Patient was at [facility] CABG X 3, MVR(t), DEVONTE Clip on 11/27/22. Patient is now s/p chest tube.     Imaging performed:  CXR 12/13 - Small pleural effusions. Left apical pneumothorax as noted.    REVIEW OF SYSTEMS  Constitutional - No fever, No weight loss, No fatigue  HEENT - No eye pain, No visual disturbances, No difficulty hearing, No tinnitus, No vertigo, No neck pain  Respiratory - No cough, No wheezing, No shortness of breath  Cardiovascular - No chest pain, No palpitations  Gastrointestinal - No abdominal pain, No nausea, No vomiting, No diarrhea, No constipation  Genitourinary - No dysuria, No frequency, No hematuria, No incontinence  Neurological - No headaches, No memory loss, No loss of strength, No numbness, No tremors  Skin - No itching, No rashes, No lesions   Endocrine - No temperature intolerance  Musculoskeletal - No joint pain, No joint swelling, No muscle pain  Psychiatric - No depression, No anxiety    VITALS  T(C): 36.8 (12-14-22 @ 08:10), Max: 36.9 (12-13-22 @ 11:55)  HR: 70 (12-14-22 @ 08:10) (66 - 95)  BP: 107/73 (12-14-22 @ 08:10) (101/77 - 123/73)  RR: 19 (12-14-22 @ 08:10) (17 - 19)  SpO2: 98% (12-14-22 @ 08:10) (97% - 100%)  Wt(kg): --    PAST MEDICAL & SURGICAL HISTORY  Hypertension    Hyperlipidemia    Hypothyroid    Breast cancer    History of mitral valve prolapse    S/P mastectomy, left    S/P CABG (coronary artery bypass graft)    S/P CABG x 3    S/P MVR (mitral valve replacement)        FUNCTIONAL HISTORY  Lives alone, but plans to live with daughter, 2 MARY ANN   Independent    CURRENT FUNCTIONAL STATUS  12/12 PT  Bed Mobility: Scooting/Bridging:     · Level of [place]	minimum assist (75% patients effort)    Bed Mobility: Supine to Sit:     · Level of [place]	minimum assist (75% patients effort)    Bed Mobility Analysis:     · Bed Mobility Limitations	bed mobility as per RN (pt received sitting in the recliner)    Transfer: Bed to Chair:     Transfer Skill: Bed to Chair   · Level of [place]	chair to the commode and back with RW min A    Transfer: Sit to Stand:     · Level of [place]	minimum assist (75% patients effort)  · Physical Assist/Nonphysical Assist	1 person assist; nonverbal cues (demo/gestures); verbal cues  · Weight-Bearing Restrictions	full weight-bearing; Maurice LE  · Assistive Device	rolling walker    Transfer: Stand to Sit:     · Level of [place]	minimum assist (75% patients effort)  · Physical Assist/Nonphysical Assist	1 person assist; nonverbal cues (demo/gestures); verbal cues  · Weight-Bearing Restrictions	full weight-bearing; Maurice LE  · Assistive Device	rolling walker    Sit/Stand Transfer Safety Analysis:     · Transfer Safety Concerns Noted	decreased sequencing ability; decreased weight-shifting ability; in sagittal plane  · Impairments Contributing to Impaired Transfers	decreased flexibility; impaired balance; decreased strength    Gait Skills:     · Level of [place]	minimum assist (75% patients effort)  · Physical Assist/Nonphysical Assist	verbal cues; nonverbal cues (demo/gestures); 1 person assist  · Weight-Bearing Restrictions	full weight-bearing; Maurice LE  · Assistive Device	rolling walker  · Gait Distance	5 feet; 10 feet    Gait Analysis:     · Gait Pattern Used	2-point gait   · Gait Deviations Noted	decreased valentin; decreased velocity of limb motion; decreased step length  · Impairments Contributing to Gait Deviations	impaired balance; decreased flexibility; decreased strength; narrow base of support      SOCIAL HISTORY - as per documentation/history  Smoking - None  EtOH - None  Drugs - None    FAMILY HISTORY   Family history of systemic lupus erythematosus (Child)    Family history of CVA (Grandparent)        ALLERGIES  amiodarone (Hives)  iodinated radiocontrast agents (Vomiting; Headache)      MEDICATIONS   acetaminophen     Tablet .. 650 milliGRAM(s) Oral every 6 hours PRN  ALPRAZolam 0.25 milliGRAM(s) Oral daily PRN  apixaban 2.5 milliGRAM(s) Oral two times a day  ascorbic acid 500 milliGRAM(s) Oral daily  aspirin enteric coated 81 milliGRAM(s) Oral daily  atorvastatin 20 milliGRAM(s) Oral at bedtime  colchicine 0.6 milliGRAM(s) Oral every 12 hours  ferrous    sulfate 325 milliGRAM(s) Oral daily  levothyroxine 112 MICROGram(s) Oral daily  lidocaine   4% Patch 1 Patch Transdermal daily  magnesium oxide 400 milliGRAM(s) Oral every 4 hours  megestrol Suspension 400 milliGRAM(s) Oral daily  metoprolol tartrate 50 milliGRAM(s) Oral two times a day  oxyCODONE    IR 5 milliGRAM(s) Oral every 4 hours PRN  pantoprazole    Tablet 40 milliGRAM(s) Oral before breakfast  polyethylene glycol 3350 17 Gram(s) Oral daily  potassium chloride    Tablet ER 20 milliEquivalent(s) Oral daily  senna 2 Tablet(s) Oral at bedtime  sertraline 100 milliGRAM(s) Oral daily  sodium chloride 0.9% lock flush 3 milliLiter(s) IV Push every 8 hours  torsemide 40 milliGRAM(s) Oral daily      RECENT LABS - Reviewed  CBC Full  -  ( 14 Dec 2022 05:20 )  WBC Count : 10.35 K/uL  RBC Count : 3.25 M/uL  Hemoglobin : 9.9 g/dL  Hematocrit : 32.0 %  Platelet Count - Automated : 153 K/uL  Mean Cell Volume : 98.5 fl  Mean Cell Hemoglobin : 30.5 pg  Mean Cell Hemoglobin Concentration : 30.9 gm/dL  Auto Neutrophil # : x  Auto Lymphocyte # : x  Auto Monocyte # : x  Auto Eosinophil # : x  Auto Basophil # : x  Auto Neutrophil % : x  Auto Lymphocyte % : x  Auto Monocyte % : x  Auto Eosinophil % : x  Auto Basophil % : x    12-14    141  |  100  |  42.3<H>  ----------------------------<  93  4.9   |  29.0  |  1.03    Ca    9.0      14 Dec 2022 05:20  Mg     1.9     12-14          ----------------------------------------------------------------------------------------  PHYSICAL EXAM  Constitutional - NAD, Comfortable  HEENT - NCAT, EOMI  Neck - Supple, No limited ROM  Chest - Breathing comfortably, No wheezing  Cardiovascular - S1S2   Abdomen - Soft   Extremities - No C/C/E, No calf tenderness   Neurologic Exam -                    Cognitive - AAO to self, place, date, year, situation     Communication - Fluent, No dysarthria     Cranial Nerves - CN 2-12 intact     FUNCTIONAL MOTOR EXAM - No focal deficits                    LEFT    UE - ShAB 5/5, EF 5/5, EE 5/5, WE 5/5,  5/5                    RIGHT UE - ShAB 5/5, EF 5/5, EE 5/5, WE 5/5,  5/5                    LEFT    LE - HF 5/5, KE 5/5, DF 5/5, PF 5/5                    RIGHT LE - HF 5/5, KE 5/5, DF 5/5, PF 5/5        Sensory - Intact to LT     Reflexes - DTR Intact, No primitive reflexive     Coordination - FTN intact     OculoVestibular - No saccades, No nystagmus, VOR         Balance - WNL Static  Psychiatric - Mood stable, Affect WNL  ----------------------------------------------------------------------------------------  ASSESSMENT/PLAN  82yFemale with functional deficits after developing chest wound drainage from previous surgical admission  Cardiac/CABG/MV - Eliquis, ASA, Demadex, Lopressor, Lipitor   Mood - Xanax, Zoloft   Pain - Tylenol, Lidoderm, Oxycodone  DVT PPX - SCDs  Rehab - Patient with limited functional progress since last admission despite AR. Agree with PT, patient more appropriate for JOHN.     82yF was readmitted on 12-08 from [facility] (about 2 days) after developed tachycardia, SOB, and high output serosanguinous drainage from prior mediastinal CT site. Patient was at [facility] CABG X 3, MVR(t), DEVONTE Clip on 11/27/22. Patient is now s/p chest tube.     Imaging performed:  CXR 12/13 - Small pleural effusions. Left apical pneumothorax as noted.    Patient is motivated.  As per RN, was able to walk to the bathroom and back.  Is fatigued.    REVIEW OF SYSTEMS  Constitutional - No fever, No weight loss, +fatigue  HEENT - No eye pain, No visual disturbances, No difficulty hearing, No tinnitus, No vertigo, No neck pain  Respiratory - No cough, No wheezing, +shortness of breath  Cardiovascular - +chest pain, No palpitations  Gastrointestinal - No abdominal pain, No nausea, No vomiting, No diarrhea, No constipation  Genitourinary - No dysuria, No frequency, No hematuria, No incontinence  Neurological - No headaches, No memory loss, +loss of strength, No numbness, No tremors  Skin - No itching, No rashes, No lesions   Endocrine - No temperature intolerance  Musculoskeletal - +joint pain, No joint swelling, No muscle pain  Psychiatric - +depression, +anxiety    VITALS  T(C): 36.8 (12-14-22 @ 08:10), Max: 36.9 (12-13-22 @ 11:55)  HR: 70 (12-14-22 @ 08:10) (66 - 95)  BP: 107/73 (12-14-22 @ 08:10) (101/77 - 123/73)  RR: 19 (12-14-22 @ 08:10) (17 - 19)  SpO2: 98% (12-14-22 @ 08:10) (97% - 100%)  Wt(kg): --    PAST MEDICAL & SURGICAL HISTORY  Hypertension    Hyperlipidemia    Hypothyroid    Breast cancer    History of mitral valve prolapse    S/P mastectomy, left    S/P CABG (coronary artery bypass graft)    S/P CABG x 3    S/P MVR (mitral valve replacement)        FUNCTIONAL HISTORY  Lives alone, but plans to live with daughter, 2 MARY ANN   Independent    CURRENT FUNCTIONAL STATUS  12/12 PT  Bed Mobility: Scooting/Bridging:     · Level of [place]	minimum assist (75% patients effort)    Bed Mobility: Supine to Sit:     · Level of [place]	minimum assist (75% patients effort)    Bed Mobility Analysis:     · Bed Mobility Limitations	bed mobility as per RN (pt received sitting in the recliner)    Transfer: Bed to Chair:     Transfer Skill: Bed to Chair   · Level of [place]	chair to the commode and back with RW min A    Transfer: Sit to Stand:     · Level of [place]	minimum assist (75% patients effort)  · Physical Assist/Nonphysical Assist	1 person assist; nonverbal cues (demo/gestures); verbal cues  · Weight-Bearing Restrictions	full weight-bearing; Maurice LE  · Assistive Device	rolling walker    Transfer: Stand to Sit:     · Level of [place]	minimum assist (75% patients effort)  · Physical Assist/Nonphysical Assist	1 person assist; nonverbal cues (demo/gestures); verbal cues  · Weight-Bearing Restrictions	full weight-bearing; Maurice LE  · Assistive Device	rolling walker    Sit/Stand Transfer Safety Analysis:     · Transfer Safety Concerns Noted	decreased sequencing ability; decreased weight-shifting ability; in sagittal plane  · Impairments Contributing to Impaired Transfers	decreased flexibility; impaired balance; decreased strength    Gait Skills:     · Level of [place]	minimum assist (75% patients effort)  · Physical Assist/Nonphysical Assist	verbal cues; nonverbal cues (demo/gestures); 1 person assist  · Weight-Bearing Restrictions	full weight-bearing; Maurice LE  · Assistive Device	rolling walker  · Gait Distance	5 feet; 10 feet    Gait Analysis:     · Gait Pattern Used	2-point gait   · Gait Deviations Noted	decreased valentin; decreased velocity of limb motion; decreased step length  · Impairments Contributing to Gait Deviations	impaired balance; decreased flexibility; decreased strength; narrow base of support      SOCIAL HISTORY - as per documentation/history  Smoking - None  EtOH - None  Drugs - None    FAMILY HISTORY   Family history of systemic lupus erythematosus (Child)    Family history of CVA (Grandparent)        ALLERGIES  amiodarone (Hives)  iodinated radiocontrast agents (Vomiting; Headache)      MEDICATIONS   acetaminophen     Tablet .. 650 milliGRAM(s) Oral every 6 hours PRN  ALPRAZolam 0.25 milliGRAM(s) Oral daily PRN  apixaban 2.5 milliGRAM(s) Oral two times a day  ascorbic acid 500 milliGRAM(s) Oral daily  aspirin enteric coated 81 milliGRAM(s) Oral daily  atorvastatin 20 milliGRAM(s) Oral at bedtime  colchicine 0.6 milliGRAM(s) Oral every 12 hours  ferrous    sulfate 325 milliGRAM(s) Oral daily  levothyroxine 112 MICROGram(s) Oral daily  lidocaine   4% Patch 1 Patch Transdermal daily  magnesium oxide 400 milliGRAM(s) Oral every 4 hours  megestrol Suspension 400 milliGRAM(s) Oral daily  metoprolol tartrate 50 milliGRAM(s) Oral two times a day  oxyCODONE    IR 5 milliGRAM(s) Oral every 4 hours PRN  pantoprazole    Tablet 40 milliGRAM(s) Oral before breakfast  polyethylene glycol 3350 17 Gram(s) Oral daily  potassium chloride    Tablet ER 20 milliEquivalent(s) Oral daily  senna 2 Tablet(s) Oral at bedtime  sertraline 100 milliGRAM(s) Oral daily  sodium chloride 0.9% lock flush 3 milliLiter(s) IV Push every 8 hours  torsemide 40 milliGRAM(s) Oral daily      RECENT LABS - Reviewed  CBC Full  -  ( 14 Dec 2022 05:20 )  WBC Count : 10.35 K/uL  RBC Count : 3.25 M/uL  Hemoglobin : 9.9 g/dL  Hematocrit : 32.0 %  Platelet Count - Automated : 153 K/uL  Mean Cell Volume : 98.5 fl  Mean Cell Hemoglobin : 30.5 pg  Mean Cell Hemoglobin Concentration : 30.9 gm/dL  Auto Neutrophil # : x  Auto Lymphocyte # : x  Auto Monocyte # : x  Auto Eosinophil # : x  Auto Basophil # : x  Auto Neutrophil % : x  Auto Lymphocyte % : x  Auto Monocyte % : x  Auto Eosinophil % : x  Auto Basophil % : x    12-14    141  |  100  |  42.3<H>  ----------------------------<  93  4.9   |  29.0  |  1.03    Ca    9.0      14 Dec 2022 05:20  Mg     1.9     12-14          ----------------------------------------------------------------------------------------  PHYSICAL EXAM  Constitutional - NAD, Comfortable  HEENT - NCAT, EOMI  Neck - Supple, No limited ROM  Chest - Increased work of breathing, No wheezing  Cardiovascular - S1S2   Abdomen - Soft   Extremities - No C/C/E, No calf tenderness   Neurologic Exam -                    Cognitive - AAO to self, place, date, year, situation     FUNCTIONAL MOTOR EXAM - No focal deficits     Sensory - Intact to LT  Psychiatric - Mldly anxious/depressed  ----------------------------------------------------------------------------------------  ASSESSMENT/PLAN  82yFemale with functional deficits after developing chest wound drainage from previous surgical admission  Cardiac/CABG/MV - Eliquis, ASA, Demadex, Lopressor, Lipitor   Mood - Xanax, Zoloft   Pain - Tylenol, Lidoderm, Oxycodone  DVT PPX - SCDs  Rehab - Patient was unable to complete her [facility] course. Recommend patient returns to [facility] when medically/surgically cleared.     Will continue to follow. Rehab recommendations are dependent on how functional progress changes as well as how patient continues to participate and tolerate therapeutic interventions, which may change. Recommend ongoing mobilization by staff to maintain cardiopulmonary function and prevention of secondary complications related to debility. Discussed with rehab team.

## 2022-12-15 DIAGNOSIS — Z29.9 ENCOUNTER FOR PROPHYLACTIC MEASURES, UNSPECIFIED: ICD-10-CM

## 2022-12-15 LAB
ANION GAP SERPL CALC-SCNC: 14 MMOL/L — SIGNIFICANT CHANGE UP (ref 5–17)
BUN SERPL-MCNC: 39.5 MG/DL — HIGH (ref 8–20)
CALCIUM SERPL-MCNC: 8.7 MG/DL — SIGNIFICANT CHANGE UP (ref 8.4–10.5)
CHLORIDE SERPL-SCNC: 103 MMOL/L — SIGNIFICANT CHANGE UP (ref 96–108)
CO2 SERPL-SCNC: 25 MMOL/L — SIGNIFICANT CHANGE UP (ref 22–29)
CREAT SERPL-MCNC: 1.12 MG/DL — SIGNIFICANT CHANGE UP (ref 0.5–1.3)
EGFR: 49 ML/MIN/1.73M2 — LOW
GLUCOSE SERPL-MCNC: 87 MG/DL — SIGNIFICANT CHANGE UP (ref 70–99)
HCT VFR BLD CALC: 32.9 % — LOW (ref 34.5–45)
HGB BLD-MCNC: 10.2 G/DL — LOW (ref 11.5–15.5)
MAGNESIUM SERPL-MCNC: 2.4 MG/DL — SIGNIFICANT CHANGE UP (ref 1.8–2.6)
MCHC RBC-ENTMCNC: 30.6 PG — SIGNIFICANT CHANGE UP (ref 27–34)
MCHC RBC-ENTMCNC: 31 GM/DL — LOW (ref 32–36)
MCV RBC AUTO: 98.8 FL — SIGNIFICANT CHANGE UP (ref 80–100)
PLATELET # BLD AUTO: 113 K/UL — LOW (ref 150–400)
POTASSIUM SERPL-MCNC: 4.6 MMOL/L — SIGNIFICANT CHANGE UP (ref 3.5–5.3)
POTASSIUM SERPL-SCNC: 4.6 MMOL/L — SIGNIFICANT CHANGE UP (ref 3.5–5.3)
RBC # BLD: 3.33 M/UL — LOW (ref 3.8–5.2)
RBC # FLD: 19.1 % — HIGH (ref 10.3–14.5)
SODIUM SERPL-SCNC: 141 MMOL/L — SIGNIFICANT CHANGE UP (ref 135–145)
WBC # BLD: 7.45 K/UL — SIGNIFICANT CHANGE UP (ref 3.8–10.5)
WBC # FLD AUTO: 7.45 K/UL — SIGNIFICANT CHANGE UP (ref 3.8–10.5)

## 2022-12-15 PROCEDURE — 99024 POSTOP FOLLOW-UP VISIT: CPT

## 2022-12-15 PROCEDURE — 71045 X-RAY EXAM CHEST 1 VIEW: CPT | Mod: 26

## 2022-12-15 PROCEDURE — 99233 SBSQ HOSP IP/OBS HIGH 50: CPT

## 2022-12-15 RX ADMIN — SODIUM CHLORIDE 3 MILLILITER(S): 9 INJECTION INTRAMUSCULAR; INTRAVENOUS; SUBCUTANEOUS at 13:10

## 2022-12-15 RX ADMIN — POLYETHYLENE GLYCOL 3350 17 GRAM(S): 17 POWDER, FOR SOLUTION ORAL at 11:52

## 2022-12-15 RX ADMIN — ATORVASTATIN CALCIUM 20 MILLIGRAM(S): 80 TABLET, FILM COATED ORAL at 21:06

## 2022-12-15 RX ADMIN — Medication 81 MILLIGRAM(S): at 11:54

## 2022-12-15 RX ADMIN — Medication 325 MILLIGRAM(S): at 11:53

## 2022-12-15 RX ADMIN — Medication 0.6 MILLIGRAM(S): at 18:03

## 2022-12-15 RX ADMIN — Medication 20 MILLIEQUIVALENT(S): at 11:53

## 2022-12-15 RX ADMIN — SERTRALINE 100 MILLIGRAM(S): 25 TABLET, FILM COATED ORAL at 11:53

## 2022-12-15 RX ADMIN — Medication 40 MILLIGRAM(S): at 05:43

## 2022-12-15 RX ADMIN — SODIUM CHLORIDE 3 MILLILITER(S): 9 INJECTION INTRAMUSCULAR; INTRAVENOUS; SUBCUTANEOUS at 04:50

## 2022-12-15 RX ADMIN — Medication 112 MICROGRAM(S): at 05:42

## 2022-12-15 RX ADMIN — APIXABAN 2.5 MILLIGRAM(S): 2.5 TABLET, FILM COATED ORAL at 05:42

## 2022-12-15 RX ADMIN — APIXABAN 2.5 MILLIGRAM(S): 2.5 TABLET, FILM COATED ORAL at 18:02

## 2022-12-15 RX ADMIN — Medication 0.6 MILLIGRAM(S): at 05:43

## 2022-12-15 RX ADMIN — PANTOPRAZOLE SODIUM 40 MILLIGRAM(S): 20 TABLET, DELAYED RELEASE ORAL at 05:42

## 2022-12-15 RX ADMIN — Medication 24 MILLIGRAM(S): at 11:52

## 2022-12-15 RX ADMIN — Medication 500 MILLIGRAM(S): at 11:57

## 2022-12-15 RX ADMIN — SODIUM CHLORIDE 3 MILLILITER(S): 9 INJECTION INTRAMUSCULAR; INTRAVENOUS; SUBCUTANEOUS at 21:43

## 2022-12-15 RX ADMIN — SENNA PLUS 2 TABLET(S): 8.6 TABLET ORAL at 21:06

## 2022-12-15 RX ADMIN — Medication 50 MILLIGRAM(S): at 18:02

## 2022-12-15 RX ADMIN — Medication 250 MICROGRAM(S): at 05:42

## 2022-12-15 RX ADMIN — Medication 50 MILLIGRAM(S): at 05:42

## 2022-12-15 NOTE — PROGRESS NOTE ADULT - PROBLEM SELECTOR PLAN 3
s/p CABG X 3, MVR(t), DEVONTE Clip on 11/27/22.  Continue ASA, Statin, and Lopressor as tolerated by HR and BP.

## 2022-12-15 NOTE — DIETITIAN INITIAL EVALUATION ADULT - NSFNSGIIOFT_GEN_A_CORE
12-14-22 @ 07:01  -  12-15-22 @ 07:00  --------------------------------------------------------  OUT:    Chest Tube (mL): 150 mL  Total OUT: 150 mL    Total NET: -150 mL      12-15-22 @ 07:01  -  12-15-22 @ 12:59  --------------------------------------------------------  OUT:    Chest Tube (mL): 40 mL  Total OUT: 40 mL    Total NET: -40 mL

## 2022-12-15 NOTE — PROGRESS NOTE ADULT - PROBLEM SELECTOR PLAN 5
Continue Eliquis 2.5BID for MVR and Afib.  Continue Lopressor as tolerated by HR and BP.  Dig started 12/14 as per Dr. Ackerman for recurrent AGNIESZKA on telemetry.

## 2022-12-15 NOTE — DIETITIAN INITIAL EVALUATION ADULT - OTHER INFO
Pt with h/o CAD, MVP, LBBB, Atrial Fibrillation, HTN, HLD, Hypothyroidism, Breast CA s/p b/l mastectomy, originally admitted to [facility] after experiencing acute onset chest pain, found to have NSTEMI, s/p cardiac cath that revealed triple vessel CAD with IABP placed and TTE showed severe MR. Patient transferred to [facility] ICU for surgical evaluation and medical management on 11/23. Patient underwent CABG X 3, MVR(t), DEVONTE Clip on 11/27/22. IABP removed 11/28. Postoperative course significant for cardiogenic shock requiring Primacor, ABLA, CHAVO. Patient was discharged to [facility] on 12/6. Now readmitted 12/8 with tachycardia, SOB, and high output serosanguinous drainage from prior mediastinal CT site. Left pigtail catheter placed for a Left pleural effusion 12/9.

## 2022-12-15 NOTE — DIETITIAN INITIAL EVALUATION ADULT - ORAL INTAKE PTA/DIET HISTORY
Pt reports fair po intake at meals; states appetite is starting to improve since admission.  Pt states poor po intake prior to admission due to abdominal pain with 10lb unintentional wt loss x 3 months.  Pt also vegetarian and states she has not been consuming enough protein at meals, but is now making more of an effort to include.  Protein food choices reviewed.  Pt receptive and verbalized understanding.  RD to remain available.

## 2022-12-15 NOTE — DIETITIAN INITIAL EVALUATION ADULT - PERTINENT MEDS FT
MEDICATIONS  (STANDING):  apixaban 2.5 milliGRAM(s) Oral two times a day  ascorbic acid 500 milliGRAM(s) Oral daily  aspirin enteric coated 81 milliGRAM(s) Oral daily  atorvastatin 20 milliGRAM(s) Oral at bedtime  colchicine 0.6 milliGRAM(s) Oral every 12 hours  ferrous    sulfate 325 milliGRAM(s) Oral daily  levothyroxine 112 MICROGram(s) Oral daily  lidocaine   4% Patch 1 Patch Transdermal daily  megestrol Suspension 400 milliGRAM(s) Oral daily  methylPREDNISolone   Oral   metoprolol tartrate 50 milliGRAM(s) Oral two times a day  pantoprazole    Tablet 40 milliGRAM(s) Oral before breakfast  polyethylene glycol 3350 17 Gram(s) Oral daily  potassium chloride    Tablet ER 20 milliEquivalent(s) Oral daily  senna 2 Tablet(s) Oral at bedtime  sertraline 100 milliGRAM(s) Oral daily  sodium chloride 0.9% lock flush 3 milliLiter(s) IV Push every 8 hours  torsemide 40 milliGRAM(s) Oral daily    MEDICATIONS  (PRN):  acetaminophen     Tablet .. 650 milliGRAM(s) Oral every 6 hours PRN Mild Pain (1 - 3)  ALPRAZolam 0.25 milliGRAM(s) Oral daily PRN anxiety  oxyCODONE    IR 5 milliGRAM(s) Oral every 4 hours PRN Severe Pain (7 - 10)

## 2022-12-15 NOTE — DIETITIAN INITIAL EVALUATION ADULT - PERTINENT LABORATORY DATA
12-15    141  |  103  |  39.5<H>  ----------------------------<  87  4.6   |  25.0  |  1.12    Ca    8.7      15 Dec 2022 04:59  Mg     2.4     12-15    A1C with Estimated Average Glucose Result: 5.4 % (11-24-22 @ 05:00)

## 2022-12-15 NOTE — PROGRESS NOTE ADULT - SUBJECTIVE AND OBJECTIVE BOX
Patient feeling more positive and remains motivated.  Did have anxiety about having AFIB.   Continues to have chest tube.     REVIEW OF SYSTEMS  Constitutional - No fever,  +fatigue  HEENT - No vertigo, No neck pain  Neurological - No headaches, No memory loss, +loss of strength, No numbness, No tremors  Musculoskeletal - +joint pain, No joint swelling, No muscle pain  Psychiatric - No depression, +anxiety    FUNCTIONAL PROGRESS  12/12 PT  Bed Mobility: Scooting/Bridging:     · Level of [place]	minimum assist (75% patients effort)    Bed Mobility: Supine to Sit:     · Level of [place]	minimum assist (75% patients effort)    Bed Mobility Analysis:     · Bed Mobility Limitations	bed mobility as per RN (pt received sitting in the recliner)    Transfer: Bed to Chair:     Transfer Skill: Bed to Chair   · Level of [place]	chair to the commode and back with RW min A    Transfer: Sit to Stand:     · Level of [place]	minimum assist (75% patients effort)  · Physical Assist/Nonphysical Assist	1 person assist; nonverbal cues (demo/gestures); verbal cues  · Weight-Bearing Restrictions	full weight-bearing; Maurice LE  · Assistive Device	rolling walker    Transfer: Stand to Sit:     · Level of [place]	minimum assist (75% patients effort)  · Physical Assist/Nonphysical Assist	1 person assist; nonverbal cues (demo/gestures); verbal cues  · Weight-Bearing Restrictions	full weight-bearing; Maurice LE  · Assistive Device	rolling walker    Sit/Stand Transfer Safety Analysis:     · Transfer Safety Concerns Noted	decreased sequencing ability; decreased weight-shifting ability; in sagittal plane  · Impairments Contributing to Impaired Transfers	decreased flexibility; impaired balance; decreased strength    Gait Skills:     · Level of [place]	minimum assist (75% patients effort)  · Physical Assist/Nonphysical Assist	verbal cues; nonverbal cues (demo/gestures); 1 person assist  · Weight-Bearing Restrictions	full weight-bearing; Maurice LE  · Assistive Device	rolling walker  · Gait Distance	5 feet; 10 feet    Gait Analysis:     · Gait Pattern Used	2-point gait   · Gait Deviations Noted	decreased valentin; decreased velocity of limb motion; decreased step length  · Impairments Contributing to Gait Deviations	impaired balance; decreased flexibility; decreased strength; narrow base of support        VITALS  T(C): 36.7 (12-15-22 @ 05:00), Max: 37.1 (12-14-22 @ 20:30)  HR: 88 (12-15-22 @ 05:00) (80 - 104)  BP: 112/68 (12-15-22 @ 05:00) (88/55 - 124/79)  RR: 18 (12-15-22 @ 05:00) (18 - 18)  SpO2: 98% (12-15-22 @ 05:00) (96% - 98%)  Wt(kg): --    MEDICATIONS   acetaminophen     Tablet .. 650 milliGRAM(s) every 6 hours PRN  ALPRAZolam 0.25 milliGRAM(s) daily PRN  apixaban 2.5 milliGRAM(s) two times a day  ascorbic acid 500 milliGRAM(s) daily  aspirin enteric coated 81 milliGRAM(s) daily  atorvastatin 20 milliGRAM(s) at bedtime  colchicine 0.6 milliGRAM(s) every 12 hours  ferrous    sulfate 325 milliGRAM(s) daily  levothyroxine 112 MICROGram(s) daily  lidocaine   4% Patch 1 Patch daily  megestrol Suspension 400 milliGRAM(s) daily  methylPREDNISolone     methylPREDNISolone 24 milliGRAM(s) once  metoprolol tartrate 50 milliGRAM(s) two times a day  oxyCODONE    IR 5 milliGRAM(s) every 4 hours PRN  pantoprazole    Tablet 40 milliGRAM(s) before breakfast  polyethylene glycol 3350 17 Gram(s) daily  potassium chloride    Tablet ER 20 milliEquivalent(s) daily  senna 2 Tablet(s) at bedtime  sertraline 100 milliGRAM(s) daily  sodium chloride 0.9% lock flush 3 milliLiter(s) every 8 hours  torsemide 40 milliGRAM(s) daily      RECENT LABS/IMAGING                          10.2   7.45  )-----------( 113      ( 15 Dec 2022 04:59 )             32.9     12-15    141  |  103  |  39.5<H>  ----------------------------<  87  4.6   |  25.0  |  1.12    Ca    8.7      15 Dec 2022 04:59  Mg     2.4     12-15                  CXR 12/13 - Small pleural effusions. Left apical pneumothorax as noted.  ----------------------------------------------------------------------------------------  PHYSICAL EXAM  Constitutional - NAD, Comfortable  Chest - Increased work of breathing, No wheezing  Extremities - Mild BLE swelling   Neurologic Exam -                    Cognitive - AAO to self, place, date, year, situation     FUNCTIONAL MOTOR EXAM - No focal deficits     Sensory - Intact to LT  Psychiatric - Appropriately anxious   ----------------------------------------------------------------------------------------  ASSESSMENT/PLAN  82yFemale with functional deficits after developing chest wound drainage from previous surgical admission  Cardiac/CABG/MV/AFIB - Eliquis, ASA, Demadex, Lopressor, Lipitor, Digoxin  Pulm - Medrol  Mood - Xanax, Zoloft   Pain - Tylenol, Lidoderm, Oxycodone  DVT PPX - SCDs  Rehab - Patient was unable to complete her [facility] course. Recommend patient returns to [facility] when medically/surgically cleared. Continue to recommend ACUTE inpatient rehabilitation for the functional deficits consisting of 3 hours of therapy/day & 24 hour RN/daily PMR physician for comorbid medical management. Will continue to follow for ongoing rehab needs and recommendations. Patient will be able to tolerate 3 hours a day.    Continue bedside therapy as well as OOB throughout the day with mobilization throughout the day with staff to maintain cardiopulmonary function and prevention of secondary complications related to debility.     Discussed with rehab clinical team.

## 2022-12-15 NOTE — DIETITIAN NUTRITION RISK NOTIFICATION - MALNUTRITION EVALUATION AS DEMONSTRATED BY (ADULTS > 20 YEARS OF AGE)
Daily Note     Today's date: 2020  Patient name: Moody Duverney  : 1968  MRN: [id number]  Referring provider: Janie Correa MD  Dx:   Encounter Diagnosis     ICD-10-CM    1  Right sided weakness R53 1    2  Spasticity R25 2        Start Time: 1030    Precautions:   Past Medical History:   Diagnosis Date    Abdominal cyst     Anemia     Hx     Chronic pain disorder     abdominal    Diabetes mellitus ([address] )     Diverticulitis     GI bleed     History of transfusion 2016    5 units    Lightheadedness     Multiple lesions on computed tomography of brain and spine     Spleen laceration        Subjective: 010      Objective: Initiated treatment with vibration through forearm/hand/wrist  exercises and activities to increase ROM, strength, coordination and function     Grasp and release of large clothes pegs   Patient given support for his Oculus Quest to trial at home - should improve his coordination    Suggested using WEB METRONOME for HEP to improve rhythm and timing  Precautions        Exercise Diary  20   Clothes pegs Firm 2 x 26 Firm 2 x 26 Firm 2 x 26 Firm 2 x 26 Firm 2 x 26   putty      Weight bearing through putty Weight bearing through putty   Weight bearing  On moving stool  On Moving stool On moving stool    Large geometric peg/pegboard          Weight pulley triceps #5 x15  IE#5 x 15  ER#5 x 15  Prot#5 x 15  Biceps#5 x 15 triceps #5 x15  IE#5 x 15  ER#5 x 15  Prot#5 x 15  Biceps#5 x 15   triceps #5 x15  IE#5 x 15  ER#5 x 15  Prot#5 x 15  Biceps#5 x 15 triceps #5 x15  IE#5 x 15  ER#5 x 15  Prot#5 x 15  Biceps#5 x 15 triceps #5 x15  IE#5 x 15  ER#5 x 15  Prot#5 x 15  Biceps#5 x 15   metronome Rhythm and timing x ~ 6 mins  Rhythm and timing x ~ 6 mins Rhythm and timing x ~ 5 mins    bends [place] therabar x 20  Orange therabar x 20 Orange therabar x 20 Orange therabar x 20 Vision Screening Recording:   vision screen: With Glasses  near acuity: R 20/200  L 20/70  binocularity far: esotropia  binocularity near: esotropia  red green fusion: suppression  near point of convergence: 6" with 10" conv break  deny string: Misalignment  Suppression far and near  Pursuits: Jerky with undershooting in horizontal plain  Saccades: Inaccurate with both eye undershooting in horizontal plain  ocular ROM: Limited in R/L upper quadrants  visual perceptual midline shift:     Vision: ST  - Pt will increase oculomotor control for improved saccades, con/divergent tasks for improved reading, board to table tasks with minimal increase in symptoms 4 weeks  - Pt will tolerate multi-modal envt x 15 min with 80% accuracy of cog load and min increase of symptoms of diplopia 4 weeks  Vision: LT  - Pt will increase oculomotor control for improved dynamic activities with head turns, board/screen to table tasks symptom free  - Pt will increase oculomotor control for WNL saccades, con/divergent tasks symptom free         Assessment: Tolerated treatment well  Patient would benefit from continued OT with Vision perception training     Very ataxic movement- difficulty to manipulate items       Plan: Continue per plan of care  Weight loss.../Inadequate energy intake.../Loss of subcutaneous fat.../Loss of muscle...

## 2022-12-15 NOTE — DIETITIAN INITIAL EVALUATION ADULT - ETIOLOGY
related to inability to consume sufficient protein energy intake with persistent poor appetite in setting of abd pain (hiatal hernia) and now pericardial effusion

## 2022-12-15 NOTE — PROGRESS NOTE ADULT - SUBJECTIVE AND OBJECTIVE BOX
POD #18 s/p CABG X 3 (LIMA-LAD, SVG-OM1, SVG-OM2), Mitral valve replacement (#27 Epic Plus Valve), and left atrial appendage clipping (45 V Clip)  Readmitted 12/8 with drainage from former chest tube site, and Left pleural effusion    PAST MEDICAL & SURGICAL HISTORY:  Hypertension  Hyperlipidemia  Hypothyroid  Breast cancer  History of mitral valve prolapse  S/P mastectomy, left    FAMILY HISTORY:  Family history of systemic lupus erythematosus (Child)  Family history of CVA (Grandparent)    Brief Hospital Course: 82F PMH of CAD, MVP, LBBB, Atrial Fibrillation, HTN, HLD, Hypothyroidism, Breast CA s/p b/l mastectomy, originally admitted to [facility] after experiencing acute onset chest pain, found to have NSTEMI, s/p cardiac cath that revealed triple vessel CAD with IABP placed and TTE showed severe MR. Patient transferred to [facility] ICU for surgical evaluation and medical management on 11/23. Patient underwent CABG X 3, MVR(t), DEVONTE Clip on 11/27/22. IABP removed 11/28. Postoperative course significant for cardiogenic shock requiring Primacor, ABLA, CHAVO. Patient was discharged to [facility] on 12/6. Now readmitted 12/8 with tachycardia, SOB, and high output serosanguinous drainage from prior mediastinal CT site. Left pigtail catheter placed for a Left pleural effusion 12/9.     Significant recent/past 24 hr events: Patient converted into AGNIESZKA with PO Digoxin given. Converted back to NSR.     Subjective: Patient lying in bed in NAD. +Tolerating diet. +Passing BMs. +Pain currently controlled. Denies fevers, chills, lightheadedness, dizziness, HA, CP, palpitations, SOB, cough, abdominal pain, N/V, diarrhea, numbness/tingling in extremities, or any other acute complaints. ROS negative x 10 systems except as noted above.    MEDICATIONS  (STANDING):  apixaban 2.5 milliGRAM(s) Oral two times a day  ascorbic acid 500 milliGRAM(s) Oral daily  aspirin enteric coated 81 milliGRAM(s) Oral daily  atorvastatin 20 milliGRAM(s) Oral at bedtime  colchicine 0.6 milliGRAM(s) Oral every 12 hours  digoxin     Tablet 250 MICROGram(s) Oral once  ferrous    sulfate 325 milliGRAM(s) Oral daily  levothyroxine 112 MICROGram(s) Oral daily  lidocaine   4% Patch 1 Patch Transdermal daily  megestrol Suspension 400 milliGRAM(s) Oral daily  metoprolol tartrate 50 milliGRAM(s) Oral two times a day  pantoprazole    Tablet 40 milliGRAM(s) Oral before breakfast  polyethylene glycol 3350 17 Gram(s) Oral daily  potassium chloride    Tablet ER 20 milliEquivalent(s) Oral daily  senna 2 Tablet(s) Oral at bedtime  sertraline 100 milliGRAM(s) Oral daily  sodium chloride 0.9% lock flush 3 milliLiter(s) IV Push every 8 hours  torsemide 40 milliGRAM(s) Oral daily    MEDICATIONS  (PRN):  acetaminophen     Tablet .. 650 milliGRAM(s) Oral every 6 hours PRN Mild Pain (1 - 3)  ALPRAZolam 0.25 milliGRAM(s) Oral daily PRN anxiety  oxyCODONE    IR 5 milliGRAM(s) Oral every 4 hours PRN Severe Pain (7 - 10)    Allergies:  amiodarone (Hives)  iodinated radiocontrast agents (Vomiting; Headache)    Vitals   T(C): 36.8 (15 Dec 2022 00:31), Max: 37.1 (14 Dec 2022 20:30)  T(F): 98.3 (15 Dec 2022 00:31), Max: 98.8 (14 Dec 2022 20:30)  HR: 90 (15 Dec 2022 00:31) (70 - 104)  BP: 124/79 (15 Dec 2022 00:31) (88/55 - 124/79)  RR: 18 (15 Dec 2022 00:31) (18 - 19)  SpO2: 96% (15 Dec 2022 00:31) (96% - 98%)  O2 Parameters below as of 15 Dec 2022 00:31  Patient On (Oxygen Delivery Method): room air    I&O's Detail    13 Dec 2022 07:01  -  14 Dec 2022 07:00  --------------------------------------------------------  IN:    Oral Fluid: 350 mL  Total IN: 350 mL    OUT:    Chest Tube (mL): 390 mL    Voided (mL): 750 mL  Total OUT: 1140 mL    Total NET: -790 mL      14 Dec 2022 07:01  -  15 Dec 2022 02:51  --------------------------------------------------------  IN:  Total IN: 0 mL    OUT:    Chest Tube (mL): 130 mL  Total OUT: 130 mL    Total NET: -130 mL    Physical Exam  Neuro: A+O x 3, non-focal, speech clear and intact  HEENT:  NCAT, No conjuctival edema or icterus, no thrush.    Neck:  Supple, trachea midline  Pulm: +Diminished BSs at bases bilaterally, no accessory muscle use noted  Chest: Left pigtail CT to waterseal with dressing intact and no air leak  CV: regular rate, regular rhythm, +S1S2, no murmur or rub noted  Abd: soft, NT, ND, + BS  Ext: MOSHER x 4, +2 LE pitting edema b/l, no cyanosis, distal motor/neuro/circ intact  Skin: warm, dry, perfused  Incisions: midsternal incision open to air C/D/I, sternum stable, LLE harvest site C/D/I     LABS                        9.8    9.62  )-----------( 156      ( 14 Dec 2022 22:05 )             31.2     12-14    143  |  103  |  38.8<H>  ----------------------------<  96  4.3   |  27.0  |  1.03    Ca    8.5      14 Dec 2022 22:05  Mg     1.9     12-14      Last CXR:  < from: Xray Chest 1 View- PORTABLE-Routine (Xray Chest 1 View- PORTABLE-Routine in AM.) (12.13.22 @ 06:14) >  Chest one view 12/13/2022 4:19 AM  Compared to the prior study, left apical pneumothorax is smaller. Small effusions are similar.  IMPRESSION:  Small pleural effusions. Left apical pneumothorax as noted.  < end of copied text >

## 2022-12-15 NOTE — DIETITIAN NUTRITION RISK NOTIFICATION - TREATMENT: THE FOLLOWING DIET HAS BEEN RECOMMENDED
Diet, DASH/TLC:   Sodium & Cholesterol Restricted  Lacto-Ovo Veg (Accepts Milk Prod., Eggs) (12-09-22 @ 17:39) [Active]

## 2022-12-15 NOTE — PROGRESS NOTE ADULT - PROBLEM SELECTOR PLAN 1
Readmitted 12/8 for tachycardia, SOB, mediastinal chest tube site drainage.  Reportedly, 1L serosang drainage from mediastinal chest tube site with ostomy bag placed over site.  Suspected pericardial effusion.   TTE on arrival showed EF 30-35%, trivial pericardial effusion.  Patient remains Hemodynamically stable.  Dispo: [facility]  Patient amenable to returning to rehab pending removal of L pigtail.  Plan to be discussed / reviewed with CT Surgery attending / team during AM rounds.

## 2022-12-15 NOTE — DIETITIAN INITIAL EVALUATION ADULT - NS FNS DIET ORDER
Diet, DASH/TLC:   Sodium & Cholesterol Restricted  Lacto-Ovo Veg (Accepts Milk Prod., Eggs) (12-09-22 @ 17:39)

## 2022-12-16 LAB
ANION GAP SERPL CALC-SCNC: 13 MMOL/L — SIGNIFICANT CHANGE UP (ref 5–17)
BUN SERPL-MCNC: 37.3 MG/DL — HIGH (ref 8–20)
CALCIUM SERPL-MCNC: 8.9 MG/DL — SIGNIFICANT CHANGE UP (ref 8.4–10.5)
CHLORIDE SERPL-SCNC: 101 MMOL/L — SIGNIFICANT CHANGE UP (ref 96–108)
CO2 SERPL-SCNC: 27 MMOL/L — SIGNIFICANT CHANGE UP (ref 22–29)
CREAT SERPL-MCNC: 1.07 MG/DL — SIGNIFICANT CHANGE UP (ref 0.5–1.3)
EGFR: 52 ML/MIN/1.73M2 — LOW
GLUCOSE SERPL-MCNC: 94 MG/DL — SIGNIFICANT CHANGE UP (ref 70–99)
HCT VFR BLD CALC: 34.7 % — SIGNIFICANT CHANGE UP (ref 34.5–45)
HGB BLD-MCNC: 10.5 G/DL — LOW (ref 11.5–15.5)
MAGNESIUM SERPL-MCNC: 2.4 MG/DL — SIGNIFICANT CHANGE UP (ref 1.6–2.6)
MCHC RBC-ENTMCNC: 30.2 PG — SIGNIFICANT CHANGE UP (ref 27–34)
MCHC RBC-ENTMCNC: 30.3 GM/DL — LOW (ref 32–36)
MCV RBC AUTO: 99.7 FL — SIGNIFICANT CHANGE UP (ref 80–100)
PLATELET # BLD AUTO: 101 K/UL — LOW (ref 150–400)
POTASSIUM SERPL-MCNC: 4 MMOL/L — SIGNIFICANT CHANGE UP (ref 3.5–5.3)
POTASSIUM SERPL-SCNC: 4 MMOL/L — SIGNIFICANT CHANGE UP (ref 3.5–5.3)
RBC # BLD: 3.48 M/UL — LOW (ref 3.8–5.2)
RBC # FLD: 18.7 % — HIGH (ref 10.3–14.5)
SARS-COV-2 RNA SPEC QL NAA+PROBE: SIGNIFICANT CHANGE UP
SODIUM SERPL-SCNC: 141 MMOL/L — SIGNIFICANT CHANGE UP (ref 135–145)
WBC # BLD: 9.38 K/UL — SIGNIFICANT CHANGE UP (ref 3.8–10.5)
WBC # FLD AUTO: 9.38 K/UL — SIGNIFICANT CHANGE UP (ref 3.8–10.5)

## 2022-12-16 PROCEDURE — 99024 POSTOP FOLLOW-UP VISIT: CPT

## 2022-12-16 PROCEDURE — 99232 SBSQ HOSP IP/OBS MODERATE 35: CPT

## 2022-12-16 PROCEDURE — 71045 X-RAY EXAM CHEST 1 VIEW: CPT | Mod: 26

## 2022-12-16 RX ADMIN — Medication 325 MILLIGRAM(S): at 08:39

## 2022-12-16 RX ADMIN — Medication 81 MILLIGRAM(S): at 08:39

## 2022-12-16 RX ADMIN — Medication 50 MILLIGRAM(S): at 05:05

## 2022-12-16 RX ADMIN — Medication 40 MILLIGRAM(S): at 05:07

## 2022-12-16 RX ADMIN — SODIUM CHLORIDE 3 MILLILITER(S): 9 INJECTION INTRAMUSCULAR; INTRAVENOUS; SUBCUTANEOUS at 13:14

## 2022-12-16 RX ADMIN — Medication 4 MILLIGRAM(S): at 08:39

## 2022-12-16 RX ADMIN — Medication 112 MICROGRAM(S): at 05:05

## 2022-12-16 RX ADMIN — APIXABAN 2.5 MILLIGRAM(S): 2.5 TABLET, FILM COATED ORAL at 05:06

## 2022-12-16 RX ADMIN — SERTRALINE 100 MILLIGRAM(S): 25 TABLET, FILM COATED ORAL at 08:39

## 2022-12-16 RX ADMIN — SODIUM CHLORIDE 3 MILLILITER(S): 9 INJECTION INTRAMUSCULAR; INTRAVENOUS; SUBCUTANEOUS at 21:30

## 2022-12-16 RX ADMIN — Medication 4 MILLIGRAM(S): at 18:02

## 2022-12-16 RX ADMIN — ATORVASTATIN CALCIUM 20 MILLIGRAM(S): 80 TABLET, FILM COATED ORAL at 22:23

## 2022-12-16 RX ADMIN — Medication 0.6 MILLIGRAM(S): at 17:09

## 2022-12-16 RX ADMIN — Medication 20 MILLIEQUIVALENT(S): at 08:39

## 2022-12-16 RX ADMIN — SODIUM CHLORIDE 3 MILLILITER(S): 9 INJECTION INTRAMUSCULAR; INTRAVENOUS; SUBCUTANEOUS at 05:46

## 2022-12-16 RX ADMIN — Medication 8 MILLIGRAM(S): at 22:22

## 2022-12-16 RX ADMIN — Medication 500 MILLIGRAM(S): at 08:39

## 2022-12-16 RX ADMIN — Medication 50 MILLIGRAM(S): at 17:09

## 2022-12-16 RX ADMIN — SENNA PLUS 2 TABLET(S): 8.6 TABLET ORAL at 22:23

## 2022-12-16 RX ADMIN — Medication 0.6 MILLIGRAM(S): at 05:05

## 2022-12-16 RX ADMIN — PANTOPRAZOLE SODIUM 40 MILLIGRAM(S): 20 TABLET, DELAYED RELEASE ORAL at 05:05

## 2022-12-16 RX ADMIN — Medication 4 MILLIGRAM(S): at 13:15

## 2022-12-16 RX ADMIN — Medication 125 MICROGRAM(S): at 05:05

## 2022-12-16 RX ADMIN — POLYETHYLENE GLYCOL 3350 17 GRAM(S): 17 POWDER, FOR SOLUTION ORAL at 08:40

## 2022-12-16 RX ADMIN — APIXABAN 2.5 MILLIGRAM(S): 2.5 TABLET, FILM COATED ORAL at 17:09

## 2022-12-16 NOTE — PROGRESS NOTE ADULT - PROBLEM SELECTOR PLAN 5
Continue Eliquis 2.5BID for MVR and Afib.  Continue Lopressor as tolerated by HR and BP.  Dig started 12/14 as per Dr. Ackerman for recurrent PAF on telemetry.  Now remains NSR on telemetry.

## 2022-12-16 NOTE — PROGRESS NOTE ADULT - SUBJECTIVE AND OBJECTIVE BOX
She reports her strength is improving.  She is having bowel movements.  Reports she is motivated continue with rehab.      REVIEW OF SYSTEMS  Constitutional - No fever,  No fatigue  HEENT - No vertigo, No neck pain  Neurological - No headaches, No memory loss, + loss of strength, No numbness, No tremors  Skin - No rashes, No lesions   Musculoskeletal - No joint pain, No joint swelling, No muscle pain  Psychiatric - No depression, No anxiety    FUNCTIONAL PROGRESS  12/15/22  Sit-Stand Transfer Training  Transfer Training Sit-to-Stand Transfer: minimum assist (75% patient effort);  1 person assist;  nonverbal cues (demo/gestures);  verbal cues;  full weight-bearing   rolling walker  Transfer Training Stand-to-Sit Transfer: minimum assist (75% patient effort);  nonverbal cues (demo/gestures);  verbal cues;  1 person assist;  full weight-bearing   rolling walker  Sit-to-Stand Transfer Training Transfer Safety Analysis: decreased step length;  decreased strength    Gait Training  Gait Trainin feet;  contact guard;  1 person assist;  nonverbal cues (demo/gestures);  verbal cues;  full weight-bearing   rolling walker  Gait Analysis: 3-point gait   decreased valentin;  decreased hip/knee flexion;  increased time in double stance;  decreased step length;  decreased stride length;  decreased strength        VITALS  T(C): 36.8 (22 @ 04:00), Max: 37.1 (12-15-22 @ 12:00)  HR: 80 (22 @ 04:00) (78 - 88)  BP: 122/68 (22 @ 04:00) (106/65 - 129/85)  RR: 18 (22 @ 04:00) (17 - 18)  SpO2: 98% (22 @ 04:00) (96% - 98%)  Wt(kg): --    MEDICATIONS   acetaminophen     Tablet .. 650 milliGRAM(s) every 6 hours PRN  ALPRAZolam 0.25 milliGRAM(s) daily PRN  apixaban 2.5 milliGRAM(s) two times a day  ascorbic acid 500 milliGRAM(s) daily  aspirin enteric coated 81 milliGRAM(s) daily  atorvastatin 20 milliGRAM(s) at bedtime  colchicine 0.6 milliGRAM(s) every 12 hours  digoxin     Tablet 125 MICROGram(s) daily  ferrous    sulfate 325 milliGRAM(s) daily  levothyroxine 112 MICROGram(s) daily  lidocaine   4% Patch 1 Patch daily  megestrol Suspension 400 milliGRAM(s) daily  methylPREDNISolone 4 milliGRAM(s) before breakfast  methylPREDNISolone 4 milliGRAM(s) after lunch  methylPREDNISolone 4 milliGRAM(s) after dinner  methylPREDNISolone 8 milliGRAM(s) at bedtime  methylPREDNISolone     metoprolol tartrate 50 milliGRAM(s) two times a day  oxyCODONE    IR 5 milliGRAM(s) every 4 hours PRN  pantoprazole    Tablet 40 milliGRAM(s) before breakfast  polyethylene glycol 3350 17 Gram(s) daily  potassium chloride    Tablet ER 20 milliEquivalent(s) daily  senna 2 Tablet(s) at bedtime  sertraline 100 milliGRAM(s) daily  sodium chloride 0.9% lock flush 3 milliLiter(s) every 8 hours  torsemide 40 milliGRAM(s) daily      RECENT LABS/IMAGING - Reviewed                        10.5   9.38  )-----------( 101      ( 16 Dec 2022 05:03 )             34.7         141  |  101  |  37.3<H>  ----------------------------<  94  4.0   |  27.0  |  1.07    Ca    8.9      16 Dec 2022 05:03  Mg     2.4               CXR  - Small pleural effusions. Left apical pneumothorax as noted.    ----------------------------------------------------------------------------------------  PHYSICAL EXAM  Constitutional - NAD, Comfortable  Chest - No wheezing, no respiratory distress   Extremities - No erythema   Neurologic Exam -                    Cognitive - AAO to self, place, date, year, situation     FUNCTIONAL MOTOR EXAM - 4/5 strength throughout      Sensory - Intact to LT  Psychiatric - Appropriate   ----------------------------------------------------------------------------------------  ASSESSMENT/PLAN  82yFemale with functional deficits after developing chest wound drainage from previous surgical admission  Cardiac/CABG/MV/AFIB - Eliquis, ASA, torsemide, Lopressor, Lipitor, Digoxin  Pulm - Medrol  Mood - Zoloft   Pain - Tylenol, Lidoderm, Oxycodone  Constipation - senna, Miralax   DVT PPX - SCDs, apixaban   Rehab - Continue PT/OT.  Recommend acute rehabilitation once medically stable for continued therapies.  Will continue to follow for ongoing rehab needs and recommendations. Patient will be able to tolerate 3 hours a day.    Continue bedside therapy as well as OOB throughout the day with mobilization throughout the day with staff to maintain cardiopulmonary function and prevention of secondary complications related to debility.

## 2022-12-16 NOTE — PROGRESS NOTE ADULT - SUBJECTIVE AND OBJECTIVE BOX
POD #19 s/p CABG X 3 (LIMA-LAD, SVG-OM1, SVG-OM2), Mitral valve replacement (#27 Epic Plus Valve), and left atrial appendage clipping (45 V Clip)  Readmitted 12/8 with drainage from former chest tube site, and Left pleural effusion    PAST MEDICAL & SURGICAL HISTORY:  Hypertension  Hyperlipidemia  Hypothyroid  Breast cancer  History of mitral valve prolapse  S/P mastectomy, left    FAMILY HISTORY:  Family history of systemic lupus erythematosus (Child)  Family history of CVA (Grandparent)    Brief Hospital Course: 82F PMH of CAD, MVP, LBBB, Atrial Fibrillation, HTN, HLD, Hypothyroidism, Breast CA s/p b/l mastectomy, originally admitted to [facility] after experiencing acute onset chest pain, found to have NSTEMI, s/p cardiac cath that revealed triple vessel CAD with IABP placed and TTE showed severe MR. Patient transferred to [facility] ICU for surgical evaluation and medical management on 11/23. Patient underwent CABG X 3, MVR(t), DEVONTE Clip on 11/27/22. IABP removed 11/28. Postoperative course significant for cardiogenic shock requiring Primacor, ABLA, CHAVO. Patient was discharged to [facility] on 12/6 developed SOB, tachycardia, and copious drainage from CT site requiring readmission 12/8 now s/p Left pigtail catheter placed for a Left pleural effusion 12/9. Hospital course significant for rapid atrial fibrillation (PO Dig started, patient converted back to NSR, remains on Eliquis 2.5BID).    Significant recent/past 24 hr events: No overnight events reported.    Subjective: Patient lying in bed in NAD. +Tolerating diet. +Passing BMs. +Pain currently controlled. Denies fevers, chills, lightheadedness, dizziness, HA, CP, palpitations, SOB, cough, abdominal pain, N/V, diarrhea, numbness/tingling in extremities, or any other acute complaints. ROS negative x 10 systems except as noted above.    MEDICATIONS  (STANDING):  apixaban 2.5 milliGRAM(s) Oral two times a day  ascorbic acid 500 milliGRAM(s) Oral daily  aspirin enteric coated 81 milliGRAM(s) Oral daily  atorvastatin 20 milliGRAM(s) Oral at bedtime  colchicine 0.6 milliGRAM(s) Oral every 12 hours  digoxin     Tablet 125 MICROGram(s) Oral daily  ferrous    sulfate 325 milliGRAM(s) Oral daily  levothyroxine 112 MICROGram(s) Oral daily  lidocaine   4% Patch 1 Patch Transdermal daily  megestrol Suspension 400 milliGRAM(s) Oral daily  methylPREDNISolone 4 milliGRAM(s) Oral after dinner  methylPREDNISolone 8 milliGRAM(s) Oral at bedtime  methylPREDNISolone   Oral   methylPREDNISolone 4 milliGRAM(s) Oral before breakfast  methylPREDNISolone 4 milliGRAM(s) Oral after lunch  metoprolol tartrate 50 milliGRAM(s) Oral two times a day  pantoprazole    Tablet 40 milliGRAM(s) Oral before breakfast  polyethylene glycol 3350 17 Gram(s) Oral daily  potassium chloride    Tablet ER 20 milliEquivalent(s) Oral daily  senna 2 Tablet(s) Oral at bedtime  sertraline 100 milliGRAM(s) Oral daily  sodium chloride 0.9% lock flush 3 milliLiter(s) IV Push every 8 hours  torsemide 40 milliGRAM(s) Oral daily    MEDICATIONS  (PRN):  acetaminophen     Tablet .. 650 milliGRAM(s) Oral every 6 hours PRN Mild Pain (1 - 3)  ALPRAZolam 0.25 milliGRAM(s) Oral daily PRN anxiety  oxyCODONE    IR 5 milliGRAM(s) Oral every 4 hours PRN Severe Pain (7 - 10)    Allergies:  amiodarone (Hives)  iodinated radiocontrast agents (Vomiting; Headache)    Vitals   T(C): 36.7 (16 Dec 2022 00:00), Max: 37.1 (15 Dec 2022 12:00)  T(F): 98 (16 Dec 2022 00:00), Max: 98.7 (15 Dec 2022 12:00)  HR: 78 (16 Dec 2022 00:00) (78 - 88)  BP: 120/68 (16 Dec 2022 00:00) (106/65 - 129/85)  RR: 18 (16 Dec 2022 00:00) (17 - 18)  SpO2: 98% (16 Dec 2022 00:00) (96% - 98%)  O2 Parameters below as of 16 Dec 2022 00:00  Patient On (Oxygen Delivery Method): room air    I&O's Detail    14 Dec 2022 07:01  -  15 Dec 2022 07:00  --------------------------------------------------------  IN:  Total IN: 0 mL    OUT:    Chest Tube (mL): 150 mL  Total OUT: 150 mL    Total NET: -150 mL      15 Dec 2022 07:01  -  16 Dec 2022 00:59  --------------------------------------------------------  IN:    Oral Fluid: 800 mL  Total IN: 800 mL    OUT:    Chest Tube (mL): 160 mL  Total OUT: 160 mL    Total NET: 640 mL    Physical Exam  Neuro: A+O x 3, non-focal, speech clear and intact  HEENT:  NCAT, No conjuctival edema or icterus, no thrush.    Neck:  Supple, trachea midline  Pulm: +Diminished BSs at bases bilaterally, no accessory muscle use noted  Chest: Left pigtail CT to waterseal with dressing intact and no air leak  CV: regular rate, regular rhythm, +S1S2, no murmur or rub noted  Abd: soft, NT, ND, + BS  Ext: MOSHER x 4, +2 LE pitting edema b/l, no cyanosis, distal motor/neuro/circ intact  Skin: warm, dry, perfused  Incisions: midsternal incision open to air C/D/I, sternum stable, LLE harvest site C/D/I     LABS                        10.2   7.45  )-----------( 113      ( 15 Dec 2022 04:59 )             32.9     12-15    141  |  103  |  39.5<H>  ----------------------------<  87  4.6   |  25.0  |  1.12    Ca    8.7      15 Dec 2022 04:59  Mg     2.4     12-15      Last CXR:  < from: Xray Chest 1 View- PORTABLE-Routine (Xray Chest 1 View- PORTABLE-Routine in AM.) (12.13.22 @ 06:14) >  Chest one view 12/13/2022 4:19 AM  Compared to the prior study, left apical pneumothorax is smaller. Small effusions are similar.  IMPRESSION:  Small pleural effusions. Left apical pneumothorax as noted.  < end of copied text >

## 2022-12-17 LAB
ANION GAP SERPL CALC-SCNC: 11 MMOL/L — SIGNIFICANT CHANGE UP (ref 5–17)
ANION GAP SERPL CALC-SCNC: 15 MMOL/L — SIGNIFICANT CHANGE UP (ref 5–17)
BUN SERPL-MCNC: 33.3 MG/DL — HIGH (ref 8–20)
BUN SERPL-MCNC: 34.2 MG/DL — HIGH (ref 8–20)
CALCIUM SERPL-MCNC: 9 MG/DL — SIGNIFICANT CHANGE UP (ref 8.4–10.5)
CALCIUM SERPL-MCNC: 9.3 MG/DL — SIGNIFICANT CHANGE UP (ref 8.4–10.5)
CHLORIDE SERPL-SCNC: 96 MMOL/L — SIGNIFICANT CHANGE UP (ref 96–108)
CHLORIDE SERPL-SCNC: 98 MMOL/L — SIGNIFICANT CHANGE UP (ref 96–108)
CO2 SERPL-SCNC: 20 MMOL/L — LOW (ref 22–29)
CO2 SERPL-SCNC: 30 MMOL/L — HIGH (ref 22–29)
CREAT SERPL-MCNC: 1.05 MG/DL — SIGNIFICANT CHANGE UP (ref 0.5–1.3)
CREAT SERPL-MCNC: 1.21 MG/DL — SIGNIFICANT CHANGE UP (ref 0.5–1.3)
EGFR: 45 ML/MIN/1.73M2 — LOW
EGFR: 53 ML/MIN/1.73M2 — LOW
GLUCOSE SERPL-MCNC: 150 MG/DL — HIGH (ref 70–99)
GLUCOSE SERPL-MCNC: 77 MG/DL — SIGNIFICANT CHANGE UP (ref 70–99)
HCT VFR BLD CALC: 34.3 % — LOW (ref 34.5–45)
HGB BLD-MCNC: 10.5 G/DL — LOW (ref 11.5–15.5)
MAGNESIUM SERPL-MCNC: 2.1 MG/DL — SIGNIFICANT CHANGE UP (ref 1.6–2.6)
MCHC RBC-ENTMCNC: 30.3 PG — SIGNIFICANT CHANGE UP (ref 27–34)
MCHC RBC-ENTMCNC: 30.6 GM/DL — LOW (ref 32–36)
MCV RBC AUTO: 99.1 FL — SIGNIFICANT CHANGE UP (ref 80–100)
PLATELET # BLD AUTO: 83 K/UL — LOW (ref 150–400)
POTASSIUM SERPL-MCNC: 5.2 MMOL/L — SIGNIFICANT CHANGE UP (ref 3.5–5.3)
POTASSIUM SERPL-MCNC: 6.5 MMOL/L — CRITICAL HIGH (ref 3.5–5.3)
POTASSIUM SERPL-SCNC: 5.2 MMOL/L — SIGNIFICANT CHANGE UP (ref 3.5–5.3)
POTASSIUM SERPL-SCNC: 6.5 MMOL/L — CRITICAL HIGH (ref 3.5–5.3)
RBC # BLD: 3.46 M/UL — LOW (ref 3.8–5.2)
RBC # FLD: 18.7 % — HIGH (ref 10.3–14.5)
SODIUM SERPL-SCNC: 132 MMOL/L — LOW (ref 135–145)
SODIUM SERPL-SCNC: 137 MMOL/L — SIGNIFICANT CHANGE UP (ref 135–145)
WBC # BLD: 7.36 K/UL — SIGNIFICANT CHANGE UP (ref 3.8–10.5)
WBC # FLD AUTO: 7.36 K/UL — SIGNIFICANT CHANGE UP (ref 3.8–10.5)

## 2022-12-17 PROCEDURE — 99024 POSTOP FOLLOW-UP VISIT: CPT

## 2022-12-17 PROCEDURE — 71045 X-RAY EXAM CHEST 1 VIEW: CPT | Mod: 26

## 2022-12-17 RX ORDER — MAGNESIUM SULFATE 500 MG/ML
2 VIAL (ML) INJECTION ONCE
Refills: 0 | Status: COMPLETED | OUTPATIENT
Start: 2022-12-17 | End: 2022-12-17

## 2022-12-17 RX ADMIN — SODIUM CHLORIDE 3 MILLILITER(S): 9 INJECTION INTRAMUSCULAR; INTRAVENOUS; SUBCUTANEOUS at 22:39

## 2022-12-17 RX ADMIN — APIXABAN 2.5 MILLIGRAM(S): 2.5 TABLET, FILM COATED ORAL at 17:34

## 2022-12-17 RX ADMIN — SENNA PLUS 2 TABLET(S): 8.6 TABLET ORAL at 21:40

## 2022-12-17 RX ADMIN — SODIUM CHLORIDE 3 MILLILITER(S): 9 INJECTION INTRAMUSCULAR; INTRAVENOUS; SUBCUTANEOUS at 14:13

## 2022-12-17 RX ADMIN — Medication 25 GRAM(S): at 21:39

## 2022-12-17 RX ADMIN — SODIUM CHLORIDE 3 MILLILITER(S): 9 INJECTION INTRAMUSCULAR; INTRAVENOUS; SUBCUTANEOUS at 05:19

## 2022-12-17 RX ADMIN — Medication 20 MILLIEQUIVALENT(S): at 12:28

## 2022-12-17 RX ADMIN — PANTOPRAZOLE SODIUM 40 MILLIGRAM(S): 20 TABLET, DELAYED RELEASE ORAL at 05:19

## 2022-12-17 RX ADMIN — ATORVASTATIN CALCIUM 20 MILLIGRAM(S): 80 TABLET, FILM COATED ORAL at 21:40

## 2022-12-17 RX ADMIN — Medication 112 MICROGRAM(S): at 05:18

## 2022-12-17 RX ADMIN — Medication 50 MILLIGRAM(S): at 05:19

## 2022-12-17 RX ADMIN — Medication 325 MILLIGRAM(S): at 12:28

## 2022-12-17 RX ADMIN — Medication 125 MICROGRAM(S): at 05:18

## 2022-12-17 RX ADMIN — Medication 0.6 MILLIGRAM(S): at 05:18

## 2022-12-17 RX ADMIN — APIXABAN 2.5 MILLIGRAM(S): 2.5 TABLET, FILM COATED ORAL at 05:18

## 2022-12-17 RX ADMIN — Medication 500 MILLIGRAM(S): at 12:28

## 2022-12-17 RX ADMIN — Medication 4 MILLIGRAM(S): at 05:19

## 2022-12-17 RX ADMIN — Medication 4 MILLIGRAM(S): at 17:34

## 2022-12-17 RX ADMIN — Medication 0.6 MILLIGRAM(S): at 17:34

## 2022-12-17 RX ADMIN — Medication 40 MILLIGRAM(S): at 05:19

## 2022-12-17 RX ADMIN — Medication 4 MILLIGRAM(S): at 21:39

## 2022-12-17 RX ADMIN — Medication 4 MILLIGRAM(S): at 14:14

## 2022-12-17 RX ADMIN — Medication 50 MILLIGRAM(S): at 17:34

## 2022-12-17 RX ADMIN — SERTRALINE 100 MILLIGRAM(S): 25 TABLET, FILM COATED ORAL at 12:28

## 2022-12-17 RX ADMIN — Medication 81 MILLIGRAM(S): at 12:27

## 2022-12-17 NOTE — PROGRESS NOTE ADULT - PROBLEM SELECTOR PLAN 1
Readmitted 12/8 for tachycardia, SOB, mediastinal chest tube site drainage.  Reportedly, 1L serosang drainage from mediastinal chest tube site with ostomy bag placed over site.  Suspected pericardial effusion.   TTE on arrival showed EF 30-35%, trivial pericardial effusion.  Patient remains Hemodynamically stable.  Dispo: Rehab  Patient amenable to [place] rehab pending removal of L pigtail.  Plan to be discussed / reviewed with CT Surgery attending / team during AM rounds.

## 2022-12-17 NOTE — PROGRESS NOTE ADULT - SUBJECTIVE AND OBJECTIVE BOX
POD #20 s/p CABG X 3 (LIMA-LAD, SVG-OM1, SVG-OM2), Mitral valve replacement (#27 Epic Plus Valve), and left atrial appendage clipping (45 V Clip)  Readmitted 12/8 with drainage from former chest tube site, and Left pleural effusion    PAST MEDICAL & SURGICAL HISTORY:  Hypertension  Hyperlipidemia  Hypothyroid  Breast cancer  History of mitral valve prolapse  S/P mastectomy, left    FAMILY HISTORY:  Family history of systemic lupus erythematosus (Child)  Family history of CVA (Grandparent)    Brief Hospital Course: 82F PMH of CAD, MVP, LBBB, Atrial Fibrillation, HTN, HLD, Hypothyroidism, Breast CA s/p b/l mastectomy, originally admitted to [facility] after experiencing acute onset chest pain, found to have NSTEMI, s/p cardiac cath that revealed triple vessel CAD with IABP placed and TTE showed severe MR. Patient transferred to [facility] ICU for surgical evaluation and medical management on 11/23. Patient underwent CABG X 3, MVR(t), DEVONTE Clip on 11/27/22. IABP removed 11/28. Postoperative course significant for cardiogenic shock requiring Primacor, ABLA, CHAVO. Patient was discharged to [facility] on 12/6 developed SOB, tachycardia, and copious drainage from CT site requiring readmission 12/8 now s/p Left pigtail catheter placed for a Left pleural effusion 12/9. Hospital course significant for rapid atrial fibrillation (PO Dig started, patient converted back to NSR, remains on Eliquis 2.5BID).    Significant recent/past 24 hr events: No overnight events reported.    Subjective: Patient lying in bed in NAD. +Tolerating diet. +Passing BMs. +Pain currently controlled. Denies fevers, chills, lightheadedness, dizziness, HA, CP, palpitations, SOB, cough, abdominal pain, N/V, diarrhea, numbness/tingling in extremities, or any other acute complaints. ROS negative x 10 systems except as noted above.    MEDICATIONS  (STANDING):  apixaban 2.5 milliGRAM(s) Oral two times a day  ascorbic acid 500 milliGRAM(s) Oral daily  aspirin enteric coated 81 milliGRAM(s) Oral daily  atorvastatin 20 milliGRAM(s) Oral at bedtime  colchicine 0.6 milliGRAM(s) Oral every 12 hours  digoxin     Tablet 125 MICROGram(s) Oral daily  ferrous    sulfate 325 milliGRAM(s) Oral daily  levothyroxine 112 MICROGram(s) Oral daily  lidocaine   4% Patch 1 Patch Transdermal daily  megestrol Suspension 400 milliGRAM(s) Oral daily  methylPREDNISolone 4 milliGRAM(s) Oral before breakfast  methylPREDNISolone 4 milliGRAM(s) Oral after lunch  methylPREDNISolone   Oral   methylPREDNISolone 4 milliGRAM(s) Oral after dinner  methylPREDNISolone 4 milliGRAM(s) Oral at bedtime  metoprolol tartrate 50 milliGRAM(s) Oral two times a day  pantoprazole    Tablet 40 milliGRAM(s) Oral before breakfast  polyethylene glycol 3350 17 Gram(s) Oral daily  potassium chloride    Tablet ER 20 milliEquivalent(s) Oral daily  senna 2 Tablet(s) Oral at bedtime  sertraline 100 milliGRAM(s) Oral daily  sodium chloride 0.9% lock flush 3 milliLiter(s) IV Push every 8 hours  torsemide 40 milliGRAM(s) Oral daily    MEDICATIONS  (PRN):  acetaminophen     Tablet .. 650 milliGRAM(s) Oral every 6 hours PRN Mild Pain (1 - 3)  ALPRAZolam 0.25 milliGRAM(s) Oral daily PRN anxiety    Allergies:  amiodarone (Hives)  iodinated radiocontrast agents (Vomiting; Headache)    Vitals   T(C): 36.7 (17 Dec 2022 00:00), Max: 37 (16 Dec 2022 12:30)  T(F): 98.1 (17 Dec 2022 00:00), Max: 98.6 (16 Dec 2022 12:30)  HR: 79 (17 Dec 2022 00:00) (75 - 98)  BP: 123/60 (17 Dec 2022 00:00) (106/64 - 123/72)  RR: 18 (17 Dec 2022 00:00) (17 - 18)  SpO2: 97% (17 Dec 2022 00:00) (95% - 98%)  O2 Parameters below as of 17 Dec 2022 00:00  Patient On (Oxygen Delivery Method): room air    I&O's Detail    15 Dec 2022 07:01  -  16 Dec 2022 07:00  --------------------------------------------------------  IN:    Oral Fluid: 800 mL  Total IN: 800 mL    OUT:    Chest Tube (mL): 160 mL  Total OUT: 160 mL    Total NET: 640 mL      16 Dec 2022 07:01  -  17 Dec 2022 03:23  --------------------------------------------------------  IN:    Oral Fluid: 530 mL  Total IN: 530 mL    OUT:    Chest Tube (mL): 10 mL    Voided (mL): 350 mL  Total OUT: 360 mL    Total NET: 170 mL    Physical Exam  Neuro: A+O x 3, non-focal, speech clear and intact  HEENT:  NCAT, No conjuctival edema or icterus, no thrush.    Neck:  Supple, trachea midline  Pulm: +Diminished BSs at bases bilaterally, no accessory muscle use noted  Chest: Left pigtail CT to waterseal with dressing intact and no air leak  CV: regular rate, regular rhythm, +S1S2, no murmur or rub noted  Abd: soft, NT, ND, + BS  Ext: MOSHER x 4, +1-2 LE pitting edema b/l, no cyanosis, distal motor/neuro/circ intact  Skin: warm, dry, perfused  Incisions: midsternal incision open to air C/D/I, sternum stable, LLE harvest site C/D/I     LABS                        10.5   9.38  )-----------( 101      ( 16 Dec 2022 05:03 )             34.7     12-16    141  |  101  |  37.3<H>  ----------------------------<  94  4.0   |  27.0  |  1.07    Ca    8.9      16 Dec 2022 05:03  Mg     2.4     12-16    Last CXR:  < from: Xray Chest 1 View- PORTABLE-Routine (Xray Chest 1 View- PORTABLE-Routine in AM.) (12.14.22 @ 05:08) >  IMPRESSION: No change heart mediastinum. Single left chest pigtail catheter reidentified in situ. No change in the tiny left apical pneumothorax small left pleural effusion and right basilar atelectasis.  < end of copied text >

## 2022-12-18 LAB
ANION GAP SERPL CALC-SCNC: 13 MMOL/L — SIGNIFICANT CHANGE UP (ref 5–17)
BUN SERPL-MCNC: 33.1 MG/DL — HIGH (ref 8–20)
CALCIUM SERPL-MCNC: 9 MG/DL — SIGNIFICANT CHANGE UP (ref 8.4–10.5)
CHLORIDE SERPL-SCNC: 99 MMOL/L — SIGNIFICANT CHANGE UP (ref 96–108)
CO2 SERPL-SCNC: 26 MMOL/L — SIGNIFICANT CHANGE UP (ref 22–29)
CREAT SERPL-MCNC: 1.06 MG/DL — SIGNIFICANT CHANGE UP (ref 0.5–1.3)
DIGOXIN SERPL-MCNC: 3 NG/ML — CRITICAL HIGH (ref 0.8–2)
EGFR: 52 ML/MIN/1.73M2 — LOW
GLUCOSE SERPL-MCNC: 100 MG/DL — HIGH (ref 70–99)
HCT VFR BLD CALC: 32.7 % — LOW (ref 34.5–45)
HGB BLD-MCNC: 10.4 G/DL — LOW (ref 11.5–15.5)
MAGNESIUM SERPL-MCNC: 2.4 MG/DL — SIGNIFICANT CHANGE UP (ref 1.6–2.6)
MCHC RBC-ENTMCNC: 30.7 PG — SIGNIFICANT CHANGE UP (ref 27–34)
MCHC RBC-ENTMCNC: 31.8 GM/DL — LOW (ref 32–36)
MCV RBC AUTO: 96.5 FL — SIGNIFICANT CHANGE UP (ref 80–100)
PLATELET # BLD AUTO: 110 K/UL — LOW (ref 150–400)
POTASSIUM SERPL-MCNC: 4.2 MMOL/L — SIGNIFICANT CHANGE UP (ref 3.5–5.3)
POTASSIUM SERPL-SCNC: 4.2 MMOL/L — SIGNIFICANT CHANGE UP (ref 3.5–5.3)
RBC # BLD: 3.39 M/UL — LOW (ref 3.8–5.2)
RBC # FLD: 18.6 % — HIGH (ref 10.3–14.5)
SARS-COV-2 RNA SPEC QL NAA+PROBE: SIGNIFICANT CHANGE UP
SODIUM SERPL-SCNC: 138 MMOL/L — SIGNIFICANT CHANGE UP (ref 135–145)
WBC # BLD: 4.27 K/UL — SIGNIFICANT CHANGE UP (ref 3.8–10.5)
WBC # FLD AUTO: 4.27 K/UL — SIGNIFICANT CHANGE UP (ref 3.8–10.5)

## 2022-12-18 PROCEDURE — 99024 POSTOP FOLLOW-UP VISIT: CPT

## 2022-12-18 PROCEDURE — 71045 X-RAY EXAM CHEST 1 VIEW: CPT | Mod: 26

## 2022-12-18 RX ORDER — BENZOCAINE AND MENTHOL 5; 1 G/100ML; G/100ML
1 LIQUID ORAL EVERY 8 HOURS
Refills: 0 | Status: DISCONTINUED | OUTPATIENT
Start: 2022-12-18 | End: 2022-12-19

## 2022-12-18 RX ADMIN — BENZOCAINE AND MENTHOL 1 LOZENGE: 5; 1 LIQUID ORAL at 21:50

## 2022-12-18 RX ADMIN — SODIUM CHLORIDE 3 MILLILITER(S): 9 INJECTION INTRAMUSCULAR; INTRAVENOUS; SUBCUTANEOUS at 05:08

## 2022-12-18 RX ADMIN — Medication 40 MILLIGRAM(S): at 05:04

## 2022-12-18 RX ADMIN — APIXABAN 2.5 MILLIGRAM(S): 2.5 TABLET, FILM COATED ORAL at 05:03

## 2022-12-18 RX ADMIN — Medication 50 MILLIGRAM(S): at 05:03

## 2022-12-18 RX ADMIN — Medication 325 MILLIGRAM(S): at 12:26

## 2022-12-18 RX ADMIN — Medication 0.6 MILLIGRAM(S): at 05:04

## 2022-12-18 RX ADMIN — Medication 4 MILLIGRAM(S): at 21:50

## 2022-12-18 RX ADMIN — Medication 500 MILLIGRAM(S): at 12:26

## 2022-12-18 RX ADMIN — Medication 50 MILLIGRAM(S): at 17:26

## 2022-12-18 RX ADMIN — POLYETHYLENE GLYCOL 3350 17 GRAM(S): 17 POWDER, FOR SOLUTION ORAL at 12:26

## 2022-12-18 RX ADMIN — ATORVASTATIN CALCIUM 20 MILLIGRAM(S): 80 TABLET, FILM COATED ORAL at 21:50

## 2022-12-18 RX ADMIN — APIXABAN 2.5 MILLIGRAM(S): 2.5 TABLET, FILM COATED ORAL at 17:26

## 2022-12-18 RX ADMIN — SERTRALINE 100 MILLIGRAM(S): 25 TABLET, FILM COATED ORAL at 12:26

## 2022-12-18 RX ADMIN — Medication 112 MICROGRAM(S): at 05:03

## 2022-12-18 RX ADMIN — Medication 4 MILLIGRAM(S): at 05:04

## 2022-12-18 RX ADMIN — Medication 81 MILLIGRAM(S): at 12:26

## 2022-12-18 RX ADMIN — Medication 125 MICROGRAM(S): at 05:04

## 2022-12-18 RX ADMIN — Medication 4 MILLIGRAM(S): at 15:05

## 2022-12-18 RX ADMIN — SODIUM CHLORIDE 3 MILLILITER(S): 9 INJECTION INTRAMUSCULAR; INTRAVENOUS; SUBCUTANEOUS at 21:44

## 2022-12-18 RX ADMIN — SENNA PLUS 2 TABLET(S): 8.6 TABLET ORAL at 21:49

## 2022-12-18 RX ADMIN — PANTOPRAZOLE SODIUM 40 MILLIGRAM(S): 20 TABLET, DELAYED RELEASE ORAL at 05:03

## 2022-12-18 RX ADMIN — SODIUM CHLORIDE 3 MILLILITER(S): 9 INJECTION INTRAMUSCULAR; INTRAVENOUS; SUBCUTANEOUS at 14:00

## 2022-12-18 NOTE — PROGRESS NOTE ADULT - PROBLEM SELECTOR PLAN 1
Readmitted 12/8 for tachycardia, SOB, mediastinal chest tube site drainage.  Reportedly, 1L serosang drainage from mediastinal chest tube site with ostomy bag placed over site.  Suspected pericardial effusion.   TTE on arrival showed EF 30-35%, trivial pericardial effusion.  Patient remains Hemodynamically stable.  Dispo: Rehab  Patient amenable to [facility]. Plan for potential discharge tomorrow 12/19.   Plan to be discussed / reviewed with CT Surgery attending / team during AM rounds.

## 2022-12-18 NOTE — PROGRESS NOTE ADULT - SUBJECTIVE AND OBJECTIVE BOX
POD #21 s/p CABG X 3 (LIMA-LAD, SVG-OM1, SVG-OM2), Mitral valve replacement (#27 Epic Plus Valve), and left atrial appendage clipping (45 V Clip)  Readmitted 12/8 with drainage from former chest tube site, and Left pleural effusion    PAST MEDICAL & SURGICAL HISTORY:  Hypertension  Hyperlipidemia  Hypothyroid  Breast cancer  History of mitral valve prolapse  S/P mastectomy, left    FAMILY HISTORY:  Family history of systemic lupus erythematosus (Child)  Family history of CVA (Grandparent)    Brief Hospital Course: 82F PMH of CAD, MVP, LBBB, Atrial Fibrillation, HTN, HLD, Hypothyroidism, Breast CA s/p b/l mastectomy, originally admitted to [facility] after experiencing acute onset chest pain, found to have NSTEMI, s/p cardiac cath that revealed triple vessel CAD with IABP placed and TTE showed severe MR. Patient transferred to [facility] ICU for surgical evaluation and medical management on 11/23. Patient underwent CABG X 3, MVR(t), DEVONTE Clip on 11/27/22. IABP removed 11/28. Postoperative course significant for cardiogenic shock requiring Primacor, ABLA, CHAVO. Patient was discharged to [facility] on 12/6 developed SOB, tachycardia, and copious drainage from CT site requiring readmission 12/8 now s/p Left pigtail catheter placed for a Left pleural effusion 12/9. Hospital course significant for rapid atrial fibrillation (PO Dig started, patient converted back to NSR, remains on Eliquis 2.5BID). Left chest tube removed 12/17.     Significant recent/past 24 hr events: No overnight events reported.    Subjective: Patient lying in bed in NAD. +Tolerating diet. +Passing BMs. +Pain currently controlled. Denies fevers, chills, lightheadedness, dizziness, HA, CP, palpitations, SOB, cough, abdominal pain, N/V, diarrhea, numbness/tingling in extremities, or any other acute complaints. ROS negative x 10 systems except as noted above.    MEDICATIONS  (STANDING):  apixaban 2.5 milliGRAM(s) Oral two times a day  ascorbic acid 500 milliGRAM(s) Oral daily  aspirin enteric coated 81 milliGRAM(s) Oral daily  atorvastatin 20 milliGRAM(s) Oral at bedtime  colchicine 0.6 milliGRAM(s) Oral every 12 hours  digoxin     Tablet 125 MICROGram(s) Oral daily  ferrous    sulfate 325 milliGRAM(s) Oral daily  levothyroxine 112 MICROGram(s) Oral daily  lidocaine   4% Patch 1 Patch Transdermal daily  megestrol Suspension 400 milliGRAM(s) Oral daily  methylPREDNISolone 4 milliGRAM(s) Oral at bedtime  methylPREDNISolone   Oral   methylPREDNISolone 4 milliGRAM(s) Oral before breakfast  methylPREDNISolone 4 milliGRAM(s) Oral after lunch  metoprolol tartrate 50 milliGRAM(s) Oral two times a day  pantoprazole    Tablet 40 milliGRAM(s) Oral before breakfast  polyethylene glycol 3350 17 Gram(s) Oral daily  senna 2 Tablet(s) Oral at bedtime  sertraline 100 milliGRAM(s) Oral daily  sodium chloride 0.9% lock flush 3 milliLiter(s) IV Push every 8 hours  torsemide 40 milliGRAM(s) Oral daily    MEDICATIONS  (PRN):  acetaminophen     Tablet .. 650 milliGRAM(s) Oral every 6 hours PRN Mild Pain (1 - 3)  ALPRAZolam 0.25 milliGRAM(s) Oral daily PRN anxiety    Allergies:  amiodarone (Hives)  iodinated radiocontrast agents (Vomiting; Headache)    Vitals   T(C): 36.9 (18 Dec 2022 00:00), Max: 36.9 (17 Dec 2022 12:26)  T(F): 98.5 (18 Dec 2022 00:00), Max: 98.5 (18 Dec 2022 00:00)  HR: 75 (18 Dec 2022 00:00) (75 - 101)  BP: 124/72 (18 Dec 2022 00:00) (109/59 - 131/75)  RR: 18 (18 Dec 2022 00:00) (18 - 18)  SpO2: 98% (18 Dec 2022 00:00) (95% - 98%)  O2 Parameters below as of 18 Dec 2022 00:00  Patient On (Oxygen Delivery Method): room air    I&O's Detail    16 Dec 2022 07:01  -  17 Dec 2022 07:00  --------------------------------------------------------  IN:    Oral Fluid: 730 mL  Total IN: 730 mL    OUT:    Chest Tube (mL): 20 mL    Voided (mL): 350 mL  Total OUT: 370 mL    Total NET: 360 mL      17 Dec 2022 07:01  -  18 Dec 2022 01:24  --------------------------------------------------------  IN:    Oral Fluid: 960 mL  Total IN: 960 mL    OUT:    Voided (mL): 1050 mL  Total OUT: 1050 mL    Total NET: -90 mL    Physical Exam  Neuro: A+O x 3, non-focal, speech clear and intact  HEENT:  NCAT, No conjuctival edema or icterus, no thrush.    Neck:  Supple, trachea midline  Pulm: +Diminished BSs at bases bilaterally, no accessory muscle use noted  CV: regular rate, regular rhythm, +S1S2, no murmur or rub noted  Abd: soft, NT, ND, + BS  Ext: MOSHER x 4, +trace-1 LE pitting edema b/l, no cyanosis, distal motor/neuro/circ intact  Skin: warm, dry, perfused  Incisions: midsternal incision open to air C/D/I, sternum stable, LLE harvest site C/D/I     LABS                        10.5   7.36  )-----------( 83       ( 17 Dec 2022 05:36 )             34.3     12-17    137  |  96  |  33.3<H>  ----------------------------<  150<H>  5.2   |  30.0<H>  |  1.21    Ca    9.3      17 Dec 2022 10:46  Mg     2.1     12-17      Last CXR:  < from: Xray Chest 1 View- PORTABLE-Routine (Xray Chest 1 View- PORTABLE-Routine in AM.) (12.15.22 @ 06:17) >  FINDINGS:  CATHETERS AND TUBES: LEFT multi-sidehole pigtail catheter overlies LEFT lower hemithorax.  PULMONARY: LEFT lung clear. No pneumothorax. A RIGHT basilar airspace disease and/or effusion. No pneumothorax.  HEART/VASCULAR: Heart mediastinum within normal limits following cardiac surgery.  BONES: Visualized osseous structures are intact.  IMPRESSION:  LEFT multi-sidehole pigtail catheter overlies LEFT lower hemithorax. An LEFT lung clear. Residual RIGHT mild - moderate effusion and/or basilar airspace disease.  < end of copied text >

## 2022-12-19 ENCOUNTER — TRANSCRIPTION ENCOUNTER (OUTPATIENT)
Age: 82
End: 2022-12-19

## 2022-12-19 VITALS
RESPIRATION RATE: 18 BRPM | HEART RATE: 89 BPM | DIASTOLIC BLOOD PRESSURE: 62 MMHG | SYSTOLIC BLOOD PRESSURE: 102 MMHG | OXYGEN SATURATION: 95 % | TEMPERATURE: 98 F

## 2022-12-19 LAB
ANION GAP SERPL CALC-SCNC: 13 MMOL/L — SIGNIFICANT CHANGE UP (ref 5–17)
BUN SERPL-MCNC: 37.4 MG/DL — HIGH (ref 8–20)
CALCIUM SERPL-MCNC: 9 MG/DL — SIGNIFICANT CHANGE UP (ref 8.4–10.5)
CHLORIDE SERPL-SCNC: 93 MMOL/L — LOW (ref 96–108)
CO2 SERPL-SCNC: 28 MMOL/L — SIGNIFICANT CHANGE UP (ref 22–29)
CREAT SERPL-MCNC: 1.28 MG/DL — SIGNIFICANT CHANGE UP (ref 0.5–1.3)
EGFR: 42 ML/MIN/1.73M2 — LOW
GLUCOSE SERPL-MCNC: 85 MG/DL — SIGNIFICANT CHANGE UP (ref 70–99)
HCT VFR BLD CALC: 38.4 % — SIGNIFICANT CHANGE UP (ref 34.5–45)
HGB BLD-MCNC: 12 G/DL — SIGNIFICANT CHANGE UP (ref 11.5–15.5)
MAGNESIUM SERPL-MCNC: 2.1 MG/DL — SIGNIFICANT CHANGE UP (ref 1.6–2.6)
MCHC RBC-ENTMCNC: 30.3 PG — SIGNIFICANT CHANGE UP (ref 27–34)
MCHC RBC-ENTMCNC: 31.3 GM/DL — LOW (ref 32–36)
MCV RBC AUTO: 97 FL — SIGNIFICANT CHANGE UP (ref 80–100)
PLATELET # BLD AUTO: 160 K/UL — SIGNIFICANT CHANGE UP (ref 150–400)
POTASSIUM SERPL-MCNC: 3.8 MMOL/L — SIGNIFICANT CHANGE UP (ref 3.5–5.3)
POTASSIUM SERPL-SCNC: 3.8 MMOL/L — SIGNIFICANT CHANGE UP (ref 3.5–5.3)
RBC # BLD: 3.96 M/UL — SIGNIFICANT CHANGE UP (ref 3.8–5.2)
RBC # FLD: 18.6 % — HIGH (ref 10.3–14.5)
SODIUM SERPL-SCNC: 134 MMOL/L — LOW (ref 135–145)
WBC # BLD: 4.57 K/UL — SIGNIFICANT CHANGE UP (ref 3.8–10.5)
WBC # FLD AUTO: 4.57 K/UL — SIGNIFICANT CHANGE UP (ref 3.8–10.5)

## 2022-12-19 PROCEDURE — 81001 URINALYSIS AUTO W/SCOPE: CPT

## 2022-12-19 PROCEDURE — 86850 RBC ANTIBODY SCREEN: CPT

## 2022-12-19 PROCEDURE — 36415 COLL VENOUS BLD VENIPUNCTURE: CPT

## 2022-12-19 PROCEDURE — U0005: CPT

## 2022-12-19 PROCEDURE — 84484 ASSAY OF TROPONIN QUANT: CPT

## 2022-12-19 PROCEDURE — 93005 ELECTROCARDIOGRAM TRACING: CPT

## 2022-12-19 PROCEDURE — 80162 ASSAY OF DIGOXIN TOTAL: CPT

## 2022-12-19 PROCEDURE — 85025 COMPLETE CBC W/AUTO DIFF WBC: CPT

## 2022-12-19 PROCEDURE — 83735 ASSAY OF MAGNESIUM: CPT

## 2022-12-19 PROCEDURE — 86900 BLOOD TYPING SEROLOGIC ABO: CPT

## 2022-12-19 PROCEDURE — 80076 HEPATIC FUNCTION PANEL: CPT

## 2022-12-19 PROCEDURE — 80053 COMPREHEN METABOLIC PANEL: CPT

## 2022-12-19 PROCEDURE — 85730 THROMBOPLASTIN TIME PARTIAL: CPT

## 2022-12-19 PROCEDURE — 85027 COMPLETE CBC AUTOMATED: CPT

## 2022-12-19 PROCEDURE — 71045 X-RAY EXAM CHEST 1 VIEW: CPT

## 2022-12-19 PROCEDURE — 97116 GAIT TRAINING THERAPY: CPT

## 2022-12-19 PROCEDURE — 86901 BLOOD TYPING SEROLOGIC RH(D): CPT

## 2022-12-19 PROCEDURE — 93306 TTE W/DOPPLER COMPLETE: CPT

## 2022-12-19 PROCEDURE — 97163 PT EVAL HIGH COMPLEX 45 MIN: CPT

## 2022-12-19 PROCEDURE — 71045 X-RAY EXAM CHEST 1 VIEW: CPT | Mod: 26

## 2022-12-19 PROCEDURE — U0003: CPT

## 2022-12-19 PROCEDURE — 82550 ASSAY OF CK (CPK): CPT

## 2022-12-19 PROCEDURE — 85610 PROTHROMBIN TIME: CPT

## 2022-12-19 PROCEDURE — 83605 ASSAY OF LACTIC ACID: CPT

## 2022-12-19 PROCEDURE — 99024 POSTOP FOLLOW-UP VISIT: CPT

## 2022-12-19 PROCEDURE — 99233 SBSQ HOSP IP/OBS HIGH 50: CPT

## 2022-12-19 PROCEDURE — 83880 ASSAY OF NATRIURETIC PEPTIDE: CPT

## 2022-12-19 PROCEDURE — 80048 BASIC METABOLIC PNL TOTAL CA: CPT

## 2022-12-19 PROCEDURE — 97110 THERAPEUTIC EXERCISES: CPT

## 2022-12-19 RX ORDER — METOPROLOL TARTRATE 50 MG
1 TABLET ORAL
Qty: 0 | Refills: 0 | DISCHARGE
Start: 2022-12-19

## 2022-12-19 RX ORDER — DIGOXIN 250 MCG
1 TABLET ORAL
Qty: 0 | Refills: 0 | DISCHARGE
Start: 2022-12-19

## 2022-12-19 RX ORDER — METOPROLOL TARTRATE 50 MG
1 TABLET ORAL
Qty: 0 | Refills: 0 | DISCHARGE

## 2022-12-19 RX ADMIN — SODIUM CHLORIDE 3 MILLILITER(S): 9 INJECTION INTRAMUSCULAR; INTRAVENOUS; SUBCUTANEOUS at 06:38

## 2022-12-19 RX ADMIN — Medication 125 MICROGRAM(S): at 06:42

## 2022-12-19 RX ADMIN — PANTOPRAZOLE SODIUM 40 MILLIGRAM(S): 20 TABLET, DELAYED RELEASE ORAL at 08:01

## 2022-12-19 RX ADMIN — SODIUM CHLORIDE 3 MILLILITER(S): 9 INJECTION INTRAMUSCULAR; INTRAVENOUS; SUBCUTANEOUS at 13:02

## 2022-12-19 RX ADMIN — Medication 500 MILLIGRAM(S): at 09:03

## 2022-12-19 RX ADMIN — Medication 40 MILLIGRAM(S): at 06:43

## 2022-12-19 RX ADMIN — APIXABAN 2.5 MILLIGRAM(S): 2.5 TABLET, FILM COATED ORAL at 06:42

## 2022-12-19 RX ADMIN — POLYETHYLENE GLYCOL 3350 17 GRAM(S): 17 POWDER, FOR SOLUTION ORAL at 09:03

## 2022-12-19 RX ADMIN — Medication 50 MILLIGRAM(S): at 06:42

## 2022-12-19 RX ADMIN — Medication 112 MICROGRAM(S): at 06:42

## 2022-12-19 RX ADMIN — Medication 81 MILLIGRAM(S): at 09:03

## 2022-12-19 RX ADMIN — Medication 4 MILLIGRAM(S): at 06:43

## 2022-12-19 RX ADMIN — Medication 325 MILLIGRAM(S): at 09:03

## 2022-12-19 NOTE — PROGRESS NOTE ADULT - ASSESSMENT
82F PMH of CAD, MVP, LBBB, Atrial Fibrillation, HTN, HLD, Hypothyroidism, Breast CA s/p b/l mastectomy, originally admitted to [facility] after experiencing acute onset chest pain, found to have NSTEMI, s/p cardiac cath that revealed triple vessel CAD with IABP placed and TTE showed severe MR. Patient transferred to [facility] ICU for surgical evaluation and medical management on 11/23. Patient underwent CABG X 3, MVR(t), DEVONTE Clip on 11/27/22. IABP removed 11/28. Postoperative course significant for cardiogenic shock requiring Primacor, ABLA, CHAVO. Patient was discharged to [facility] on 12/6. Now readmitted 12/8 with tachycardia, SOB, and high output serosanguinous drainage from prior mediastinal CT site. Left pigtail catheter placed for a Left pleural effusion 12/9. 
82F PMH of CAD, MVP, LBBB, Atrial Fibrillation, HTN, HLD, Hypothyroidism, Breast CA s/p b/l mastectomy, originally admitted to [facility] after experiencing acute onset chest pain, found to have NSTEMI, s/p cardiac cath that revealed triple vessel CAD with IABP placed and TTE showed severe MR.  Patient underwent CABG X 3, MVR(t), DEVONTE Clip on 11/27/22. Postoperative course significant for cardiogenic shock requiring Primacor, ABLA, CHAVO. Patient was discharged to [facility] on 12/6 developed SOB, tachycardia, and copious drainage from CT site requiring readmission 12/8 now s/p Left pigtail catheter placed for a Left pleural effusion 12/9. Hospital course significant for rapid atrial fibrillation (PO Dig started, patient converted back to NSR, remains on Eliquis 2.5BID).
82F PMH of CAD, MVP, LBBB, Atrial Fibrillation, HTN, HLD, Hypothyroidism, Breast CA s/p b/l mastectomy, originally admitted to [facility] after experiencing acute onset chest pain, found to have NSTEMI, s/p cardiac cath that revealed triple vessel CAD with IABP placed and TTE showed severe MR.  Patient underwent CABG X 3, MVR(t), DEVONTE Clip on 11/27/22. Postoperative course significant for cardiogenic shock requiring Primacor, ABLA, CHAVO. Patient was discharged to [facility] on 12/6 developed SOB, tachycardia, and copious drainage from CT site requiring readmission 12/8 now s/p Left pigtail catheter placed for a Left pleural effusion 12/9. Hospital course significant for rapid atrial fibrillation (PO Dig started, patient converted back to NSR, remains on Eliquis 2.5BID). Left chest tube removed 12/17. 
82F PMH of CAD, MVP, LBBB, Atrial Fibrillation, HTN, HLD, Hypothyroidism, Breast CA s/p b/l mastectomy, originally admitted to [facility] after experiencing acute onset chest pain, found to have NSTEMI, s/p cardiac cath that revealed triple vessel CAD with IABP placed and TTE showed severe MR.  Patient underwent CABG X 3, MVR(t), DEVONTE Clip on 11/27/22. Postoperative course significant for cardiogenic shock requiring Primacor, ABLA, CHAVO. Patient was discharged to [facility] on 12/6 developed SOB, tachycardia, and copious drainage from CT site requiring readmission 12/8 now s/p Left pigtail catheter placed for a Left pleural effusion 12/9. Hospital course significant for rapid atrial fibrillation (PO Dig started, patient converted back to NSR, remains on Eliquis 2.5BID). Left chest tube removed 12/17. 
82F PMH of CAD, MVP, LBBB, Atrial Fibrillation, HTN, HLD, Hypothyroidism, Breast CA s/p b/l mastectomy, originally admitted to [facility] after experiencing acute onset chest pain, found to have NSTEMI, s/p cardiac cath that revealed triple vessel CAD with IABP placed and TTE showed severe MR.  Patient underwent CABG X 3, MVR(t), DEVONTE Clip on 11/27/22. Postoperative course significant for cardiogenic shock requiring Primacor, ABLA, CHAVO. Patient was discharged to [facility] on 12/6 developed SOB, tachycardia, and copious drainage from CT site requiring readmission 12/8 now s/p Left pigtail catheter placed for a Left pleural effusion 12/9. 
82F PMH of CAD, MVP, LBBB, Atrial Fibrillation, HTN, HLD, Hypothyroidism, Breast CA s/p b/l mastectomy, originally admitted to [facility] after experiencing acute onset chest pain, found to have NSTEMI, s/p cardiac cath that revealed triple vessel CAD with IABP placed and TTE showed severe MR.  Patient underwent CABG X 3, MVR(t), DEVONTE Clip on 11/27/22. Postoperative course significant for cardiogenic shock requiring Primacor, ABLA, CHAVO. Patient was discharged to [facility] on 12/6 developed SOB, tachycardia, and copious drainage from CT site requiring readmission 12/8 now s/p Left pigtail catheter placed for a Left pleural effusion 12/9. Hospital course significant for rapid atrial fibrillation (PO Dig started, patient converted back to NSR, remains on Eliquis 2.5BID).
82F PMH of CAD, MVP, LBBB, Atrial Fibrillation, HTN, HLD, Hypothyroidism, Breast CA s/p b/l mastectomy, originally admitted to [facility] after experiencing acute onset chest pain, found to have NSTEMI, s/p cardiac cath that revealed triple vessel CAD with IABP placed and TTE showed severe MR.  Patient underwent CABG X 3, MVR(t), DEVONTE Clip on 11/27/22. Postoperative course significant for cardiogenic shock requiring Primacor, ABLA, CHAVO. Patient was discharged to [facility] on 12/6 developed SOB, tachycardia, and copious drainage from CT site requiring readmission 12/8 now s/p Left pigtail catheter placed for a Left pleural effusion 12/9. 
82F PMH of CAD, MVP, LBBB, Atrial Fibrillation, HTN, HLD, Hypothyroidism, Breast CA s/p b/l mastectomy, originally admitted to [facility] after experiencing acute onset chest pain, found to have NSTEMI, s/p cardiac cath that revealed triple vessel CAD with IABP placed and TTE showed severe MR. Patient transferred to [facility] ICU for surgical evaluation and medical management on 11/23. Patient underwent CABG X 3, MVR(t), DEVONTE Clip on 11/27/22. IABP removed 11/28. Postoperative course significant for cardiogenic shock requiring Primacor, ABLA, CHAVO. Patient was discharged to [facility] on 12/6. Now readmitted 12/8 with tachycardia, SOB, and high output serosanguinous drainage from prior mediastinal CT site.   
82F PMH of CAD, MVP, LBBB, Atrial Fibrillation, HTN, HLD, Hypothyroidism, Breast CA s/p b/l mastectomy, originally admitted to [facility] after experiencing acute onset chest pain, found to have NSTEMI, s/p cardiac cath that revealed triple vessel CAD with IABP placed and TTE showed severe MR.  Patient underwent CABG X 3, MVR(t), DEVONTE Clip on 11/27/22. Postoperative course significant for cardiogenic shock requiring Primacor, ABLA, CHAVO. Patient was discharged to [facility] on 12/6 developed SOB, tachycardia, and copious drainage from CT site requiring readmission 12/8 now s/p Left pigtail catheter placed for a Left pleural effusion 12/9. 
82F PMH of CAD, MVP, LBBB, Atrial Fibrillation, HTN, HLD, Hypothyroidism, Breast CA s/p b/l mastectomy, originally admitted to [facility] after experiencing acute onset chest pain, found to have NSTEMI, s/p cardiac cath that revealed triple vessel CAD with IABP placed and TTE showed severe MR.  Patient underwent CABG X 3, MVR(t), DEVONTE Clip on 11/27/22. Postoperative course significant for cardiogenic shock requiring Primacor, ABLA, CHAVO. Patient was discharged to [facility] on 12/6 developed SOB, tachycardia, and copious drainage from CT site requiring readmission 12/8 now s/p Left pigtail catheter placed for a Left pleural effusion 12/9. Hospital course significant for rapid atrial fibrillation (PO Dig started, patient converted back to NSR, remains on Eliquis 2.5BID).
82F PMH of CAD, MVP, LBBB, Atrial Fibrillation, HTN, HLD, Hypothyroidism, Breast CA s/p b/l mastectomy, originally admitted to [facility] after experiencing acute onset chest pain, found to have NSTEMI, s/p cardiac cath that revealed triple vessel CAD with IABP placed and TTE showed severe MR. Patient transferred to [facility] ICU for surgical evaluation and medical management on 11/23. Patient underwent CABG X 3, MVR(t), DEVONTE Clip on 11/27/22. IABP removed 11/28. Postoperative course significant for cardiogenic shock requiring Primacor, ABLA, CHAVO. Patient was discharged to [facility] on 12/6. Now readmitted 12/8 with tachycardia, SOB, and high output serosanguinous drainage from prior mediastinal CT site.

## 2022-12-19 NOTE — DISCHARGE NOTE PROVIDER - NSDCFUSCHEDAPPT_GEN_ALL_CORE_FT
Amor Barros  [facility]  CTS[facility] [address]  Scheduled Appointment: 12/21/2022

## 2022-12-19 NOTE — PROGRESS NOTE ADULT - PROBLEM SELECTOR PLAN 6
POST-OP DIAGNOSIS:  CAD (coronary artery disease) 19-Dec-2022 14:58:43  Martina Bragg   TTE 12/5 EF 35-40%.  TTE 12/8 EF 30-35%.  Continue Torsemide 40mg QD for diuresis. Monitor strict I/Os. Supplement electrolytes to maintain K>4 and Mg>2.   Discuss starting GDMT including ACE/ARB as BP allows.

## 2022-12-19 NOTE — DISCHARGE NOTE PROVIDER - DETAILS OF MALNUTRITION DIAGNOSIS/DIAGNOSES
This patient has been assessed with a concern for Malnutrition and was treated during this hospitalization for the following Nutrition diagnosis/diagnoses:     -  12/15/2022: Moderate protein-calorie malnutrition

## 2022-12-19 NOTE — DISCHARGE NOTE PROVIDER - NSDCMRMEDTOKEN_GEN_ALL_CORE_FT
acetaminophen 325 mg oral tablet: 2 tab(s) orally every 6 hours, As needed, Temp greater or equal to 38C (100.4F), Mild Pain (1 - 3)  apixaban 2.5 mg oral tablet: 1 tab(s) orally every 12 hours  ascorbic acid 500 mg oral tablet: 1 tab(s) orally once a day  aspirin 81 mg oral delayed release tablet: 1 tab(s) orally once a day  digoxin 125 mcg (0.125 mg) oral tablet: 1 tab(s) orally once a day  ferrous sulfate 325 mg (65 mg elemental iron) oral tablet: 1 tab(s) orally once a day  Lipitor 20 mg oral tablet: 1 tab(s) orally once a day  megestrol 40 mg/mL oral suspension: 10 milliliter(s) orally once a day  methylPREDNISolone: 4 milligram(s) orally 2 times a day    Dosinmg oral at bedtime   4mg with breakfast    then STOP   metoprolol tartrate 50 mg oral tablet: 1 tab(s) orally 2 times a day  polyethylene glycol 3350 oral powder for reconstitution: 17 gram(s) orally once a day  potassium chloride 20 mEq oral tablet, extended release: 2 tab(s) orally once  Protonix 40 mg oral delayed release tablet: 1 tab(s) orally once a day  senna leaf extract oral tablet: 2 tab(s) orally once a day (at bedtime)  Synthroid 112 mcg (0.112 mg) oral tablet: 1 tab(s) orally once a day  torsemide 40 mg oral tablet: 1 tab(s) orally once a day  Zoloft 100 mg oral tablet: 1 tab(s) orally once a day

## 2022-12-19 NOTE — PROGRESS NOTE ADULT - PROBLEM SELECTOR PLAN 8
Resume home dose synthroid.
cont synthroid.
Continue synthroid.
Continue synthroid.
cont synthroid.
cont synthroid.
Continue synthroid.
Resume home dose synthroid
Resume home dose synthroid
repeat

## 2022-12-19 NOTE — PROGRESS NOTE ADULT - SUBJECTIVE AND OBJECTIVE BOX
Patient ambulated to bathroom with RN.  Making progress with PT.   Pending AR today.     REVIEW OF SYSTEMS  Constitutional - No fever,  +fatigue  HEENT - No vertigo, No neck pain  Neurological - No headaches, No memory loss, +loss of strength, No numbness, No tremors    FUNCTIONAL PROGRESS  12/15 PT  Sit-Stand Transfer Training  Transfer Training Sit-to-Stand Transfer: minimum assist (75% patient effort);  1 person assist;  nonverbal cues (demo/gestures);  verbal cues;  full weight-bearing   rolling walker  Transfer Training Stand-to-Sit Transfer: minimum assist (75% patient effort);  nonverbal cues (demo/gestures);  verbal cues;  1 person assist;  full weight-bearing   rolling walker  Sit-to-Stand Transfer Training Transfer Safety Analysis: decreased step length;  decreased strength    Gait Training  Gait Trainin feet;  contact guard;  1 person assist;  nonverbal cues (demo/gestures);  verbal cues;  full weight-bearing   rolling walker  Gait Analysis: 3-point gait   decreased valentin;  decreased hip/knee flexion;  increased time in double stance;  decreased step length;  decreased stride length;  decreased strength    VITALS  T(C): 37.1 (22 @ 08:04), Max: 37.1 (22 @ 06:37)  HR: 75 (22 @ 08:04) (75 - 90)  BP: 111/67 (22 @ 08:04) (104/63 - 126/71)  RR: 18 (22 @ 08:04) (15 - 18)  SpO2: 97% (22 @ 08:04) (95% - 100%)  Wt(kg): --    MEDICATIONS   acetaminophen     Tablet .. 650 milliGRAM(s) every 6 hours PRN  apixaban 2.5 milliGRAM(s) two times a day  ascorbic acid 500 milliGRAM(s) daily  aspirin enteric coated 81 milliGRAM(s) daily  atorvastatin 20 milliGRAM(s) at bedtime  benzocaine 15 mG/menthol 3.6 mG Lozenge 1 Lozenge every 8 hours PRN  digoxin     Tablet 125 MICROGram(s) daily  ferrous    sulfate 325 milliGRAM(s) daily  levothyroxine 112 MICROGram(s) daily  lidocaine   4% Patch 1 Patch daily  megestrol Suspension 400 milliGRAM(s) daily  methylPREDNISolone 4 milliGRAM(s) before breakfast  methylPREDNISolone 4 milliGRAM(s) at bedtime  methylPREDNISolone     metoprolol tartrate 50 milliGRAM(s) two times a day  pantoprazole    Tablet 40 milliGRAM(s) before breakfast  polyethylene glycol 3350 17 Gram(s) daily  senna 2 Tablet(s) at bedtime  sertraline 100 milliGRAM(s) daily  sodium chloride 0.9% lock flush 3 milliLiter(s) every 8 hours  torsemide 40 milliGRAM(s) daily      RECENT LABS/IMAGING                          12.0   4.57  )-----------( 160      ( 19 Dec 2022 07:00 )             38.4         134<L>  |  93<L>  |  37.4<H>  ----------------------------<  85  3.8   |  28.0  |  1.28    Ca    9.0      19 Dec 2022 07:00  Mg     2.1                         CXR  - Small pleural effusions. Left apical pneumothorax as noted.    CXR  - LEFT multi-sidehole pigtail catheter overlies LEFT lower hemithorax. An LEFT lung clear. Residual RIGHT mild - moderate effusion and/or basilar airspace disease.    ----------------------------------------------------------------------------------------  PHYSICAL EXAM  Constitutional - NAD, Comfortable  Extremities - Mild BLE swelling   Neurologic Exam -                    Cognitive - AAO to self, place, date, year, situation     FUNCTIONAL MOTOR EXAM - No focal deficits     Sensory - Intact to LT  Psychiatric - Stable  ----------------------------------------------------------------------------------------  ASSESSMENT/PLAN  82yFemale with functional deficits after developing chest wound drainage from previous surgical admission  Cardiac/CABG/MV/AFIB - Eliquis, ASA, Demadex, Lopressor, Lipitor, Digoxin  Pulm - Medrol  Mood - Xanax, Zoloft   Pain - Tylenol, Lidoderm, Oxycodone  DVT PPX - SCDs  Rehab - Patient was unable to complete her [place] AR course. Recommend patient returns to AR [facility] when medically/surgically cleared. Continue to recommend ACUTE inpatient rehabilitation for the functional deficits consisting of 3 hours of therapy/day & 24 hour RN/daily PMR physician for comorbid medical management. Will continue to follow for ongoing rehab needs and recommendations. Patient will be able to tolerate 3 hours a day.    Continue bedside therapy as well as OOB throughout the day with mobilization throughout the day with staff to maintain cardiopulmonary function and prevention of secondary complications related to debility.     Discussed with rehab clinical team.

## 2022-12-19 NOTE — PROGRESS NOTE ADULT - PROBLEM SELECTOR PLAN 9
Eliquis and SCDs for DVT prophylaxis.  Protonix for GI prophylaxis.

## 2022-12-19 NOTE — PROGRESS NOTE ADULT - SUBJECTIVE AND OBJECTIVE BOX
Brief summary:  82yFemale Readmitted w/ drainage from Mediatinal CT site , now s/p left PTC placement  and removed .    SUBJECTIVE:  Pt in bed alert and oriented, pt states, " I am improving everyday"  Patient denies acute pain with radiating or aggravating factors.  She denies chest pain, shortness of breath, palpitations, headache, dizziness, nausea, or vomiting.    Overnight events:  no significant overnight events     PAST MEDICAL & SURGICAL HISTORY:  Hypertension      Hyperlipidemia      Hypothyroid      Breast cancer      History of mitral valve prolapse      S/P mastectomy, left      S/P CABG x 3      S/P MVR (mitral valve replacement)          MEDICATIONS  acetaminophen     Tablet .. 650 milliGRAM(s) Oral every 6 hours PRN  apixaban 2.5 milliGRAM(s) Oral two times a day  ascorbic acid 500 milliGRAM(s) Oral daily  aspirin enteric coated 81 milliGRAM(s) Oral daily  atorvastatin 20 milliGRAM(s) Oral at bedtime  benzocaine 15 mG/menthol 3.6 mG Lozenge 1 Lozenge Oral every 8 hours PRN  digoxin     Tablet 125 MICROGram(s) Oral daily  ferrous    sulfate 325 milliGRAM(s) Oral daily  levothyroxine 112 MICROGram(s) Oral daily  lidocaine   4% Patch 1 Patch Transdermal daily  megestrol Suspension 400 milliGRAM(s) Oral daily  methylPREDNISolone   Oral   methylPREDNISolone 4 milliGRAM(s) Oral before breakfast  methylPREDNISolone 4 milliGRAM(s) Oral at bedtime  metoprolol tartrate 50 milliGRAM(s) Oral two times a day  pantoprazole    Tablet 40 milliGRAM(s) Oral before breakfast  polyethylene glycol 3350 17 Gram(s) Oral daily  senna 2 Tablet(s) Oral at bedtime  sertraline 100 milliGRAM(s) Oral daily  sodium chloride 0.9% lock flush 3 milliLiter(s) IV Push every 8 hours  torsemide 40 milliGRAM(s) Oral daily  MEDICATIONS  (PRN):  acetaminophen     Tablet .. 650 milliGRAM(s) Oral every 6 hours PRN Mild Pain (1 - 3)  benzocaine 15 mG/menthol 3.6 mG Lozenge 1 Lozenge Oral every 8 hours PRN Sore Throat    Height (cm): 160 (12-18 @ 21:31)  Daily Height in cm: 160.02 (18 Dec 2022 21:31)    Daily Weight in k.2 (18 Dec 2022 04:28)                              10.4   4.27  )-----------( 110      ( 18 Dec 2022 06:35 )             32.7       138  |  99  |  33.1<H>  ----------------------------<  100<H>  4.2   |  26.0  |  1.06    Ca    9.0      18 Dec 2022 06:35  Mg     2.4                   Objective:  T(C): 37 (22 @ 21:42), Max: 37.1 (22 @ 08:05)  HR: 77 (22 @ 21:42) (75 - 90)  BP: 104/63 (22 @ 21:42) (104/63 - 125/70)  RR: 16 (22 @ 21:42) (15 - 18)  SpO2: 95% (22 @ 21:42) (95% - 100%)  Wt(kg): --CAPILLARY BLOOD GLUCOSE      I&O's Summary    17 Dec 2022 07:01  -  18 Dec 2022 07:00  --------------------------------------------------------  IN: 1080 mL / OUT: 1050 mL / NET: 30 mL    18 Dec 2022 07:01  -  19 Dec 2022 01:45  --------------------------------------------------------  IN: 960 mL / OUT: 950 mL / NET: 10 mL        Physical Exam  Neuro: A+O x 3, non-focal, speech clear and intact  HEENT:  NCAT, PERRL, EOMI. No conjuctival edema or icterus, no thrush.  Neck: supple, trachea midline  Pulm: diminished b/l at the bases, no rales/rhonchi/wheezing, no accessory muscle use noted  CV: regular rate, regular rhythm, +S1S2  Abd: soft, NT, ND, + BS  Ext: MOSHER x 4, no edema, no cyanosis or clubbing, 2+ DP b/l, distal motor/neuro/circ intact.   Inc: MSI ROSARIO C/D/I/stable no click healing well, LLE ROSARIO C/D/I   Chest tubes: Left chest tube c/d/i with dressing, chest tube removed ,

## 2022-12-19 NOTE — PROGRESS NOTE ADULT - PROBLEM SELECTOR PLAN 2
s/p CABG X 3, MVR(t), DEVONTE Clip on 11/27/22.  Continue ASA, Statin, and Lopressor as tolerated by HR and BP.
s/p CABG X 3, MVR(t), DEVONTE Clip on 11/27/22.  Continue ASA, Statin, and Lopressor as tolerated by HR and BP.
Bilateral pleural effusions with Left pigtail catheter placed 12/9.  Left chest tube removed 12/17.   Follow up AM CXR.   Continue Torsemide 40mg QD for diuresis. Monitor strict I/Os.  Continue Colchicine and medrol dose pack for high chest tube output.   Continue Deep breathing and IS therapy.  Daily chest PT.
s/p CABG X 3, MVR(t), DEVONTE Clip on 11/27/22  Discharged to [facility] 12/6   Continue ASA, Lopressor, Statin
s/p CABG X 3, MVR(t), DEVONTE Clip on 11/27/22  Discharged to [facility] 12/6   Continue ASA, Lopressor, Statin
s/p CABG X 3, MVR(t), DEVONTE Clip on 11/27/22.  Continue ASA, Statin, and Lopressor as tolerated by HR and BP.
Bilateral pleural effusions with Left pigtail catheter placed 12/9.  Daily CXR while CT in place.  Monitor output.  Continue Torsemide 40mg QD for diuresis. Monitor strict I/Os.  Continue Colchicine and medrol dose pack for high chest tube output.   Continue Deep breathing and IS therapy.  Daily chest PT.
Bilateral pleural effusions with Left pigtail catheter placed 12/9.  Daily CXR while CT in place.  Possible removal of left chest tube today if output remains minimal.   Monitor output.  Continue Torsemide 40mg QD for diuresis. Monitor strict I/Os.  Continue Colchicine and medrol dose pack for high chest tube output.   Continue Deep breathing and IS therapy.  Daily chest PT.
s/p CABG X 3, MVR(t), DEVONTE Clip on 11/27/22.  Discharged to [facility] 12/6.  Continue ASA, Statin, and Lopressor as tolerated by HR and BP.
Bilateral pleural effusions with Left pigtail catheter placed 12/9.  Daily CXR while CT in place.  Monitor output.  Continue Torsemide 40mg QD for diuresis. Monitor strict I/Os.  Continue Deep breathing and IS therapy.  Daily chest PT.
Bilateral pleural effusions with Left pigtail catheter placed 12/9.  Left chest tube removed 12/17.   Follow up AM CXR.   Continue Torsemide 40mg QD for diuresis. Monitor strict I/Os.  Continue medrol dose pack for high chest tube output.   Continue Deep breathing and IS therapy.  Daily chest PT.

## 2022-12-19 NOTE — PROGRESS NOTE ADULT - REASON FOR ADMISSION
Tachycardia, SOB, CT site drainage

## 2022-12-19 NOTE — PROGRESS NOTE ADULT - PROBLEM SELECTOR PLAN 1
Readmitted 12/8 for tachycardia, SOB, mediastinal chest tube site drainage.  Reportedly, 1L serosang drainage from mediastinal chest tube site with ostomy bag placed over site.  Suspected pericardial effusion.   TTE on arrival showed EF 30-35%, trivial pericardial effusion.  Patient remains Hemodynamically stable.  Dispo: Rehab  Patient amenable to [place] rehab. Plan for potential discharge tomorrow 12/20.   Plan to be discussed / reviewed with CT Surgery attending / team during AM rounds.

## 2022-12-19 NOTE — DISCHARGE NOTE PROVIDER - NSDCFUADDINST_GEN_ALL_CORE_FT
Please call the Cardiothoracic Surgery office at [phone] if you are experiencing any shortness of breath, chest pain, fevers or chills, drainage from the incisions, persistent nausea, vomiting or if you have any questions about your medications. If the symptoms are severe, call 911 and go to the nearest hospital. You can also call [phone] for an emergency [facility] ambulance, which will take you to the closest [facility].    If you need any assistance for making any appointments for a new consult or referral in any specialty, please call our [facility] Clinical Coordination Center at [phone].

## 2022-12-19 NOTE — DISCHARGE NOTE PROVIDER - NSDCCPCAREPLAN_GEN_ALL_CORE_FT
PRINCIPAL DISCHARGE DIAGNOSIS  Diagnosis: Loculated pleural effusion  Assessment and Plan of Treatment: s/p chest tube insertion and medrol dose pack to complete 12/20      SECONDARY DISCHARGE DIAGNOSES  Diagnosis: CAD (coronary artery disease)  Assessment and Plan of Treatment: s/p CABG   follow all instruction outlined in CTS discharge booklet

## 2022-12-19 NOTE — PROGRESS NOTE ADULT - PROBLEM SELECTOR PROBLEM 2
Bilateral pleural effusion
CAD (coronary artery disease)

## 2022-12-19 NOTE — DISCHARGE NOTE PROVIDER - HOSPITAL COURSE
82F PMH of CAD, MVP, LBBB, Atrial Fibrillation, HTN, HLD, Hypothyroidism, Breast CA s/p b/l mastectomy, originally admitted to [facility] after experiencing acute onset chest pain, found to have NSTEMI, s/p cardiac cath that revealed triple vessel CAD with IABP placed and TTE showed severe MR.  Patient underwent CABG X 3, MVR(t), DEVONTE Clip on 11/27/22. Postoperative course significant for cardiogenic shock requiring Primacor, ABLA, CHAVO. Patient was discharged to [facility] on 12/6 developed SOB, tachycardia, and copious drainage from CT site requiring readmission 12/8 now s/p Left pigtail catheter placed for a Left pleural effusion 12/9. Hospital course significant for rapid atrial fibrillation (PO Dig started, patient converted back to NSR, remains on Eliquis 2.5BID). Left chest tube removed 12/17. Patient to complete medrol dose pack tomorrow 12/20 AM. Patient remains stable from respiratory and hemodynamic standpoint. Discussed with Dr. Ackerman and cleared for discharge to Acute Rehab.

## 2022-12-19 NOTE — PROGRESS NOTE ADULT - PROVIDER SPECIALTY LIST ADULT
Critical Care
Rehab Medicine
Critical Care
Rehab Medicine
CT Surgery
Critical Care
Rehab Medicine
CT Surgery

## 2022-12-19 NOTE — DISCHARGE NOTE NURSING/CASE MANAGEMENT/SOCIAL WORK - PATIENT PORTAL LINK FT
You can access the FollowMyHealth Patient Portal offered by [facility] by registering at the following website: [email or web address]. By joining [facility]’s FollowMyHealth portal, you will also be able to view your health information using other applications (apps) compatible with our system.

## 2022-12-19 NOTE — DISCHARGE NOTE PROVIDER - NSDCFUADDAPPT_GEN_ALL_CORE_FT
Upon discharge from rehab, make a follow up appointment with Dr Ackerman by calling [phone].  Follow up with your cardiologist and primary care doctor within 7-10 days after discharge. Sternal wound precautions, daily weights, DASH diet, ensure supplements bid, daily shower,  PT/OT Rehab  per rehab facility. BMP, CBC twice a week. CXR weekly. Vitals per routine.     Your Care Navigator Nurse Practitioner will be in touch to see you in your home within a few days from discharge. The Follow Your Heart program can help ensure you understand your medications, discharge instructions and answer any questions you may have at that time. They are also a great source to address concerns during the day and may be reached at [phone].

## 2022-12-19 NOTE — DISCHARGE NOTE NURSING/CASE MANAGEMENT/SOCIAL WORK - NSDCFUADDAPPT_GEN_ALL_CORE_FT
Upon discharge from rehab, make a follow up appointment with Dr Ackerman by calling [phone].  Follow up with your cardiologist and primary care doctor within 7-10 days after discharge. Sternal wound precautions, daily weights, DASH diet, ensure supplements bid, daily shower,  PT/OT Rehab  per rehab facility. BMP, CBC twice a week. CXR weekly. Vitals per routine.     Your Care Navigator Nurse Practitioner will be in touch to see you in your home within a few days from discharge. The Follow Your Heart program can help ensure you understand your medications, discharge instructions and answer any questions you may have at that time. They are also a great source to address concerns during the day and may be reached at [phone].

## 2022-12-19 NOTE — PROGRESS NOTE ADULT - PROBLEM SELECTOR PROBLEM 1
Pericardial effusion

## 2022-12-20 ENCOUNTER — TRANSCRIPTION ENCOUNTER (OUTPATIENT)
Age: 82
End: 2022-12-20

## 2022-12-20 DIAGNOSIS — Z86.79 PERSONAL HISTORY OF OTHER DISEASES OF THE CIRCULATORY SYSTEM: ICD-10-CM

## 2022-12-20 DIAGNOSIS — Z86.39 PERSONAL HISTORY OF OTHER ENDOCRINE, NUTRITIONAL AND METABOLIC DISEASE: ICD-10-CM

## 2022-12-20 DIAGNOSIS — Z85.3 PERSONAL HISTORY OF MALIGNANT NEOPLASM OF BREAST: ICD-10-CM

## 2022-12-21 ENCOUNTER — TRANSCRIPTION ENCOUNTER (OUTPATIENT)
Age: 82
End: 2022-12-21

## 2022-12-21 ENCOUNTER — APPOINTMENT (OUTPATIENT)
Dept: CARDIOTHORACIC SURGERY | Facility: CLINIC | Age: 82
End: 2022-12-21

## 2022-12-21 NOTE — PATIENT PROFILE ADULT - HOSPITALS/PSYCHIATRIC FACILITIES
[facility]

## 2022-12-23 ENCOUNTER — TRANSCRIPTION ENCOUNTER (OUTPATIENT)
Age: 82
End: 2022-12-23

## 2022-12-28 ENCOUNTER — TRANSCRIPTION ENCOUNTER (OUTPATIENT)
Age: 82
End: 2022-12-28

## 2022-12-30 ENCOUNTER — TRANSCRIPTION ENCOUNTER (OUTPATIENT)
Age: 82
End: 2022-12-30

## 2023-01-03 ENCOUNTER — TRANSCRIPTION ENCOUNTER (OUTPATIENT)
Age: 83
End: 2023-01-03

## 2023-01-03 NOTE — PROCEDURE NOTE - NSINFORMCONSENT_GEN_A_CORE
Requested Prescriptions   Pending Prescriptions Disp Refills     LORazepam (ATIVAN) 1 MG tablet 30 tablet 0     Sig: Take 1 tablet (1 mg) by mouth every 8 hours as needed for anxiety       Next 5 appointments (look out 90 days)    Jan 06, 2023  8:15 AM  Lab visit with PH LAB  [facility] ([facility] ) [address] [place] [id number] [phone]           Routing refill request to provider for review/approval because:  Drug not on the [facility], P or [facility] refill protocol or controlled substance    
Benefits, risks, and possible complications of procedure explained to patient/caregiver who verbalized understanding and gave written consent.
negative - no chest pain

## 2023-01-04 ENCOUNTER — TRANSCRIPTION ENCOUNTER (OUTPATIENT)
Age: 83
End: 2023-01-04

## 2023-01-04 ENCOUNTER — NON-APPOINTMENT (OUTPATIENT)
Age: 83
End: 2023-01-04

## 2023-01-05 ENCOUNTER — TRANSCRIPTION ENCOUNTER (OUTPATIENT)
Age: 83
End: 2023-01-05

## 2023-01-11 ENCOUNTER — APPOINTMENT (OUTPATIENT)
Dept: CARDIOTHORACIC SURGERY | Facility: CLINIC | Age: 83
End: 2023-01-11
Payer: MEDICARE

## 2023-01-11 VITALS
HEART RATE: 70 BPM | SYSTOLIC BLOOD PRESSURE: 124 MMHG | OXYGEN SATURATION: 95 % | BODY MASS INDEX: 20.49 KG/M2 | DIASTOLIC BLOOD PRESSURE: 70 MMHG | WEIGHT: 120 LBS | RESPIRATION RATE: 16 BRPM | HEIGHT: 64 IN

## 2023-01-11 DIAGNOSIS — Z82.49 FAMILY HISTORY OF ISCHEMIC HEART DISEASE AND OTHER DISEASES OF THE CIRCULATORY SYSTEM: ICD-10-CM

## 2023-01-11 DIAGNOSIS — Z83.79 FAMILY HISTORY OF OTHER DISEASES OF THE DIGESTIVE SYSTEM: ICD-10-CM

## 2023-01-11 DIAGNOSIS — Z78.9 OTHER SPECIFIED HEALTH STATUS: ICD-10-CM

## 2023-01-11 PROCEDURE — 99024 POSTOP FOLLOW-UP VISIT: CPT

## 2023-01-11 RX ORDER — SERTRALINE HYDROCHLORIDE 100 MG/1
100 TABLET, FILM COATED ORAL
Refills: 0 | Status: ACTIVE | COMMUNITY

## 2023-01-11 RX ORDER — METOPROLOL TARTRATE 50 MG/1
50 TABLET, FILM COATED ORAL
Refills: 0 | Status: ACTIVE | COMMUNITY

## 2023-01-11 RX ORDER — PANTOPRAZOLE SODIUM 40 MG/1
40 TABLET, DELAYED RELEASE ORAL
Refills: 0 | Status: ACTIVE | COMMUNITY

## 2023-01-11 RX ORDER — ASPIRIN 81 MG/1
81 TABLET ORAL
Refills: 0 | Status: ACTIVE | COMMUNITY

## 2023-01-11 RX ORDER — ATORVASTATIN CALCIUM 20 MG/1
20 TABLET, FILM COATED ORAL
Refills: 0 | Status: ACTIVE | COMMUNITY

## 2023-01-11 RX ORDER — TORSEMIDE 20 MG/1
20 TABLET ORAL
Refills: 0 | Status: ACTIVE | COMMUNITY

## 2023-01-11 RX ORDER — LEVOTHYROXINE SODIUM 0.11 MG/1
112 TABLET ORAL
Refills: 0 | Status: ACTIVE | COMMUNITY

## 2023-01-11 NOTE — REASON FOR VISIT
[Family Member] : family member [de-identified] : Triple vessel coronary artery bypass grafting with the internal mammary artery to the left  anterior descending, saphenous vein graft to obtuse marginal one, saphenous vein graft to obtuse marginal two and replacement of the mitral valve with a #27 Epic Abbott tissue valve as well as clipping of the left atrial appendage with a #45 AtriCure  AtriClip. [de-identified] : 11/27/22 [de-identified] : \par Past medical history includes of CAD, LBBB, HTN, HLD admitted to [facility] after experiencing acute onset chest pain, found to have NSTEMI, s/p cardiac cath that revealed triple vessel CAD with IABP placed, placed on Heparin gtt and loaded with Plavix.  TTE also confirmed Severe MR. Patient transferred to [facility] ICU for surgical evaluation and medical management on 11/23. IABP d/c'd 11/25 with subsequent hypotension, bradycardia, +NSTEMI, with patient urgently brought to cath lab and IABP reinserted in subsequent groin.  Pt transferred to CTICU. \par \par She underwent a CABG X 3, MVR(t), DEVONTE Clip on 11/27/22. IABP removed 11/28. \par \par Postoperative course significant for a slow wean of primacor for cardiogenic shock and levophed for hypotension, ABLA requiring blood, acute hypoxic respiratory failure (since resolved), hypervolemia (resolving with diuretics), and CHAVO due to cardiorenal syndrome (resolved). Patient discharged to [facility] on 12/6. He then developed SOB, tachycardia, and copious drainage from CT site requiring readmission 12/8 now s/p Left pigtail catheter placed for a Left pleural effusion 12/9. Hospital course significant for rapid atrial fibrillation (PO Dig started, patient converted back to NSR, remains on Eliquis 2.5BID). Left chest tube removed 12/17. \par \par She was ultimately discharge to Acute Rehab on 12/19/22 and was discharged 10 days later to home under the care of her daughter. She presents in a wheelchair and is able to walk well with a walker and transfer from chair to exam table with no assistance.\par

## 2023-01-11 NOTE — ASSESSMENT
[FreeTextEntry1] : Ms Jang reports to the office today for post operative care. She is recovering well from her complicated post operative course and is overall well healed. She is continuing with OT PT and is living with her daughter and son in law. She is utilizing a walker for ambulation and reports increasing strength daily. She has an appointment with Dr Basilio for Cardiology in 1 week time. She will continue her care with Cardiology and return as needed. \par \par PLAN:\par - Continue care with Cardiology \par - Return to care as needed

## 2023-01-11 NOTE — PHYSICAL EXAM
[Respiration, Rhythm And Depth] : normal respiratory rhythm and effort [Auscultation Breath Sounds / Voice Sounds] : lungs were clear to auscultation bilaterally [Heart Rate And Rhythm] : heart rate was normal and rhythm regular [Clean] : clean [Dry] : dry [Healing Well] : healing well [No Edema] : no edema

## 2023-03-01 ENCOUNTER — APPOINTMENT (OUTPATIENT)
Dept: CARDIOTHORACIC SURGERY | Facility: CLINIC | Age: 83
End: 2023-03-01
Payer: MEDICARE

## 2023-03-01 VITALS
OXYGEN SATURATION: 97 % | DIASTOLIC BLOOD PRESSURE: 77 MMHG | WEIGHT: 127 LBS | BODY MASS INDEX: 22.5 KG/M2 | HEIGHT: 63 IN | TEMPERATURE: 97.7 F | SYSTOLIC BLOOD PRESSURE: 119 MMHG | HEART RATE: 76 BPM | RESPIRATION RATE: 16 BRPM

## 2023-03-01 PROCEDURE — 99213 OFFICE O/P EST LOW 20 MIN: CPT

## 2023-03-01 RX ORDER — SACUBITRIL AND VALSARTAN 24; 26 MG/1; MG/1
24-26 TABLET, FILM COATED ORAL
Refills: 0 | Status: ACTIVE | COMMUNITY

## 2023-03-01 RX ORDER — POTASSIUM CHLORIDE 1500 MG/1
20 TABLET, FILM COATED, EXTENDED RELEASE ORAL
Refills: 0 | Status: COMPLETED | COMMUNITY
End: 2023-03-01

## 2023-03-01 RX ORDER — CHROMIUM 200 MCG
TABLET ORAL
Refills: 0 | Status: ACTIVE | COMMUNITY

## 2023-03-01 RX ORDER — GLUC/MSM/COLGN2/HYAL/ANTIARTH3 375-375-20
TABLET ORAL
Refills: 0 | Status: ACTIVE | COMMUNITY

## 2023-03-01 RX ORDER — MULTIVIT-MIN/IRON/FOLIC ACID/K 18-600-40
CAPSULE ORAL
Refills: 0 | Status: ACTIVE | COMMUNITY

## 2023-03-01 RX ORDER — DIGOXIN 0.06 MG/1
62.5 TABLET ORAL
Refills: 0 | Status: COMPLETED | COMMUNITY
End: 2023-03-01

## 2023-03-01 RX ORDER — APIXABAN 2.5 MG/1
2.5 TABLET, FILM COATED ORAL
Refills: 0 | Status: COMPLETED | COMMUNITY
End: 2023-03-01

## 2023-03-02 NOTE — ASSESSMENT
[FreeTextEntry1] : Patient presents to appointment today accompanied by her daughter. On physical exam, fluid collection noted at the lower portion of the sternal incision. No fevers or erythema noted. Site cleaned and drained in office; length 3cm; dressing placed to site. We discussed that patient start prophylactic antibiotics, Levofloxacin 500mg daily x 14 days ordered. Advised patient to contact office if she becomes febrile or notes signs/symptoms of infection (red flags of infection reviewed). I recommend that patient return to care in the office in two weeks for evaluation. All questions answered, patient verbalizes understanding.\par \par \par PLAN:\par - Start Levaquin 500mg daily x 14 days.\par - Return to care in the office in two weeks.\par \par \par I, Dr. Ackerman, personally performed the evaluation and management (E/M) services for this established patient who presents today with an existing condition(s).  That E/M includes conducting the examination, assessing all new/exacerbated conditions, and establishing a new plan of care.  Today, my ACP, Jackie Prado NP, was here to observe my evaluation and management services for this new problem/exacerbated condition to be followed going forward.

## 2023-03-02 NOTE — REVIEW OF SYSTEMS
[Feeling Tired] : feeling tired [Chest Pain] : chest pain [SOB on Exertion] : shortness of breath during exertion [Negative] : Heme/Lymph [Skin Lesions] : skin lesion [Fever] : no fever [Chills] : no chills [Palpitations] : no palpitations [Lower Ext Edema] : no lower extremity edema [Shortness Of Breath] : no shortness of breath [Wheezing] : no wheezing [Cough] : no cough [Sleep Disturbances] : no sleep disturbances [Anxiety] : no anxiety [Depression] : no depression [de-identified] : see HPI

## 2023-03-02 NOTE — PHYSICAL EXAM
[Sclera] : the sclera and conjunctiva were normal [Neck Appearance] : the appearance of the neck was normal [] : no respiratory distress [Respiration, Rhythm And Depth] : normal respiratory rhythm and effort [Auscultation Breath Sounds / Voice Sounds] : lungs were clear to auscultation bilaterally [Heart Rate And Rhythm] : heart rate was normal and rhythm regular [Heart Sounds] : normal S1 and S2 [Examination Of The Chest] : the chest was normal in appearance [Abdomen Tenderness] : non-tender [Abnormal Walk] : normal gait [Sensation] : the sensory exam was normal to light touch and pinprick [Motor Exam] : the motor exam was normal [Oriented To Time, Place, And Person] : oriented to person, place, and time [Impaired Insight] : insight and judgment were intact [Affect] : the affect was normal [Mood] : the mood was normal [FreeTextEntry2] : no edema [FreeTextEntry1] : Fluid collection noted at the lower portion of sternal incision.

## 2023-03-02 NOTE — HISTORY OF PRESENT ILLNESS
[FreeTextEntry1] : Yen Jang is a 83 year old female who presents for a follow up appointment regarding sternal incision evaluation. \par \par She is a status post triple vessel coronary artery bypass grafting with the internal mammary artery to the left anterior descending, saphenous vein graft to obtuse marginal one, saphenous vein graft to obtuse marginal two and replacement of the mitral valve with a #27 Epic Abbott tissue valve as well as clipping of the left atrial appendage with a #45 AtriCure AtriClip on 11/27/22.  IABP removed 11/28. \par  \par Past Medical history includes of CAD, LBBB, HTN, HLD admitted to [facility] after experiencing acute onset chest pain, found to have NSTEMI, s/p cardiac cath that revealed triple vessel CAD with IABP placed, placed on Heparin gtt and loaded with Plavix. TTE also confirmed Severe MR. Patient transferred to [facility] ICU for surgical evaluation and medical management on 11/23. IABP d/c'd 11/25 with subsequent hypotension, bradycardia, +NSTEMI, with patient urgently brought to cath lab and IABP reinserted in subsequent groin. Pt transferred to CTICU and above surgery was performed.\par \par Postoperative course significant for CHAVO. Patient discharged to [facility] on 12/6. She then developed SOB, tachycardia, and copious drainage from CT site requiring readmission 12/8 now s/p Left pigtail catheter placed for a Left pleural effusion 12/9. Hospital course significant for rapid atrial fibrillation (PO Dig started, patient converted back to NSR, remains on Eliquis 2.5BID). Left chest tube removed 12/17. \par \par She was ultimately discharge to [facility] on 12/19/22 and was discharged 10 days later to home under the care of her daughter.\par \par Today the patient reports that over the past few days she has experienced some chest pain and noted a fluid collection at the lower portion of her sternal incision. She states that site has been getting larger, and self opened last night and began draining. She denies fevers; she does report some shortness of breath on exertion. She presents for evaluation.

## 2023-03-15 ENCOUNTER — APPOINTMENT (OUTPATIENT)
Dept: CARDIOTHORACIC SURGERY | Facility: CLINIC | Age: 83
End: 2023-03-15
Payer: MEDICARE

## 2023-03-15 PROCEDURE — 99212 OFFICE O/P EST SF 10 MIN: CPT

## 2023-03-17 NOTE — ASSESSMENT
[FreeTextEntry1] : Mid sternal wound well approximated and no evidence of infectious process. She is now discharged from care and may return as needed. \par \par PLAN:\par - Continue care with Cardiology and PCP\par - Return to care as needed\par \par \par \par \par \par \par \par I, Dr. Ackerman, personally performed the evaluation and management (E/M) services for this new patient.  That E/M includes conducting the initial examination, assessing all conditions, and establishing the plan of care.  Today, my ACP, Amor Barros NP was here to observe my evaluation and management services for this patient to be followed going forward.\par \par \par

## 2023-03-17 NOTE — HISTORY OF PRESENT ILLNESS
[FreeTextEntry1] : \par Yen Jang is a 83 year old female who presents for a follow up appointment regarding sternal incision evaluation. \par \par She is a status post triple vessel coronary artery bypass grafting with the internal mammary artery to the left anterior descending, saphenous vein graft to obtuse marginal one, saphenous vein graft to obtuse marginal two and replacement of the mitral valve with a #27 Epic Abbott tissue valve as well as clipping of the left atrial appendage with a #45 AtriCure AtriClip on 11/27/22. IABP removed 11/28. \par  \par Past Medical history includes of CAD, LBBB, HTN, HLD admitted to [facility] after experiencing acute onset chest pain, found to have NSTEMI, s/p cardiac cath that revealed triple vessel CAD with IABP placed, placed on Heparin gtt and loaded with Plavix. TTE also confirmed Severe MR. Patient transferred to [facility] ICU for surgical evaluation and medical management on 11/23. IABP d/c'd 11/25 with subsequent hypotension, bradycardia, +NSTEMI, with patient urgently brought to cath lab and IABP reinserted in subsequent groin. Pt transferred to CTICU and above surgery was performed.\par \par She presents back to the office today for wound care evaluation.

## 2023-03-17 NOTE — REVIEW OF SYSTEMS
[Negative] : Heme/Lymph [Feeling Poorly] : not feeling poorly [Feeling Tired] : not feeling tired [Chest Pain] : no chest pain [Palpitations] : no palpitations

## 2023-03-17 NOTE — DATA REVIEWED
[FreeTextEntry1] : Transthoracic Echocardiogram from 12/08/22\par LVEF 30-35%\par - Entire apex mid and apical anterior wall mid apical inferior septum and mid and apical inferior wall are abnormal.\par - Small RV\par - moderately enlarged left atrium\par - normal right atrial size\par - large pleural effusion in both left and right lateral regions\par - moderate MAC\par - trace MR

## 2023-04-19 ENCOUNTER — APPOINTMENT (OUTPATIENT)
Dept: CARDIOTHORACIC SURGERY | Facility: CLINIC | Age: 83
End: 2023-04-19
Payer: MEDICARE

## 2023-04-19 VITALS
HEART RATE: 92 BPM | HEIGHT: 63 IN | WEIGHT: 127 LBS | BODY MASS INDEX: 22.5 KG/M2 | TEMPERATURE: 98 F | DIASTOLIC BLOOD PRESSURE: 79 MMHG | OXYGEN SATURATION: 96 % | SYSTOLIC BLOOD PRESSURE: 123 MMHG | RESPIRATION RATE: 16 BRPM

## 2023-04-19 PROCEDURE — 99213 OFFICE O/P EST LOW 20 MIN: CPT

## 2023-04-19 RX ORDER — LEVOFLOXACIN 500 MG/1
500 TABLET, FILM COATED ORAL
Qty: 14 | Refills: 0 | Status: ACTIVE | COMMUNITY
Start: 2023-03-01 | End: 1900-01-01

## 2023-04-19 NOTE — PHYSICAL EXAM
[Sclera] : the sclera and conjunctiva were normal [Neck Appearance] : the appearance of the neck was normal [Respiration, Rhythm And Depth] : normal respiratory rhythm and effort [Heart Rate And Rhythm] : heart rate was normal and rhythm regular [Examination Of The Chest] : the chest was normal in appearance [Skin Color & Pigmentation] : normal skin color and pigmentation [Oriented To Time, Place, And Person] : oriented to person, place, and time [Sensation] : the sensory exam was normal to light touch and pinprick [FreeTextEntry1] : 1 cm small superficial openign to distal pole of sternal incision, serous drainage noted

## 2023-04-19 NOTE — REVIEW OF SYSTEMS
[Skin Wound] : skin wound [Negative] : Heme/Lymph [Feeling Poorly] : not feeling poorly [Feeling Tired] : not feeling tired [Chest Pain] : no chest pain [Palpitations] : no palpitations [Lower Ext Edema] : no lower extremity edema [Shortness Of Breath] : no shortness of breath [SOB on Exertion] : no shortness of breath during exertion [de-identified] : 1 cm superficial opening to distal pole of sternal incision

## 2023-04-19 NOTE — HISTORY OF PRESENT ILLNESS
[FreeTextEntry1] : Yen Jang is a 83 year old female who presents for a follow up appointment regarding sternal incision evaluation. \par \par She is a status post triple vessel coronary artery bypass grafting with the internal mammary artery to the left anterior descending, saphenous vein graft to obtuse marginal one, saphenous vein graft to obtuse marginal two and replacement of the mitral valve with a #27 Epic Abbott tissue valve as well as clipping of the left atrial appendage with a #45 AtriCure AtriClip on 11/27/22. IABP removed 11/28. \par  \par Past Medical history includes of CAD, LBBB, HTN, HLD admitted to [facility] after experiencing acute onset chest pain, found to have NSTEMI, s/p cardiac cath that revealed triple vessel CAD with IABP placed, placed on Heparin gtt and loaded with Plavix. TTE also confirmed Severe MR. Patient transferred to [facility] ICU for surgical evaluation and medical management on 11/23. IABP d/c'd 11/25 with subsequent hypotension, bradycardia, +NSTEMI, with patient urgently brought to cath lab and IABP reinserted in subsequent groin. Pt transferred to CTICU and above surgery was performed.\par \par She presents back to the office today for wound care evaluation. \par

## 2023-04-19 NOTE — ASSESSMENT
[FreeTextEntry1] : Ms Jang presents to the office today for a small opening in the skin at the site of her sternal incision. She states that it was healed over and over the past few days she was having some right sided chest pain and noticed that there was small purulent drainage from her distal incision. She has no complaints of fever, chills, or foul drainage but there is some degree of purulence on exam. There does not appear to be any tracking of the wound and it does appear superficial. Given her past history and symptoms I would like to treat her prophylactically with Levofloxacin for 2 weeks and have her return to care 1 week after antibiotic completion. She will return sooner if she develops any signs of infection such as fever, chills or an increase in drainage. \par \par PLAN:\par - Levofloxacin x 14 days\par - Return to care in 3 weeks\par \par \par \par \par I, Dr. Ackerman, personally performed the evaluation and management (E/M) services for this patient.  That E/M includes conducting the initial examination, assessing all conditions, and establishing the plan of care.  Today, my ACP, Amor Barros NP was here to observe my evaluation and management services for this patient to be followed going forward.\par \par \par

## 2023-05-08 PROBLEM — Z95.2 S/P MITRAL VALVE REPLACEMENT: Status: ACTIVE | Noted: 2022-12-19

## 2023-05-08 PROBLEM — Z95.1 S/P CABG (CORONARY ARTERY BYPASS GRAFT): Status: ACTIVE | Noted: 2022-12-19

## 2023-05-10 ENCOUNTER — APPOINTMENT (OUTPATIENT)
Dept: CARDIOTHORACIC SURGERY | Facility: CLINIC | Age: 83
End: 2023-05-10
Payer: MEDICARE

## 2023-05-10 DIAGNOSIS — Z95.2 PRESENCE OF PROSTHETIC HEART VALVE: ICD-10-CM

## 2023-05-10 DIAGNOSIS — Z95.1 PRESENCE OF AORTOCORONARY BYPASS GRAFT: ICD-10-CM

## 2023-05-10 PROCEDURE — 99214 OFFICE O/P EST MOD 30 MIN: CPT

## 2023-05-19 NOTE — HISTORY OF PRESENT ILLNESS
[FreeTextEntry1] : She is a status post triple vessel coronary artery bypass grafting with the internal mammary artery to the left anterior descending, saphenous vein graft to obtuse marginal one, saphenous vein graft to obtuse marginal two and replacement of the mitral valve with a #27 Epic Abbott tissue valve as well as clipping of the left atrial appendage with a #45 AtriCure AtriClip on 11/27/22. IABP removed 11/28. \par  \par Past Medical history includes of CAD, LBBB, HTN, HLD admitted to [facility] after experiencing acute onset chest pain, found to have NSTEMI, s/p cardiac cath that revealed triple vessel CAD with IABP placed, placed on Heparin gtt and loaded with Plavix. TTE also confirmed Severe MR. Patient transferred to [facility] ICU for surgical evaluation and medical management on 11/23. IABP d/c'd 11/25 with subsequent hypotension, bradycardia, +NSTEMI, with patient urgently brought to cath lab and IABP reinserted in subsequent groin. Pt transferred to CTICU and above surgery was performed.\par \par She presents back to the office today for wound care evaluation. She has some discomfort at the sternal site when lying flat. She is concerned that the infection will return again. She

## 2023-05-19 NOTE — PHYSICAL EXAM
[Sclera] : the sclera and conjunctiva were normal [Neck Appearance] : the appearance of the neck was normal [Respiration, Rhythm And Depth] : normal respiratory rhythm and effort [Heart Rate And Rhythm] : heart rate was normal and rhythm regular [Examination Of The Chest] : the chest was normal in appearance [FreeTextEntry1] : sternal wire  [Abnormal Walk] : normal gait [Skin Color & Pigmentation] : normal skin color and pigmentation [Sensation] : the sensory exam was normal to light touch and pinprick [Oriented To Time, Place, And Person] : oriented to person, place, and time [Impaired Insight] : insight and judgment were intact

## 2023-05-19 NOTE — REVIEW OF SYSTEMS
[Negative] : Heme/Lymph [Feeling Poorly] : not feeling poorly [Feeling Tired] : not feeling tired [Chest Pain] : no chest pain [Palpitations] : no palpitations [Shortness Of Breath] : no shortness of breath [SOB on Exertion] : no shortness of breath during exertion [FreeTextEntry9] : sternal discomfort when lying flat

## 2023-05-19 NOTE — ASSESSMENT
[FreeTextEntry1] : Ms Jang presents to the office for wound care evaluation. She has been having issue with sternal incision drainage and poor healing. She has some pinching and painful sensation at the sternum. She is requesting sternal wire removal for her discomfort and poor healing. I do not feel that this is unreasonable and agree to remove the wires for her pain and risk of infection. \par \par The procedure, hospital stay and recovery was discussed in detail. All risks, benefits, and alternatives discussed at length with patient. All questions addressed. Patient would like to proceed with surgical intervention as discussed. \par \par PLAN:\par - Sternal wire removal\par \par \par \par \par I, Dr. Ackerman, personally performed the evaluation and management (E/M) services for this patient.  That E/M includes conducting the initial examination, assessing all conditions, and establishing the plan of care.  Today, my ACP, Amor Barros NP was here to observe my evaluation and management services for this patient to be followed going forward.\par \par \par

## 2023-05-22 ENCOUNTER — OUTPATIENT (OUTPATIENT)
Dept: OUTPATIENT SERVICES | Facility: HOSPITAL | Age: 83
LOS: 1 days | End: 2023-05-22
Payer: MEDICARE

## 2023-05-22 ENCOUNTER — RESULT REVIEW (OUTPATIENT)
Age: 83
End: 2023-05-22

## 2023-05-22 VITALS
DIASTOLIC BLOOD PRESSURE: 60 MMHG | OXYGEN SATURATION: 98 % | HEIGHT: 63 IN | RESPIRATION RATE: 16 BRPM | SYSTOLIC BLOOD PRESSURE: 120 MMHG | TEMPERATURE: 98 F | WEIGHT: 110.23 LBS | HEART RATE: 88 BPM

## 2023-05-22 DIAGNOSIS — Z95.1 PRESENCE OF AORTOCORONARY BYPASS GRAFT: ICD-10-CM

## 2023-05-22 DIAGNOSIS — I10 ESSENTIAL (PRIMARY) HYPERTENSION: ICD-10-CM

## 2023-05-22 DIAGNOSIS — Z01.818 ENCOUNTER FOR OTHER PREPROCEDURAL EXAMINATION: ICD-10-CM

## 2023-05-22 DIAGNOSIS — Z95.1 PRESENCE OF AORTOCORONARY BYPASS GRAFT: Chronic | ICD-10-CM

## 2023-05-22 DIAGNOSIS — Z95.2 PRESENCE OF PROSTHETIC HEART VALVE: Chronic | ICD-10-CM

## 2023-05-22 DIAGNOSIS — T14.8XXD OTHER INJURY OF UNSPECIFIED BODY REGION, SUBSEQUENT ENCOUNTER: ICD-10-CM

## 2023-05-22 DIAGNOSIS — Z91.89 OTHER SPECIFIED PERSONAL RISK FACTORS, NOT ELSEWHERE CLASSIFIED: ICD-10-CM

## 2023-05-22 DIAGNOSIS — E03.9 HYPOTHYROIDISM, UNSPECIFIED: ICD-10-CM

## 2023-05-22 DIAGNOSIS — Z90.12 ACQUIRED ABSENCE OF LEFT BREAST AND NIPPLE: Chronic | ICD-10-CM

## 2023-05-22 LAB
A1C WITH ESTIMATED AVERAGE GLUCOSE RESULT: 6.3 % — HIGH (ref 4–5.6)
ALBUMIN SERPL ELPH-MCNC: 4.2 G/DL — SIGNIFICANT CHANGE UP (ref 3.3–5.2)
ALP SERPL-CCNC: 88 U/L — SIGNIFICANT CHANGE UP (ref 40–120)
ALT FLD-CCNC: 7 U/L — SIGNIFICANT CHANGE UP
ANION GAP SERPL CALC-SCNC: 14 MMOL/L — SIGNIFICANT CHANGE UP (ref 5–17)
APPEARANCE UR: CLEAR — SIGNIFICANT CHANGE UP
APTT BLD: 28.3 SEC — SIGNIFICANT CHANGE UP (ref 27.5–35.5)
AST SERPL-CCNC: 15 U/L — SIGNIFICANT CHANGE UP
BASOPHILS # BLD AUTO: 0.03 K/UL — SIGNIFICANT CHANGE UP (ref 0–0.2)
BASOPHILS NFR BLD AUTO: 0.5 % — SIGNIFICANT CHANGE UP (ref 0–2)
BILIRUB SERPL-MCNC: 0.2 MG/DL — LOW (ref 0.4–2)
BILIRUB UR-MCNC: NEGATIVE — SIGNIFICANT CHANGE UP
BLD GP AB SCN SERPL QL: SIGNIFICANT CHANGE UP
BUN SERPL-MCNC: 66.4 MG/DL — HIGH (ref 8–20)
CALCIUM SERPL-MCNC: 10.1 MG/DL — SIGNIFICANT CHANGE UP (ref 8.4–10.5)
CHLORIDE SERPL-SCNC: 97 MMOL/L — SIGNIFICANT CHANGE UP (ref 96–108)
CO2 SERPL-SCNC: 26 MMOL/L — SIGNIFICANT CHANGE UP (ref 22–29)
COLOR SPEC: YELLOW — SIGNIFICANT CHANGE UP
CREAT SERPL-MCNC: 1.73 MG/DL — HIGH (ref 0.5–1.3)
DIFF PNL FLD: NEGATIVE — SIGNIFICANT CHANGE UP
EGFR: 29 ML/MIN/1.73M2 — LOW
EOSINOPHIL # BLD AUTO: 0.37 K/UL — SIGNIFICANT CHANGE UP (ref 0–0.5)
EOSINOPHIL NFR BLD AUTO: 6.1 % — HIGH (ref 0–6)
ESTIMATED AVERAGE GLUCOSE: 134 MG/DL — HIGH (ref 68–114)
GLUCOSE SERPL-MCNC: 105 MG/DL — HIGH (ref 70–99)
GLUCOSE UR QL: NEGATIVE MG/DL — SIGNIFICANT CHANGE UP
HCT VFR BLD CALC: 34.7 % — SIGNIFICANT CHANGE UP (ref 34.5–45)
HGB BLD-MCNC: 11.2 G/DL — LOW (ref 11.5–15.5)
IMM GRANULOCYTES NFR BLD AUTO: 0.3 % — SIGNIFICANT CHANGE UP (ref 0–0.9)
INR BLD: 1 RATIO — SIGNIFICANT CHANGE UP (ref 0.88–1.16)
KETONES UR-MCNC: NEGATIVE — SIGNIFICANT CHANGE UP
LEUKOCYTE ESTERASE UR-ACNC: NEGATIVE — SIGNIFICANT CHANGE UP
LYMPHOCYTES # BLD AUTO: 0.98 K/UL — LOW (ref 1–3.3)
LYMPHOCYTES # BLD AUTO: 16 % — SIGNIFICANT CHANGE UP (ref 13–44)
MCHC RBC-ENTMCNC: 29.5 PG — SIGNIFICANT CHANGE UP (ref 27–34)
MCHC RBC-ENTMCNC: 32.3 GM/DL — SIGNIFICANT CHANGE UP (ref 32–36)
MCV RBC AUTO: 91.3 FL — SIGNIFICANT CHANGE UP (ref 80–100)
MONOCYTES # BLD AUTO: 0.48 K/UL — SIGNIFICANT CHANGE UP (ref 0–0.9)
MONOCYTES NFR BLD AUTO: 7.9 % — SIGNIFICANT CHANGE UP (ref 2–14)
NEUTROPHILS # BLD AUTO: 4.23 K/UL — SIGNIFICANT CHANGE UP (ref 1.8–7.4)
NEUTROPHILS NFR BLD AUTO: 69.2 % — SIGNIFICANT CHANGE UP (ref 43–77)
NITRITE UR-MCNC: NEGATIVE — SIGNIFICANT CHANGE UP
NT-PROBNP SERPL-SCNC: 5949 PG/ML — HIGH (ref 0–300)
PH UR: 6 — SIGNIFICANT CHANGE UP (ref 5–8)
PLATELET # BLD AUTO: 191 K/UL — SIGNIFICANT CHANGE UP (ref 150–400)
POTASSIUM SERPL-MCNC: 5.1 MMOL/L — SIGNIFICANT CHANGE UP (ref 3.5–5.3)
POTASSIUM SERPL-SCNC: 5.1 MMOL/L — SIGNIFICANT CHANGE UP (ref 3.5–5.3)
PREALB SERPL-MCNC: 28 MG/DL — SIGNIFICANT CHANGE UP (ref 18–38)
PROT SERPL-MCNC: 8 G/DL — SIGNIFICANT CHANGE UP (ref 6.6–8.7)
PROT UR-MCNC: NEGATIVE — SIGNIFICANT CHANGE UP
PROTHROM AB SERPL-ACNC: 11.6 SEC — SIGNIFICANT CHANGE UP (ref 10.5–13.4)
RBC # BLD: 3.8 M/UL — SIGNIFICANT CHANGE UP (ref 3.8–5.2)
RBC # FLD: 13.9 % — SIGNIFICANT CHANGE UP (ref 10.3–14.5)
SODIUM SERPL-SCNC: 137 MMOL/L — SIGNIFICANT CHANGE UP (ref 135–145)
SP GR SPEC: 1.01 — SIGNIFICANT CHANGE UP (ref 1.01–1.02)
T3 SERPL-MCNC: 68 NG/DL — LOW (ref 80–200)
T4 AB SER-ACNC: 8.1 UG/DL — SIGNIFICANT CHANGE UP (ref 4.5–12)
TSH SERPL-MCNC: 0.32 UIU/ML — SIGNIFICANT CHANGE UP (ref 0.27–4.2)
UROBILINOGEN FLD QL: NEGATIVE MG/DL — SIGNIFICANT CHANGE UP
WBC # BLD: 6.11 K/UL — SIGNIFICANT CHANGE UP (ref 3.8–10.5)
WBC # FLD AUTO: 6.11 K/UL — SIGNIFICANT CHANGE UP (ref 3.8–10.5)

## 2023-05-22 PROCEDURE — 71046 X-RAY EXAM CHEST 2 VIEWS: CPT | Mod: 26

## 2023-05-22 PROCEDURE — 93010 ELECTROCARDIOGRAM REPORT: CPT

## 2023-05-22 NOTE — H&P PST ADULT - NEGATIVE NEUROLOGICAL SYMPTOMS
no weakness/no paresthesias/no generalized seizures/no focal seizures/no syncope/no difficulty walking

## 2023-05-22 NOTE — H&P PST ADULT - TRANSFUSION REACTION, PREVIOUS, PROFILE
Daryl Mack

 Created on:2018



Patient:Daryl Mack

Sex:Male

:1953

External Reference #:[id number]





Demographics







 Address  [address] [place] [address]

 

 Home Phone  [phone]

 

 Mobile Phone  [phone]

 

 Email Address  [email or web address]

 

 Preferred Language  English

 

 Marital Status  Declined to Specify/Unknown

 

 Yazdanism Affiliation  Unknown

 

 Race  White

 

 Ethnic Group  Declined to Specify/Unknown









Author







 Organization  [employer]

 

 Address  [address]



   [place] [address]

 

 Phone  [phone]









Support







 Name  Relationship  Address  Phone

 

 Donna Mack  Wife  [address]  [phone]



     [place] [address]  









Care Team Providers







 Name  Role  Phone

 

 Liana Newman DO  Primary Care Physician  Unavailable









Payers







 Type  Date  Identification Numbers  Payment Provider  Subscriber

 

 Commercial    Policy Number: [id number]  [facility]  Daryl Mack









 PayID: [id number]  [address]









 [place] [address]







Problems







 Date  Description  Provider  Status

 

 Onset: 2018  Localized, primary osteoarthritis of the  Klarissaann marie Barrera M.D.  Active



   pelvic region and thigh    







Family History







 Date  Family Member(s)  Problem(s)  Comments

 

   General  Cancer  

 

   General  Lung Cancer  

 

   Father  Lung Cancer  

 

   Mother  Heart Disease  

 

   First Brother  Lung Cancer  

 

   First Brother  Brain Cancer  







Social History







 Type  Date  Description  Comments

 

 Marital Status      

 

 Lives With    Spouse  

 

 Occupation    Currently Working   at [employer]



       store.

 

 ETOH Use    Occasionally consumes alcohol  

 

 Smoking    Patient has never smoked  

 

 Recreational Drug Use    Denies Drug Use  

 

 Exercise Type/Frequency    Exercises sporadically  







Allergies, Adverse Reactions, Alerts







 Date  Description  Reaction  Status  Severity  Comments

 

 2018  NKDA    active    







Medications







 Medication  Date  Status  Form  Strength  Qnty  SIG  Indications  Ordering



                 Provider

 

 Raised  /  Active  Misc    1units  use after  M16.11  Klarissa



 Toilet Seat  2018          right total    Bruce,



             hip    M.DWil



             arthroplasty    

 

 Rolling  /  Active      1units  use for  M16.11  Klarissa



 Walker  2018          ambulation s/p    kamaljit BarreraDWil

 

 Lasix  /  Active  Tablets  40mg    1.5 po qday    Unknown



   0000              

 

 Centrum  /  Active  Tablets      1 by mouth    Unknown



 Silver  0000          every day    

 

 Olmesartan  /  Active  Tablets  40-25mg    1 by mouth    Unknown



 Medoxomil/Hy  0000          every day    



 drochlorothi                



 azide                

 

 Equate Plus  /  Active  Liquid      as needed    Unknown



   0000              

 

 Tylenol  /  Active            Unknown



   0000              

 

                 

 

 Aleve  /  Hx  Capsules  220mg    1-2 by mouth    Unknown



   0000 -          twice a day as    



   2018              

 

 Benicar HCT  /  Hx  Tablets  40-25mg    1 by mouth    Unknown



   0000 -          every day    



   2018              

 

 Lipo-Flavono  /  Hx  Tablets      1 po tid    Unknown



 id Plus   -              



   2018              

 

 Ocuflox  /  Hx  Solution  0.3%    2 drops each    Unknown



   0000 -          eye every two    



   07/10/          hours while    



   2018          awake x5 days.    

 

 Aleve  /  Hx            Unknown



   0000 -              



   2018              







Vital Signs







 Date  Vital  Result  Comment

 

 2018  Height  71 inches  5'11"









 Weight  318.75 lb  

 

 BP Systolic Sitting  130 mmHg  

 

 BP Diastolic Sitting  86 mmHg  

 

 Respiratory Rate  20 /min  

 

 Body Temperature  98.1 F  

 

 Pain Level  7  

 

 BMI (Body Mass Index)  44.5 kg/m2  









 2018  Height  69.5 inches  5'9.50"









 Weight  319.00 lb  

 

 Heart Rate  80 /min  

 

 BP Systolic  126 mmHg  

 

 BP Diastolic  82 mmHg  

 

 BMI (Body Mass Index)  46.4 kg/m2  









 2018  Height  73 inches  6'1"









 Heart Rate  83 /min  

 

 BP Systolic Sitting  136 mmHg  

 

 BP Diastolic Sitting  70 mmHg  

 

 Respiratory Rate  17 /min  

 

 Pain Level  5  can get very severe

 

 O2 % BldC Oximetry  96 %  ra







Results







 Test  Date  Test  Result  H/L  Range  Note

 

 Inr/Protime  2018  Inr  0.98    0.77-1.02  

 

 Laboratory test finding  2018  Partial Thrombo  30.1 seconds    26.0-
36.3  



     Time PTT        

 

 Comp Metabolic Panel  2018  Sodium  139 mmol/L    135-145  









 Potassium  4.1 mmol/L    3.5-5.0  

 

 Chloride  100 mmol/L  Low  101-111  

 

 Co2 Carbon Dioxide  31 mmol/L    22-32  

 

 Anion Gap  8 mmol/L    2-11  

 

 Glucose  96 mg/dL      

 

 Blood Urea Nitrogen  32 mg/dL  High  6-24  

 

 Creatinine  1.36 mg/dL  High  0.67-1.17  

 

 BUN/Creatinine Ratio  23.5  High  8-20  

 

 Calcium  10.1 mg/dL    8.6-10.3  

 

 Total Protein  7.3 g/dL    6.4-8.9  

 

 Albumin  4.4 g/dL    3.2-5.2  

 

 Globulin  2.9 g/dL    2-4  

 

 Albumin/Globulin Ratio  1.5    1-3  

 

 Total Bilirubin  0.40 mg/dL    0.2-1.0  

 

 Alkaline Phosphatase  57 U/L      

 

 Alt  27 U/L    7-52  

 

 Ast  26 U/L    13-39  

 

 Egfr Non-  52.8    >60  

 

 Egfr   63.8    >60  1









 CBC Auto Diff  2018  White Blood Count  7.7 10^3/uL    3.5-10.8  









 Red Blood Count  4.21 10^6/uL    4.00-5.40  

 

 Hemoglobin  13.8 g/dL  Low  14.0-18.0  

 

 Hematocrit  39 %  Low  42-52  

 

 Mean Corpuscular Volume  93 fL    80-94  

 

 Mean Corpuscular Hemoglobin  33 pg  High  27-31  

 

 Mean Corpuscular HGB Conc  35 g/dL    31-36  

 

 Red Cell Distribution Width  14 %    10.5-15  

 

 Platelet Count  231 10^3/uL    150-450  

 

 Mean Platelet Volume  7.9 um3    7.4-10.4  

 

 Abs Neutrophils  5.2 10^3/uL    1.5-7.7  

 

 Abs Lymphocytes  1.5 10^3/uL    1.0-4.8  

 

 Abs Monocytes  0.8 10^3/uL    0-0.8  

 

 Abs Eosinophils  0.2 10^3/uL    0-0.6  

 

 Abs Basophils  0.1 10^3/uL    0-0.2  

 

 Abs Nucleated RBC  0 10^3/uL      

 

 Granulocyte %  67.0 %    38-83  

 

 Lymphocyte %  19.2 %  Low  25-47  

 

 Monocyte %  9.9 %  High  0-7  

 

 Eosinophil %  2.9 %    0-6  

 

 Basophil %  1.0 %    0-2  

 

 Nucleated Red Blood Cells %  0.1      









 Urinalysis Profile  2018  Urine Color  Straw      









 Urine Appearance  Clear      

 

 Urine Specific Gravity  1.009  Low  1.010-1.030  

 

 Urine pH  6.0    5-9  

 

 Urine Urobilinogen  Negative    Negative  

 

 Urine Ketones  Negative    Negative  

 

 Urine Protein  Negative    Negative  

 

 Urine Leukocytes  Negative    Negative  

 

 Urine Blood  Negative    Negative  

 

 Urine Nitrite  Negative    Negative  

 

 Urine Bilirubin  Negative    Negative  

 

 Urine Glucose  Negative    Negative  









 Type & Screen  2018  Patient Blood Type  O Positive      









 Antibody Screen  NEGATIVE      









 1  *******Because ethnic data is not always readily available,



   this report includes an eGFR for both -Americans and



   non- Americans.****



   The National Kidney Disease Education Program (NKDEP) does



   not endorse the use of the MDRD equation for patients that



   are not between the ages of 18 and 70, are pregnant, have



   extremes of body size, muscle mass, or nutritional status,



   or are non- or non-.



   According to the National Kidney Foundation, irrespective of



   diagnosis, the stage of the disease is based on the level of



   kidney function:



   Stage Description                      GFR(mL/min/1.73 m(2))



   1     Kidney damage with normal or decreased GFR       90



   2     Kidney damage with mild decrease in GFR          60-89



   3     Moderate decrease in GFR                         30-59



   4     Severe decrease in GFR                           15-29



   5     Kidney failure                       <15 (or dialysis)







Procedures







 Date  CPT Code  Description  Status

 

 2018  75576  Radiologic Exam Hips Bilateral With Pelvis Minimum 5  
Completed



     Views  







Encounters







 Type  Date  Location  Provider  CPT E/M  Dx

 

 Office Visit  2018  [facility]  Klarissa Barrera M.D.  [id number]  
M16.11



   10:45a  [facility]      









 M25.551









 Office Visit  2018 11:00a  [facility]  Bruce Martin MD  
[id number]  M16.11



     [facility] [place]      









 Z96.642







Plan of Care

Future Appointment(s):2018  9:30 am - Klarissa Barrera M.D. at [facility]  9:30 am - Blue Nickerson PA-C at [facility]2018  9:30 am - SUZETTE Lubin at [facility]2018  9:30 am - Klarissa Barrera M.D. at [facility] - Klarissa Barrera M.D.M16.11 Unilateral primary 
osteoarthritis, right hipNew Medication:Raised Toilet SeatRolling WalkerFollow 
up:Follow up:   2 weeks after [id number] Pain in right hip no

## 2023-05-22 NOTE — H&P PST ADULT - ASSESSMENT
Patient educated on surgical scrub, preadmission instructions and day of procedure medications, verbalizes understanding. Pt instructed to stop vitamins/supplements/herbal medications/ASA/NSAIDS for one week prior to surgery and discuss with PMD.    CAPRINI SCORE    AGE RELATED RISK FACTORS                                                             [ ] Age 41-60 years                                            (1 Point)  [ ] Age: 61-74 years                                           (2 Points)                 [ ] Age= 75 years                                                (3 Points)             DISEASE RELATED RISK FACTORS                                                       [ ] Edema in the lower extremities                 (1 Point)                     [ ] Varicose veins                                               (1 Point)                                 [ ] BMI > 25 Kg/m2                                            (1 Point)                                  [ ] Serious infection (ie PNA)                            (1 Point)                     [ ] Lung disease ( COPD, Emphysema)            (1 Point)                                                                          [ ] Acute myocardial infarction                         (1 Point)                  [ ] Congestive heart failure (in the previous month)  (1 Point)         [ ] Inflammatory bowel disease                            (1 Point)                  [ ] Central venous access, PICC or Port               (2 points)       (within the last month)                                                                [ ] Stroke (in the previous month)                        (5 Points)    [ ] Previous or present malignancy                       (2 points)                                                                                                                                                         HEMATOLOGY RELATED FACTORS                                                         [ ] Prior episodes of VTE                                     (3 Points)                     [ ] Positive family history for VTE                      (3 Points)                  [ ] Prothrombin 29284 A                                     (3 Points)                     [ ] Factor V Leiden                                                (3 Points)                        [ ] Lupus anticoagulants                                      (3 Points)                                                           [ ] Anticardiolipin antibodies                              (3 Points)                                                       [ ] High homocysteine in the blood                   (3 Points)                                             [ ] Other congenital or acquired thrombophilia      (3 Points)                                                [ ] Heparin induced thrombocytopenia                  (3 Points)                                        MOBILITY RELATED FACTORS  [ ] Bed rest                                                         (1 Point)  [ ] Plaster cast                                                    (2 points)  [ ] Bed bound for more than 72 hours           (2 Points)    GENDER SPECIFIC FACTORS  [ ] Pregnancy or had a baby within the last month   (1 Point)  [ ] Post-partum < 6 weeks                                   (1 Point)  [ ] Hormonal therapy  or oral contraception   (1 Point)  [ ] History of pregnancy complications              (1 point)  [ ] Unexplained or recurrent              (1 Point)    OTHER RISK FACTORS                                           (1 Point)  [ ] BMI >40, smoking, diabetes requiring insulin, chemotherapy  blood transfusions and length of surgery over 2 hours    SURGERY RELATED RISK FACTORS  [ ]  Section within the last month     (1 Point)  [ ] Minor surgery                                                  (1 Point)  [ ] Arthroscopic surgery                                       (2 Points)  [ ] Planned major surgery lasting more            (2 Points)      than 45 minutes     [ ] Elective hip or knee joint replacement       (5 points)       surgery                                                TRAUMA RELATED RISK FACTORS  [ ] Fracture of the hip, pelvis, or leg                       (5 Points)  [ ] Spinal cord injury resulting in paralysis             (5 points)       (in the previous month)    [ ] Paralysis  (less than 1 month)                             (5 Points)  [ ] Multiple Trauma within 1 month                        (5 Points)    Total Score [        ]    Caprini Score 0-2: Low Risk, NO VTE prophylaxis required for most patients, encourage ambulation  Caprini Score 3-6: Moderate Risk , pharmacologic VTE prophylaxis is indicated for most patients (in the absence of contraindications)  Caprini Score Greater than or =7: High risk, pharmocologic VTE prophylaxis indicated for most patients (in the absence of contraindications)    OPIOID RISK TOOL    NICOL EACH BOX THAT APPLIES AND ADD TOTALS AT THE END    FAMILY HISTORY OF SUBSTANCE ABUSE                 FEMALE         MALE                                                Alcohol                             [  ]1 pt          [  ]3pts                                               Illegal Durgs                     [  ]2 pts        [  ]3pts                                               Rx Drugs                           [  ]4 pts        [  ]4 pts    PERSONAL HISTORY OF SUBSTANCE ABUSE                                                                                          Alcohol                             [  ]3 pts       [  ]3 pts                                               Illegal Drugs                     [  ]4 pts        [  ]4 pts                                               Rx Drugs                           [  ]5 pts        [  ]5 pts    AGE BETWEEN 16-45 YEARS                                      [  ]1 pt         [  ]1 pt    HISTORY OF PREADOLESCENT   SEXUAL ABUSE                                                             [  ]3 pts        [  ]0pts    PSYCHOLOGICAL DISEASE                     ADD, OCD, Bipolar, Schizophrenia        [  ]2 pts         [  ]2 pts                      Depression                                               [  ]1 pt           [  ]1 pt           SCORING TOTAL   (add numbers and type here)              (***)                                     A score of 3 or lower indicated LOW risk for future opioid abuse  A score of 4 to 7 indicated moderate risk for future opioid abuse  A score of 8 or higher indicates a high risk for opioid abuse     Patient educated on surgical scrub, preadmission instructions and day of procedure medications, verbalizes understanding. Pt instructed to stop vitamins/supplements/herbal medications/NSAIDS for one week prior to surgery and discuss with PMD. As per Dr. Ackerman office stop aspirin 3 days prior to procedure.      CAPRINI SCORE    AGE RELATED RISK FACTORS                                                             [ ] Age 41-60 years                                            (1 Point)  [ ] Age: 61-74 years                                           (2 Points)                 [ ] Age= 75 years                                                (3 Points)             DISEASE RELATED RISK FACTORS                                                       [ ] Edema in the lower extremities                 (1 Point)                     [ ] Varicose veins                                               (1 Point)                                 [ ] BMI > 25 Kg/m2                                            (1 Point)                                  [ ] Serious infection (ie PNA)                            (1 Point)                     [ ] Lung disease ( COPD, Emphysema)            (1 Point)                                                                          [ ] Acute myocardial infarction                         (1 Point)                  [ ] Congestive heart failure (in the previous month)  (1 Point)         [ ] Inflammatory bowel disease                            (1 Point)                  [ ] Central venous access, PICC or Port               (2 points)       (within the last month)                                                                [ ] Stroke (in the previous month)                        (5 Points)    [ ] Previous or present malignancy                       (2 points)                                                                                                                                                         HEMATOLOGY RELATED FACTORS                                                         [ ] Prior episodes of VTE                                     (3 Points)                     [ ] Positive family history for VTE                      (3 Points)                  [ ] Prothrombin 44560 A                                     (3 Points)                     [ ] Factor V Leiden                                                (3 Points)                        [ ] Lupus anticoagulants                                      (3 Points)                                                           [ ] Anticardiolipin antibodies                              (3 Points)                                                       [ ] High homocysteine in the blood                   (3 Points)                                             [ ] Other congenital or acquired thrombophilia      (3 Points)                                                [ ] Heparin induced thrombocytopenia                  (3 Points)                                        MOBILITY RELATED FACTORS  [ ] Bed rest                                                         (1 Point)  [ ] Plaster cast                                                    (2 points)  [ ] Bed bound for more than 72 hours           (2 Points)    GENDER SPECIFIC FACTORS  [ ] Pregnancy or had a baby within the last month   (1 Point)  [ ] Post-partum < 6 weeks                                   (1 Point)  [ ] Hormonal therapy  or oral contraception   (1 Point)  [ ] History of pregnancy complications              (1 point)  [ ] Unexplained or recurrent              (1 Point)    OTHER RISK FACTORS                                           (1 Point)  [ ] BMI >40, smoking, diabetes requiring insulin, chemotherapy  blood transfusions and length of surgery over 2 hours    SURGERY RELATED RISK FACTORS  [ ]  Section within the last month     (1 Point)  [ ] Minor surgery                                                  (1 Point)  [ ] Arthroscopic surgery                                       (2 Points)  [ ] Planned major surgery lasting more            (2 Points)      than 45 minutes     [ ] Elective hip or knee joint replacement       (5 points)       surgery                                                TRAUMA RELATED RISK FACTORS  [ ] Fracture of the hip, pelvis, or leg                       (5 Points)  [ ] Spinal cord injury resulting in paralysis             (5 points)       (in the previous month)    [ ] Paralysis  (less than 1 month)                             (5 Points)  [ ] Multiple Trauma within 1 month                        (5 Points)    Total Score [        ]    Caprini Score 0-2: Low Risk, NO VTE prophylaxis required for most patients, encourage ambulation  Caprini Score 3-6: Moderate Risk , pharmacologic VTE prophylaxis is indicated for most patients (in the absence of contraindications)  Caprini Score Greater than or =7: High risk, pharmocologic VTE prophylaxis indicated for most patients (in the absence of contraindications)    OPIOID RISK TOOL    NICOL EACH BOX THAT APPLIES AND ADD TOTALS AT THE END    FAMILY HISTORY OF SUBSTANCE ABUSE                 FEMALE         MALE                                                Alcohol                             [  ]1 pt          [  ]3pts                                               Illegal Durgs                     [  ]2 pts        [  ]3pts                                               Rx Drugs                           [  ]4 pts        [  ]4 pts    PERSONAL HISTORY OF SUBSTANCE ABUSE                                                                                          Alcohol                             [  ]3 pts       [  ]3 pts                                               Illegal Drugs                     [  ]4 pts        [  ]4 pts                                               Rx Drugs                           [  ]5 pts        [  ]5 pts    AGE BETWEEN 16-45 YEARS                                      [  ]1 pt         [  ]1 pt    HISTORY OF PREADOLESCENT   SEXUAL ABUSE                                                             [  ]3 pts        [  ]0pts    PSYCHOLOGICAL DISEASE                     ADD, OCD, Bipolar, Schizophrenia        [  ]2 pts         [  ]2 pts                      Depression                                               [  ]1 pt           [  ]1 pt           SCORING TOTAL   (add numbers and type here)              (***)                                     A score of 3 or lower indicated LOW risk for future opioid abuse  A score of 4 to 7 indicated moderate risk for future opioid abuse  A score of 8 or higher indicates a high risk for opioid abuse   84 y/o female with PMH of Aortic stenosis, LBBB, HTN, HLD, breast CA and CAD presents to PST with complaints of recurrent chest infections since her cardiac surgery in 2022. She is s/p triple vessel coronary artery bypass grafting with the internal mammary artery to the left anterior descending, saphenous vein graft to obtuse marginal one, saphenous vein graft to obtuse marginal two and replacement of the mitral valve with a #27 Epic Abbott tissue valve as well as clipping of the left atrial appendage with a #45 AtriCure on 22, IABP removed 22. She reports she has had 2 separate infections in the chest where the wires are located from the CABG. She was treated both times with oral antibiotics. She states she was experiencing intermittent chest pain which had mostly resolved, unless she is laying flat, when laying flat she occasionally feels chest discomfort. Denies shortness of breath, difficulty breathing, fevers, chills, nausea or vomiting. Patient is scheduled for sternal wire removal on 23 with Dr. Ackerman. Patient educated on surgical scrub, preadmission instructions and day of procedure medications, verbalizes understanding. Pt instructed to stop vitamins/supplements/herbal medications/NSAIDS for one week prior to surgery and discuss with PMD. As per Dr. Ackerman office stop aspirin 3 days prior to procedure.      Patient stated she took aspirin in the AM of 23, called the office of Dr. Ackerman, spoke with DANIEL DAVID, Made aware patient took aspirin and Eliquis today, 23. AS per Blake BEASLEY hold aspirin and eliquis starting 23. Made patient aware, she agrees and verbalized understanding.     CAPRINI SCORE    AGE RELATED RISK FACTORS                                                             [ ] Age 41-60 years                                            (1 Point)  [ ] Age: 61-74 years                                           (2 Points)                 [ x] Age= 75 years                                                (3 Points)             DISEASE RELATED RISK FACTORS                                                       [ ] Edema in the lower extremities                 (1 Point)                     [ ] Varicose veins                                               (1 Point)                                 [ ] BMI > 25 Kg/m2                                            (1 Point)                                  [ ] Serious infection (ie PNA)                            (1 Point)                     [ ] Lung disease ( COPD, Emphysema)            (1 Point)                                                                          [ ] Acute myocardial infarction                         (1 Point)                  [ ] Congestive heart failure (in the previous month)  (1 Point)         [ ] Inflammatory bowel disease                            (1 Point)                  [ ] Central venous access, PICC or Port               (2 points)       (within the last month)                                                                [ ] Stroke (in the previous month)                        (5 Points)    [x ] Previous or present malignancy                       (2 points)                                                                                                                                                         HEMATOLOGY RELATED FACTORS                                                         [ ] Prior episodes of VTE                                     (3 Points)                     [ ] Positive family history for VTE                      (3 Points)                  [ ] Prothrombin 08561 A                                     (3 Points)                     [ ] Factor V Leiden                                                (3 Points)                        [ ] Lupus anticoagulants                                      (3 Points)                                                           [ ] Anticardiolipin antibodies                              (3 Points)                                                       [ ] High homocysteine in the blood                   (3 Points)                                             [ ] Other congenital or acquired thrombophilia      (3 Points)                                                [ ] Heparin induced thrombocytopenia                  (3 Points)                                        MOBILITY RELATED FACTORS  [ ] Bed rest                                                         (1 Point)  [ ] Plaster cast                                                    (2 points)  [ ] Bed bound for more than 72 hours           (2 Points)    GENDER SPECIFIC FACTORS  [ ] Pregnancy or had a baby within the last month   (1 Point)  [ ] Post-partum < 6 weeks                                   (1 Point)  [ ] Hormonal therapy  or oral contraception   (1 Point)  [ ] History of pregnancy complications              (1 point)  [ ] Unexplained or recurrent              (1 Point)    OTHER RISK FACTORS                                           (1 Point)  [x ] BMI >40, smoking, diabetes requiring insulin, chemotherapy  blood transfusions and length of surgery over 2 hours    SURGERY RELATED RISK FACTORS  [ ]  Section within the last month     (1 Point)  [ ] Minor surgery                                                  (1 Point)  [ ] Arthroscopic surgery                                       (2 Points)  [x ] Planned major surgery lasting more            (2 Points)      than 45 minutes     [ ] Elective hip or knee joint replacement       (5 points)       surgery                                                TRAUMA RELATED RISK FACTORS  [ ] Fracture of the hip, pelvis, or leg                       (5 Points)  [ ] Spinal cord injury resulting in paralysis             (5 points)       (in the previous month)    [ ] Paralysis  (less than 1 month)                             (5 Points)  [ ] Multiple Trauma within 1 month                        (5 Points)    Total Score [      8  ]    Caprini Score 0-2: Low Risk, NO VTE prophylaxis required for most patients, encourage ambulation  Caprini Score 3-6: Moderate Risk , pharmacologic VTE prophylaxis is indicated for most patients (in the absence of contraindications)  Caprini Score Greater than or =7: High risk, pharmocologic VTE prophylaxis indicated for most patients (in the absence of contraindications)    OPIOID RISK TOOL    NICOL EACH BOX THAT APPLIES AND ADD TOTALS AT THE END    FAMILY HISTORY OF SUBSTANCE ABUSE                 FEMALE         MALE                                                Alcohol                             [  ]1 pt          [  ]3pts                                               Illegal Durgs                     [  ]2 pts        [  ]3pts                                               Rx Drugs                           [  ]4 pts        [  ]4 pts    PERSONAL HISTORY OF SUBSTANCE ABUSE                                                                                          Alcohol                             [  ]3 pts       [  ]3 pts                                               Illegal Drugs                     [  ]4 pts        [  ]4 pts                                               Rx Drugs                           [  ]5 pts        [  ]5 pts    AGE BETWEEN 16-45 YEARS                                      [  ]1 pt         [  ]1 pt    HISTORY OF PREADOLESCENT   SEXUAL ABUSE                                                             [  ]3 pts        [  ]0pts    PSYCHOLOGICAL DISEASE                     ADD, OCD, Bipolar, Schizophrenia        [  ]2 pts         [  ]2 pts                      Depression                                               [  x]1 pt           [  ]1 pt           SCORING TOTAL   (add numbers and type here)              (**1*)                                     A score of 3 or lower indicated LOW risk for future opioid abuse  A score of 4 to 7 indicated moderate risk for future opioid abuse  A score of 8 or higher indicates a high risk for opioid abuse

## 2023-05-22 NOTE — H&P PST ADULT - HISTORY OF PRESENT ILLNESS
84 y/o female with PMH of Aortic stenosis, LBBB, HTN, HLD, breast CA and CAD  presents to PST with complaints of     She is s/p triple vessel coronary artery bypass grafting with the internal mammary artery to the left anterior descending, saphenous vein graft to obtuse marginal one, saphenous vein graft to obtuse marginal two and replacement of the mitral valve with a #27 Epic Abbott tissue valve as well as clipping of the left atrial appendage with a #45 AtriCure on 11/27/22, IABP removed 11/28.      Past Medical history includes of CAD, LBBB, HTN, HLD admitted to [facility] after experiencing acute onset chest pain, found to have NSTEMI, s/p cardiac cath that revealed triple vessel CAD with IABP placed, placed on Heparin gtt and loaded with Plavix. TTE also confirmed Severe MR. Patient transferred to [facility] ICU for surgical evaluation and medical management on 11/23. IABP d/c'd 11/25 with subsequent hypotension, bradycardia, +NSTEMI, with patient urgently brought to cath lab and IABP reinserted in subsequent groin. Pt transferred to CTICU and above surgery was performed.    She presents back to the office today for wound care evaluation. She has some discomfort at the sternal site when lying flat. She is concerned that the infection will return again. 82 y/o female with PMH of Aortic stenosis, LBBB, HTN, HLD, breast CA and CAD presents to PST with complaints of recurrent chest infections. She is s/p triple vessel coronary artery bypass grafting with the internal mammary artery to the left anterior descending, saphenous vein graft to obtuse marginal one, saphenous vein graft to obtuse marginal two and replacement of the mitral valve with a #27 Epic Abbott tissue valve as well as clipping of the left atrial appendage with a #45 AtriCure on 11/27/22, IABP removed 11/28. She reports she has had 2 separate infections in the chest where the sires are located from the CABG. She was treated both times with oral antibiotics. She states she was experiencing intermittent chest pain which had mostly resolved, unless she is laying flat, when laying flat she occasionally feels chest discomfort. Denies shortness of breath, difficulty breathing, fevers, chills, nausea or vomiting. Patient is scheduled for sternal wire removal on 5/25/23 with Dr. Ackerman.  84 y/o female with PMH of Aortic stenosis, LBBB, HTN, HLD, breast CA and CAD presents to PST with complaints of recurrent chest infections since her cardiac surgery in 11/2022. She is s/p triple vessel coronary artery bypass grafting with the internal mammary artery to the left anterior descending, saphenous vein graft to obtuse marginal one, saphenous vein graft to obtuse marginal two and replacement of the mitral valve with a #27 Epic Abbott tissue valve as well as clipping of the left atrial appendage with a #45 AtriCure on 11/27/22, IABP removed 11/28/22. She reports she has had 2 separate infections in the chest where the wires are located from the CABG. She was treated both times with oral antibiotics. She states she was experiencing intermittent chest pain which had mostly resolved, unless she is laying flat, when laying flat she occasionally feels chest discomfort. Denies shortness of breath, difficulty breathing, fevers, chills, nausea or vomiting. Patient is scheduled for sternal wire removal on 5/25/23 with Dr. Ackerman.

## 2023-05-22 NOTE — H&P PST ADULT - NSICDXPASTMEDICALHX_GEN_ALL_CORE_FT
PAST MEDICAL HISTORY:  Breast cancer     History of mitral valve prolapse     Hyperlipidemia     Hypertension     Hypothyroid      PAST MEDICAL HISTORY:  Breast cancer     CAD (coronary artery disease)     H/O hiatal hernia     History of mitral valve prolapse     Hyperlipidemia     Hypertension     Hypothyroid

## 2023-05-23 LAB
MRSA PCR RESULT.: SIGNIFICANT CHANGE UP
S AUREUS DNA NOSE QL NAA+PROBE: SIGNIFICANT CHANGE UP

## 2023-05-24 ENCOUNTER — TRANSCRIPTION ENCOUNTER (OUTPATIENT)
Age: 83
End: 2023-05-24

## 2023-05-24 LAB
CULTURE RESULTS: SIGNIFICANT CHANGE UP
SPECIMEN SOURCE: SIGNIFICANT CHANGE UP

## 2023-05-25 ENCOUNTER — RESULT REVIEW (OUTPATIENT)
Age: 83
End: 2023-05-25

## 2023-05-25 ENCOUNTER — OUTPATIENT (OUTPATIENT)
Dept: OUTPATIENT SERVICES | Facility: HOSPITAL | Age: 83
LOS: 1 days | End: 2023-05-25
Payer: MEDICARE

## 2023-05-25 ENCOUNTER — APPOINTMENT (OUTPATIENT)
Dept: CARDIOTHORACIC SURGERY | Facility: HOSPITAL | Age: 83
End: 2023-05-25

## 2023-05-25 ENCOUNTER — TRANSCRIPTION ENCOUNTER (OUTPATIENT)
Age: 83
End: 2023-05-25

## 2023-05-25 DIAGNOSIS — Z95.1 PRESENCE OF AORTOCORONARY BYPASS GRAFT: Chronic | ICD-10-CM

## 2023-05-25 DIAGNOSIS — Z95.2 PRESENCE OF PROSTHETIC HEART VALVE: Chronic | ICD-10-CM

## 2023-05-25 DIAGNOSIS — Z90.12 ACQUIRED ABSENCE OF LEFT BREAST AND NIPPLE: Chronic | ICD-10-CM

## 2023-05-25 PROBLEM — Z87.19 PERSONAL HISTORY OF OTHER DISEASES OF THE DIGESTIVE SYSTEM: Chronic | Status: ACTIVE | Noted: 2023-05-22

## 2023-05-25 PROCEDURE — G0463: CPT

## 2023-05-25 PROCEDURE — 86850 RBC ANTIBODY SCREEN: CPT

## 2023-05-25 PROCEDURE — 88304 TISSUE EXAM BY PATHOLOGIST: CPT

## 2023-05-25 PROCEDURE — 20680 REMOVAL OF IMPLANT DEEP: CPT | Mod: AS

## 2023-05-25 PROCEDURE — 93005 ELECTROCARDIOGRAM TRACING: CPT

## 2023-05-25 PROCEDURE — 86900 BLOOD TYPING SEROLOGIC ABO: CPT

## 2023-05-25 PROCEDURE — 20680 REMOVAL OF IMPLANT DEEP: CPT

## 2023-05-25 PROCEDURE — 86901 BLOOD TYPING SEROLOGIC RH(D): CPT

## 2023-05-25 PROCEDURE — 85730 THROMBOPLASTIN TIME PARTIAL: CPT

## 2023-05-25 PROCEDURE — 71046 X-RAY EXAM CHEST 2 VIEWS: CPT

## 2023-05-25 PROCEDURE — 85610 PROTHROMBIN TIME: CPT

## 2023-05-25 PROCEDURE — 36415 COLL VENOUS BLD VENIPUNCTURE: CPT

## 2023-05-25 RX ORDER — SERTRALINE 25 MG/1
1 TABLET, FILM COATED ORAL
Qty: 0 | Refills: 0 | DISCHARGE

## 2023-05-25 RX ORDER — SACUBITRIL AND VALSARTAN 24; 26 MG/1; MG/1
1 TABLET, FILM COATED ORAL
Refills: 0 | DISCHARGE

## 2023-05-25 RX ORDER — PANTOPRAZOLE SODIUM 20 MG/1
1 TABLET, DELAYED RELEASE ORAL
Qty: 0 | Refills: 0 | DISCHARGE

## 2023-05-25 RX ORDER — METOPROLOL TARTRATE 50 MG
1 TABLET ORAL
Refills: 0 | DISCHARGE

## 2023-05-25 RX ORDER — LEVOTHYROXINE SODIUM 125 MCG
1 TABLET ORAL
Qty: 0 | Refills: 0 | DISCHARGE

## 2023-05-25 RX ORDER — ATORVASTATIN CALCIUM 80 MG/1
1 TABLET, FILM COATED ORAL
Qty: 0 | Refills: 0 | DISCHARGE

## 2023-05-30 DIAGNOSIS — T14.8XXD OTHER INJURY OF UNSPECIFIED BODY REGION, SUBSEQUENT ENCOUNTER: ICD-10-CM

## 2023-05-31 ENCOUNTER — APPOINTMENT (OUTPATIENT)
Dept: CARDIOTHORACIC SURGERY | Facility: CLINIC | Age: 83
End: 2023-05-31
Payer: MEDICARE

## 2023-05-31 DIAGNOSIS — T14.8XXD OTHER INJURY OF UNSPECIFIED BODY REGION, SUBSEQUENT ENCOUNTER: ICD-10-CM

## 2023-05-31 PROBLEM — I25.10 ATHEROSCLEROTIC HEART DISEASE OF NATIVE CORONARY ARTERY WITHOUT ANGINA PECTORIS: Chronic | Status: ACTIVE | Noted: 2023-05-22

## 2023-05-31 PROCEDURE — 99024 POSTOP FOLLOW-UP VISIT: CPT

## 2023-05-31 NOTE — PHYSICAL EXAM
[Respiration, Rhythm And Depth] : normal respiratory rhythm and effort [Heart Rate And Rhythm] : heart rate was normal and rhythm regular [Dry] : dry [Clean] : clean [Healing Well] : healing well

## 2023-06-05 NOTE — REASON FOR VISIT
[de-identified] : sternal wire removal [de-identified] : 5/25/23 [de-identified] : She is a status post triple vessel coronary artery bypass grafting with the internal mammary artery to the left anterior descending, saphenous vein graft to obtuse marginal one, saphenous vein graft to obtuse marginal two and replacement of the mitral valve with a #27 Epic Abbott tissue valve as well as clipping of the left atrial appendage with a #45 AtriCure AtriClip on 11/27/22. IABP removed 11/28.\par \par For the past several months, Mr. Jang has been experiencing sternal incision drainage and poor healing. She has also been experiencing pinching and painful sensations at the sternum.\par

## 2023-06-05 NOTE — ASSESSMENT
[FreeTextEntry1] : Ms Jang presents to the office today for sternal wire removal. She is overall well and is not experiencing any pain. Staples and suture were removed at today's visit and the skin was well approximated. No infectious processes were observed. She will continue care with Cardiology and return as needed. \par \par PLAN:\par - Return to care in the office as needed\par \par \par \par \par \par \par I, Dr. Ackerman, personally performed the evaluation and management (E/M) services for this patient.  That E/M includes conducting the initial examination, assessing all conditions, and establishing the plan of care.  Today, my ACP, Amor Barros NP was here to observe my evaluation and management services for this patient to be followed going forward.\par \par \par

## 2024-02-05 NOTE — PROGRESS NOTE ADULT - PROBLEM SELECTOR PROBLEM 6
Preventive Care Visit  [facility] KIMBERLY Judd PA-C, Family Medicine  Feb 5, 2024  {Provider  Link to [facility] :[id number]}    SUBJECTIVE:   Betsey is a 61 year old, presenting for the following:  Physical        2/5/2024     7:27 AM   Additional Questions   Roomed by EDGAR Brody         2/5/2024     7:27 AM   Patient Reported Additional Medications   Patient reports taking the following new medications None per patient     Are you in the first 12 months of your Medicare coverage?  No    History of Present Illness       Diabetes:   She presents for follow up of diabetes.  She is checking home blood glucose a few times a month.   She checks blood glucose before and after meals.  Blood glucose is never over 200 and sometimes under 70. She is aware of hypoglycemia symptoms including shakiness, dizziness, weakness and confusion.    She has no concerns regarding her diabetes at this time.  She is having weight gain.            She eats 0-1 servings of fruits and vegetables daily.She consumes 0 sweetened beverage(s) daily.She exercises with enough effort to increase her heart rate 30 to 60 minutes per day.    She is taking medications regularly.    Today's PHQ-9 Score:       2/5/2024     7:23 AM   PHQ-9 SCORE   PHQ-9 Total Score MyChart 1 (Minimal depression)   PHQ-9 Total Score 1         Have you ever done Advance Care Planning? (For example, a Health Directive, POLST, or a discussion with a medical provider or your loved ones about your wishes): No, advance care planning information given to patient to review.  Patient plans to discuss their wishes with loved ones or provider.         Fall risk  Fallen 2 or more times in the past year?: Yes    Cognitive Screening   1) Repeat 3 items (Leader, Season, Table)    2) Clock draw: NORMAL  3) 3 item recall: Recalls 2 objects   Results: ABNORMAL clock, 1-2 items recalled: PROBABLE COGNITIVE IMPAIRMENT, **INFORM PROVIDER**    Mini-CogTM Copyright S Poncho.  Licensed by the author for use in [facility]; reprinted with permission ([email or web address]). All rights reserved.      {Do you have sleep apnea, excessive snoring or daytime drowsiness? (Optional):383165}    Reviewed and updated as needed this visit by clinical staff   Tobacco  Allergies               Reviewed and updated as needed this visit by Provider                  Social History     Tobacco Use    Smoking status: Former     Types: Cigarettes     Start date: 1978     Quit date: 1998     Years since quittin.0    Smokeless tobacco: Never    Tobacco comments:     1 pack a day    Substance Use Topics    Alcohol use: Yes     Comment: very rare     {Rooming staff  Click this link to complete the Prescreen if response below is not for today's visit  Alcohol Use Prescreen >3 drinks/day or > 7 drinks/week.  If the prescreen question answer is YES, complete the full AUDIT  :684966}        2024     7:39 AM   Alcohol Use   Prescreen: >3 drinks/day or >7 drinks/week? No     Do you have a current opioid prescription? (!) YES   How severe is your pain on a scale from 1-10? {PAIN SCALE :781556}     Do you use any other controlled substances or medications that are not prescribed by a provider? None      Current providers sharing in care for this patient include:   Patient Care Team:  Katerina Judd PA-C as PCP - General (Family Medicine)  Noah Martins Tina C, MD as Assigned Surgical Provider  Laura Stallings MD as MD (Otolaryngology)  Katerina Judd PA-C as Assigned PCP  Terrence Carrillo MD as Assigned Rheumatology Provider  Zia Chew MD as MD (Neurology)  DEBRA To MD as MD (Cardiovascular Disease)  Aaron Cassidy MD as MD (Neurology)  Ronda Vasquez APRN CNP as Nurse Practitioner (Pain Medicine)  Ronda Vasquez APRN CNP as Assigned Pain Medication Provider  No Ref-Primary, Physician    The following health maintenance items are reviewed in  Epic and correct as of today:  Health Maintenance   Topic Date Due    RSV VACCINE (Pregnancy & 60+) (1 - 1-dose 60+ series) Never done    ASTHMA ACTION PLAN  02/18/2023    COLORECTAL CANCER SCREENING  11/14/2023    BMP  01/24/2024    MICROALBUMIN  01/24/2024    DIABETIC FOOT EXAM  01/24/2024    A1C  01/28/2024    MEDICARE ANNUAL WELLNESS VISIT  01/24/2024    ASTHMA CONTROL TEST  02/29/2024    LIPID  07/28/2024    ANNUAL REVIEW OF HM ORDERS  07/28/2024    URINE DRUG SCREEN  08/21/2024    CONTROLLED SUBSTANCE AGREEMENT FOR CHRONIC PAIN MANAGEMENT  08/21/2024    EYE EXAM  11/08/2024    BONNIE ASSESSMENT  02/05/2025    PHQ-9  02/05/2025    MAMMO SCREENING  09/12/2025    Pneumococcal Vaccine: Pediatrics (0 to 5 Years) and At-Risk Patients (6 to 64 Years) (3 of 3 - PPSV23 or PCV20) 05/11/2027    ADVANCE CARE PLANNING  01/24/2028    DTAP/TDAP/TD IMMUNIZATION (3 - Td or Tdap) 04/17/2029    HEPATITIS C SCREENING  Completed    HIV SCREENING  Completed    DEPRESSION ACTION PLAN  Completed    INFLUENZA VACCINE  Completed    COVID-19 Vaccine  Completed    IPV IMMUNIZATION  Aged Out    HPV IMMUNIZATION  Aged Out    MENINGITIS IMMUNIZATION  Aged Out    RSV MONOCLONAL ANTIBODY  Aged Out    PAP  Discontinued    ZOSTER IMMUNIZATION  Discontinued    HEPATITIS A IMMUNIZATION  Discontinued     {Chronicprobdata (optional):428689}  {Decision Support (Optional):243675}    FHS-7:       7/28/2021     7:29 AM 1/24/2023     5:45 AM 2/1/2023     6:59 AM 9/12/2023     2:08 PM   Breast CA Risk Assessment (FHS-7)   Did any of your first-degree relatives have breast or ovarian cancer? No Yes Yes Yes   Did any of your relatives have bilateral breast cancer? Yes Yes Yes No   Did any man in your family have breast cancer? No No No No   Did any woman in your family have breast and ovarian cancer? No No No No   Did any woman in your family have breast cancer before age 50 y? No No No No   Do you have 2 or more relatives with breast and/or ovarian cancer?  "No Yes Yes Yes   Do you have 2 or more relatives with breast and/or bowel cancer? Yes No Yes Yes     {If any of the questions to the BCRA (FHS-7) are answered yes, consider ordering referral for genetic counseling (Optional) :050249}  {AMB Mammogram Decision Support (Optional) :494350}  Pertinent mammograms are reviewed under the imaging tab.  Review of Systems   {ROS Picklists (Optional):475804}    OBJECTIVE:   /58   Pulse 82   Temp 97.5  F (36.4  C) (Temporal)   Resp 16   Ht 1.575 m (5' 2\")   Wt 75 kg (165 lb 6.4 oz)   LMP  (LMP Unknown)   SpO2 97%   BMI 30.25 kg/m     Estimated body mass index is 30.25 kg/m  as calculated from the following:    Height as of this encounter: 1.575 m (5' 2\").    Weight as of this encounter: 75 kg (165 lb 6.4 oz).  Physical Exam  {Exam List (Optional):828042}    {Diagnostic Test Results (Optional):719767}    ASSESSMENT / PLAN:   {Diag Picklist:878619}    {Patient advised of split billing (Optional):857389}      Counseling  {Medicare Counselin}      BMI  Estimated body mass index is 30.25 kg/m  as calculated from the following:    Height as of this encounter: 1.575 m (5' 2\").    Weight as of this encounter: 75 kg (165 lb 6.4 oz).   {Weight Management Plan needed for ACO:762528}      She reports that she quit smoking about 26 years ago. Her smoking use included cigarettes. She started smoking about 46 years ago. She has never used smokeless tobacco.      Appropriate preventive services were discussed with this patient, including applicable screening as appropriate for fall prevention, nutrition, physical activity, Tobacco-use cessation, weight loss and cognition.  Checklist reviewing preventive services available has been given to the patient.    Reviewed patients plan of care and provided an AVS. The {CarePlan:713079} for Betsey meets the Care Plan requirement. This Care Plan has been established and reviewed with the {PATIENT, FAMILY MEMBER, " CAREGIVER:975785}.    {Counseling Resources  [place] Preventive Services Task Force  Cholesterol Screening  Health diet/nutrition  Pooled Cohorts Equation Calculator  [employer] MyPlate  ASA Prophylaxis  Lung CA Screening  Osteoporosis prevention/bone health :675480}  {Breast Cancer Risk Calculator  BRCA-Related Cancer Risk Assessment FHS-7 Tool :204662}    Signed Electronically by: Katerina Judd PA-C    Identified Health Risks  {Medicare required documentation of substance and opioid use disorders screening :775880}   Acute on chronic systolic congestive heart failure

## 2024-06-01 NOTE — PROGRESS NOTE ADULT - SUBJECTIVE AND OBJECTIVE BOX
PRAVEEN GONZALEZ  MRN-[id number]    HPI:  83 yo female, PMHx of LBBB, HTN, HLD, mitral valve prolapse, hypothyroidism, breast CA 10 years ago s/p chemotherapy and left mastectomy, admitted to [facility] 11/22 after experiencing acute onset chest pain with radiation to back that onset the night prior. Ruled in for NSTEMI with positive troponin 430 --> 446. She underwent diagnostic cardiac catheterization with Dr. Garibay, which revealed triple vessel CAD, with severe LAD, LCx, and occlusion of RCA. Ventriculogram with EF 30% with concerns for severe MR. She was given ASA, Plavix load 600mg, and placed on Heparin gtt for ACS. An IABP and SGC were placed and she was transferred to [facility] MICU for further evaluation and medical management. Upon arrival to MICU, hemodynamics stable, ADBP >170 on 1:1. Patient was on 100% NRB weaned to 3lpm nasal cannula laying supine comfortably. Denies chest pain, dyspnea, palpitations, orthopnea, LE edema.  (23 Nov 2022 21:40)      Surgery/Hospital Course:  Surgery/Hospital Course:  ·  PRE-OP DIAGNOSIS:  CAD (coronary artery disease)   Severe mitral insufficiency   Non-ST elevation MI (NSTEMI)   ·  POST-OP DIAGNOSIS:  CAD (coronary artery disease)  Severe mitral insufficiency  NSTEMI (non-ST elevation myocardial infarction)  ·  PROCEDURES:  CABG, with saphenous vein graft 27-Nov-2022   C3L (LIMA-LAD, SVG-OM1, SVG-OM2)   Clipping, left atrial appendage ,  45 V Clip   Mitral valve replacement , Epic Plus Valve   Today:  No acute events  DC  Primacor-  CVP  11  DC cordis       ICU Vital Signs Last 24 Hrs  T(C): 36.6 (30 Nov 2022 12:53), Max: 37.6 (29 Nov 2022 16:01)  T(F): 97.9 (30 Nov 2022 12:53), Max: 99.7 (29 Nov 2022 16:01)  HR: 86 (30 Nov 2022 13:00) (86 - 98)  BP: 127/58 (30 Nov 2022 09:00) (107/53 - 157/70)  BP(mean): 83 (30 Nov 2022 09:00) (76 - 100)  ABP: 116/56 (30 Nov 2022 13:00) (101/37 - 176/67)  ABP(mean): 75 (30 Nov 2022 13:00) (54 - 96)  RR: 22 (30 Nov 2022 13:00) (11 - 27)  SpO2: 98% (30 Nov 2022 13:00) (94% - 100%)    O2 Parameters below as of 30 Nov 2022 12:00  Patient On (Oxygen Delivery Method): nasal cannula  O2 Flow (L/min): 4          Physical Exam:  Gen: A&O   CNS: non focal 	  Neck: no JVD  RES : clear , no wheezing              CVS: Regular  rhythm. Normal S1/S2  Abd: Soft, non-distended. Bowel sounds present.  Skin: No rash.  Ext:  no edema    ============================I/O===========================   I&O's Detail    29 Nov 2022 07:01  -  30 Nov 2022 07:00  --------------------------------------------------------  IN:    Albumin 5%  - 250 mL: 250 mL    IV PiggyBack: 50 mL    IV PiggyBack: 100 mL    IV PiggyBack: 100 mL    Lactated Ringers Bolus: 500 mL    Milrinone: 8 mL    Milrinone: 4 mL    Milrinone: 97.4 mL    Milrinone: 19.5 mL    NiCARdipine: 20 mL    Norepinephrine: 20.9 mL    Norepinephrine: 5 mL    Oral Fluid: 280 mL    sodium chloride 0.9%: 120 mL    sodium chloride 0.9%: 240 mL  Total IN: 1814.8 mL    OUT:    Chest Tube (mL): 70 mL    Chest Tube (mL): 160 mL    Indwelling Catheter - Urethral (mL): 1145 mL  Total OUT: 1375 mL    Total NET: 439.8 mL      30 Nov 2022 07:01  -  30 Nov 2022 13:17  --------------------------------------------------------  IN:  Total IN: 0 mL    OUT:    Chest Tube (mL): 25 mL    Chest Tube (mL): 20 mL    Indwelling Catheter - Urethral (mL): 225 mL  Total OUT: 270 mL    Total NET: -270 mL        ============================ LABS =========================                        8.5    18.78 )-----------( 99       ( 30 Nov 2022 02:00 )             25.2     11-30    138  |  104  |  38.0<H>  ----------------------------<  139<H>  4.3   |  22.0  |  1.58<H>    Ca    8.3<L>      30 Nov 2022 02:00  Phos  3.1     11-30  Mg     2.9     11-30        PT/INR - ( 30 Nov 2022 02:00 )   PT: 13.1 sec;   INR: 1.13 ratio         PTT - ( 30 Nov 2022 02:00 )  PTT:25.1 sec      ======================Micro/Rad/Cardio=================  Culture: Reviewed   CXR: Reviewed  Echo:Reviewed  ======================================================  PAST MEDICAL & SURGICAL HISTORY:  Hypertension      Hyperlipidemia      Hypothyroid      Breast cancer      History of mitral valve prolapse      S/P mastectomy, left        ====================ASSESSMENT ==============  81yo F w/ PMHx of CAD, LBBB, HTN, HLD admitted to [facility] after experiencing acute onset chest pain, found to have NSTEMI and underwent cardiac cath that revealed triple vessel CAD, placed on Heparin gtt and loaded with Plavix.  TTE also confirmed severe MR. Patient transferred to [facility] ICU for surgical evaluation and medical management on 11/23. Had intraoperative IABP that was d/c'd 11/25 pt subsequently became bradycardic and hypotensive.  Urgently brought to cath labs and IABP reinserted in subsequent groin.  Pt transferred to CTICU Dr Ackerman service.  +NSTEMI.  since s.p C3L MVR(t) DEVONTE Clip came ou on primacor, levophed - briefly on dobutamine since d.c for tachycardia - currently remains on priamcor for cardiogenic shock +/- levophed for hypotension   post op also with acute blood loss anemia requiring blood, and acute hypoxic respiratory failure with high oxygen requirement on vent - since weaned off.  condition guarded     --- Triple vessel coronary artery disease.   ---s.p CABG   --- Severe mitral regurgitation by prior echocardiogram.   ---s.p MVR / CABG   --- HTN (hypertension).   ---HLD (hyperlipidemia).   --- Non-ST elevation MI (NSTEMI).   ---Post op Hypovolemia  ---Post op respiratory insufficiency       Plan:  -DC  priamcor   -pain control prn   -diuresis when able.  -start beta blocker   -Continue statin-    ====================== NEUROLOGY=====================  diphenhydrAMINE 25 milliGRAM(s) Oral every 4 hours PRN Rash and/or Itching  oxyCODONE    IR 5 milliGRAM(s) Oral every 6 hours PRN Moderate Pain (4 - 6)  sertraline 100 milliGRAM(s) Oral daily    ==================== RESPIRATORY======================  Post op respiratory insufficiency      ====================CARDIOVASCULAR==================  Post op Hypovolemia  furosemide   Injectable 40 milliGRAM(s) IV Push two times a day  metoprolol tartrate 25 milliGRAM(s) Oral two times a day    ===================HEMATOLOGIC/ONC ===================  Monitor H&H/Plts    aspirin enteric coated 81 milliGRAM(s) Oral daily  heparin   Injectable 5000 Unit(s) SubCutaneous every 12 hours    ===================== RENAL =========================  Continue monitoring urine output, I&OS, BUN/Cr     ==================== GASTROINTESTINAL===================  ascorbic acid 500 milliGRAM(s) Oral daily  dextrose 10% Bolus 250 milliLiter(s) IV Bolus once  dextrose 5%. 1000 milliLiter(s) (50 mL/Hr) IV Continuous <Continuous>  ferrous    sulfate 325 milliGRAM(s) Oral daily  folic acid 1 milliGRAM(s) Oral daily  pantoprazole    Tablet 40 milliGRAM(s) Oral before breakfast  polyethylene glycol 3350 17 Gram(s) Oral daily  senna 2 Tablet(s) Oral at bedtime  sodium chloride 0.9%. 1000 milliLiter(s) (10 mL/Hr) IV Continuous <Continuous>  sodium chloride 0.9%. 1000 milliLiter(s) (5 mL/Hr) IV Continuous <Continuous>    =======================    ENDOCRINE  =====================  atorvastatin 80 milliGRAM(s) Oral at bedtime  dextrose Oral Gel 15 Gram(s) Oral once PRN Blood Glucose LESS THAN 70 milliGRAM(s)/deciliter  insulin lispro (ADMELOG) corrective regimen sliding scale   SubCutaneous Before meals and at bedtime  levothyroxine 112 MICROGram(s) Oral daily    ========================INFECTIOUS DISEASE================      -Close hemodynamic , ventilatory and drain monitoring and management per post op routine .  -Monitor Neurologic status ,   -Head of the bed should remain elevated to 45 degrees,  -Monitor adequacy of oxygenation and ventilation and attempt to wean oxygen ,  -Monitor for arrhythmias and monitor parameters for organ perfusion,  -Glycemic control is satisfactory,  -Nutritional goals will be met using po eventually , insure adequate caloric intake and monitor the same ,  -Electrolytes have been repleted as necessary , pain control has been achieved  and wound care has been carried out ,  -Stress ulcer and VTE prophylaxis will be achieved,  -Agressive PT and early mobility and ambulation goals will be met,  -The family was updated about the course and plan .      I have spent 35 minutes providing acute care for this critically ill patient     Patient requires continuous monitoring with bedside rhythm monitoring, pulse ox monitoring, and intermittent blood gas analysis. Care plan discussed with ICU care team. Patient remained critical and at risk for life threatening decompensation.            Attending to bill

## 2024-11-02 NOTE — PHYSICAL THERAPY INITIAL EVALUATION ADULT - CRITERIA FOR SKILLED THERAPEUTIC INTERVENTIONS
No impairments found/functional limitations in following categories/risk reduction/prevention/rehab potential/predicted duration of therapy intervention/anticipated equipment needs at discharge/anticipated discharge recommendation
